# Patient Record
Sex: MALE | Race: WHITE | NOT HISPANIC OR LATINO | Employment: FULL TIME | URBAN - METROPOLITAN AREA
[De-identification: names, ages, dates, MRNs, and addresses within clinical notes are randomized per-mention and may not be internally consistent; named-entity substitution may affect disease eponyms.]

---

## 2017-11-01 ENCOUNTER — HOSPITAL ENCOUNTER (EMERGENCY)
Facility: HOSPITAL | Age: 27
Discharge: LEFT BEFORE DISCHARGE | End: 2017-11-01

## 2017-11-01 PROCEDURE — 99211 OFF/OP EST MAY X REQ PHY/QHP: CPT

## 2018-08-15 ENCOUNTER — OFFICE VISIT (OUTPATIENT)
Dept: INTERNAL MEDICINE | Facility: CLINIC | Age: 28
End: 2018-08-15

## 2018-08-15 VITALS
SYSTOLIC BLOOD PRESSURE: 122 MMHG | HEART RATE: 74 BPM | RESPIRATION RATE: 16 BRPM | DIASTOLIC BLOOD PRESSURE: 84 MMHG | BODY MASS INDEX: 23.84 KG/M2 | HEIGHT: 72 IN | TEMPERATURE: 97 F | WEIGHT: 176 LBS

## 2018-08-15 DIAGNOSIS — F41.0 PANIC ATTACKS: ICD-10-CM

## 2018-08-15 DIAGNOSIS — Z76.89 ENCOUNTER TO ESTABLISH CARE: Primary | ICD-10-CM

## 2018-08-15 DIAGNOSIS — F41.9 ANXIETY: ICD-10-CM

## 2018-08-15 DIAGNOSIS — Z13.220 LIPID SCREENING: ICD-10-CM

## 2018-08-15 LAB
ALBUMIN SERPL-MCNC: 4.41 G/DL (ref 3.2–4.8)
ALBUMIN/GLOB SERPL: 1.9 G/DL (ref 1.5–2.5)
ALP SERPL-CCNC: 59 U/L (ref 25–100)
ALT SERPL W P-5'-P-CCNC: 20 U/L (ref 7–40)
ANION GAP SERPL CALCULATED.3IONS-SCNC: 5 MMOL/L (ref 3–11)
ARTICHOKE IGE QN: 83 MG/DL (ref 0–130)
AST SERPL-CCNC: 19 U/L (ref 0–33)
BILIRUB SERPL-MCNC: 0.4 MG/DL (ref 0.3–1.2)
BUN BLD-MCNC: 11 MG/DL (ref 9–23)
BUN/CREAT SERPL: 12.1 (ref 7–25)
CALCIUM SPEC-SCNC: 9.1 MG/DL (ref 8.7–10.4)
CHLORIDE SERPL-SCNC: 104 MMOL/L (ref 99–109)
CHOLEST SERPL-MCNC: 159 MG/DL (ref 0–200)
CO2 SERPL-SCNC: 30 MMOL/L (ref 20–31)
CREAT BLD-MCNC: 0.91 MG/DL (ref 0.6–1.3)
DEPRECATED RDW RBC AUTO: 44.3 FL (ref 37–54)
ERYTHROCYTE [DISTWIDTH] IN BLOOD BY AUTOMATED COUNT: 13.5 % (ref 11.3–14.5)
GFR SERPL CREATININE-BSD FRML MDRD: 101 ML/MIN/1.73
GLOBULIN UR ELPH-MCNC: 2.3 GM/DL
GLUCOSE BLD-MCNC: 92 MG/DL (ref 70–100)
HCT VFR BLD AUTO: 42.4 % (ref 38.9–50.9)
HDLC SERPL-MCNC: 60 MG/DL (ref 40–60)
HGB BLD-MCNC: 14 G/DL (ref 13.1–17.5)
MCH RBC QN AUTO: 29.9 PG (ref 27–31)
MCHC RBC AUTO-ENTMCNC: 33 G/DL (ref 32–36)
MCV RBC AUTO: 90.6 FL (ref 80–99)
PLATELET # BLD AUTO: 239 10*3/MM3 (ref 150–450)
PMV BLD AUTO: 10.6 FL (ref 6–12)
POTASSIUM BLD-SCNC: 4.7 MMOL/L (ref 3.5–5.5)
PROT SERPL-MCNC: 6.7 G/DL (ref 5.7–8.2)
RBC # BLD AUTO: 4.68 10*6/MM3 (ref 4.2–5.76)
SODIUM BLD-SCNC: 139 MMOL/L (ref 132–146)
T4 FREE SERPL-MCNC: 1.23 NG/DL (ref 0.89–1.76)
TRIGL SERPL-MCNC: 105 MG/DL (ref 0–150)
TSH SERPL DL<=0.05 MIU/L-ACNC: 0.45 MIU/ML (ref 0.35–5.35)
WBC NRBC COR # BLD: 7.24 10*3/MM3 (ref 3.5–10.8)

## 2018-08-15 PROCEDURE — 36415 COLL VENOUS BLD VENIPUNCTURE: CPT | Performed by: INTERNAL MEDICINE

## 2018-08-15 PROCEDURE — 84443 ASSAY THYROID STIM HORMONE: CPT | Performed by: INTERNAL MEDICINE

## 2018-08-15 PROCEDURE — 99204 OFFICE O/P NEW MOD 45 MIN: CPT | Performed by: INTERNAL MEDICINE

## 2018-08-15 PROCEDURE — 85027 COMPLETE CBC AUTOMATED: CPT | Performed by: INTERNAL MEDICINE

## 2018-08-15 PROCEDURE — 80061 LIPID PANEL: CPT | Performed by: INTERNAL MEDICINE

## 2018-08-15 PROCEDURE — 84439 ASSAY OF FREE THYROXINE: CPT | Performed by: INTERNAL MEDICINE

## 2018-08-15 PROCEDURE — 80053 COMPREHEN METABOLIC PANEL: CPT | Performed by: INTERNAL MEDICINE

## 2018-08-15 RX ORDER — CETIRIZINE HYDROCHLORIDE 10 MG/1
10 TABLET ORAL AS NEEDED
COMMUNITY

## 2018-08-15 RX ORDER — DIPHENHYDRAMINE HCL 25 MG
25 CAPSULE ORAL NIGHTLY PRN
COMMUNITY
End: 2019-06-10

## 2018-08-15 RX ORDER — HYDROXYZINE 50 MG/1
50 TABLET, FILM COATED ORAL 3 TIMES DAILY PRN
Qty: 50 TABLET | Refills: 2 | Status: SHIPPED | OUTPATIENT
Start: 2018-08-15 | End: 2018-11-29 | Stop reason: SDUPTHER

## 2018-08-15 NOTE — PROGRESS NOTES
Subjective   Tr Brito is a 28 y.o. male.     History of Present Illness     New patient - establish visit    1 anxiety/panic  Duration 3-4 years  Patient says that he has been having increase episodes of panic attacks .  Patient says that he has at least 3-4 panic attacks a month and does not have any particular etiology or cause.  Patient denies any worsening depression, Hallucinations, or any other systemic symptoms.      Past medical History   Colitis- 2015    Family History   Anxiety  Depression     Socially History no EtOH consumption, no IV drug use, no marijuana,    Review of Systems   All other systems reviewed and are negative.      Objective   Physical Exam   Constitutional: He is oriented to person, place, and time. He appears well-developed and well-nourished.   HENT:   Head: Normocephalic.   Right Ear: External ear normal.   Left Ear: External ear normal.   Nose: Nose normal.   Mouth/Throat: Oropharynx is clear and moist.   Eyes: Pupils are equal, round, and reactive to light. Conjunctivae and EOM are normal.   Neck: Normal range of motion. Neck supple.   Cardiovascular: Normal rate, regular rhythm and normal heart sounds.    Pulmonary/Chest: Effort normal and breath sounds normal.   Musculoskeletal: Normal range of motion.   Neurological: He is alert and oriented to person, place, and time.   Nursing note and vitals reviewed.        Assessment/Plan   Tr was seen today for establish care and anxiety.    Diagnoses and all orders for this visit:    Encounter to establish care  -     CBC (No Diff)  -     Comprehensive Metabolic Panel  -     T4, Free  -     TSH    Anxiety  -     hydrOXYzine (ATARAX) 50 MG tablet; Take 1 tablet by mouth 3 (Three) Times a Day As Needed for Itching.    Panic attacks    Lipid screening  -     Lipid Panel

## 2018-09-13 ENCOUNTER — TELEPHONE (OUTPATIENT)
Dept: INTERNAL MEDICINE | Facility: CLINIC | Age: 28
End: 2018-09-13

## 2018-09-13 NOTE — TELEPHONE ENCOUNTER
In regards to addressing his current back pain situation he would have to be seen and evaluated before any recommendations and treatment.    Insurance will likely want him to do physical therapy first before considering covering for an MRI just FYI    Patient really should be scheduled for a follow-up appointment for his current back situation

## 2018-09-13 NOTE — TELEPHONE ENCOUNTER
----- Message from Jenny Sarah sent at 9/13/2018  1:42 PM EDT -----  MOTHER CRESENCIO LAOVEDTFA-[phone]    PT HAD AN MRI ABOUT 7 YEARS AGO THAT SHOWED A HERNIATED DISC.  HE IS IN EXTREME PAIN AND HAS HAD NUMBNESS AND PAIN IN HIS FEET.  HE THINKS HE HAD THE MRI AT [facility]-CAN YOU FIND THAT AND THEN WHAT IS THE NEXT STEP?  THEY ARE WONDERING IF HE NEEDS ANOTHER MRI TO COMPARE.

## 2018-09-20 ENCOUNTER — OFFICE VISIT (OUTPATIENT)
Dept: INTERNAL MEDICINE | Facility: CLINIC | Age: 28
End: 2018-09-20

## 2018-09-20 VITALS
RESPIRATION RATE: 18 BRPM | HEART RATE: 68 BPM | DIASTOLIC BLOOD PRESSURE: 86 MMHG | SYSTOLIC BLOOD PRESSURE: 128 MMHG | WEIGHT: 183 LBS | BODY MASS INDEX: 24.65 KG/M2 | TEMPERATURE: 97.4 F

## 2018-09-20 DIAGNOSIS — M54.41 CHRONIC BILATERAL LOW BACK PAIN WITH RIGHT-SIDED SCIATICA: ICD-10-CM

## 2018-09-20 DIAGNOSIS — G89.29 CHRONIC BILATERAL LOW BACK PAIN WITHOUT SCIATICA: ICD-10-CM

## 2018-09-20 DIAGNOSIS — F41.9 ANXIETY: Primary | ICD-10-CM

## 2018-09-20 DIAGNOSIS — M54.50 CHRONIC BILATERAL LOW BACK PAIN WITHOUT SCIATICA: ICD-10-CM

## 2018-09-20 DIAGNOSIS — G89.29 CHRONIC BILATERAL LOW BACK PAIN WITH RIGHT-SIDED SCIATICA: ICD-10-CM

## 2018-09-20 PROCEDURE — 99214 OFFICE O/P EST MOD 30 MIN: CPT | Performed by: INTERNAL MEDICINE

## 2018-09-20 RX ORDER — MELOXICAM 15 MG/1
15 TABLET ORAL DAILY
Qty: 30 TABLET | Refills: 3 | Status: SHIPPED | OUTPATIENT
Start: 2018-09-20 | End: 2019-06-10

## 2018-09-20 NOTE — PROGRESS NOTES
Subjective   Tr Brito is a 28 y.o. male.     History of Present Illness     1 Back Pain -chronic.  Patient has had chronic back pain for several years.  Pain is localized to lower lumbar region and made worse with flexion, extension, rotational movement.  He also has pain in lower lumbar region with prolong standing.  Patient has been using      2 anxiety- chronic and controlled.  Patient says that his emotions have been doing fairly well.  Patient has not had any recent exacerbations of her panic attacks.    Past medical History   Herniated disc on L5 -dx about 5 years ago     Review of Systems   All other systems reviewed and are negative.      Objective   Physical Exam   Constitutional: He is oriented to person, place, and time. He appears well-developed and well-nourished.   HENT:   Head: Normocephalic.   Right Ear: External ear normal.   Left Ear: External ear normal.   Nose: Nose normal.   Mouth/Throat: Oropharynx is clear and moist.   Eyes: Pupils are equal, round, and reactive to light. Conjunctivae and EOM are normal.   Neck: Normal range of motion. Neck supple.   Cardiovascular: Normal rate, regular rhythm and normal heart sounds.    Pulmonary/Chest: Effort normal and breath sounds normal.   Abdominal: Soft. Bowel sounds are normal.   Musculoskeletal: Normal range of motion.   Neurological: He is alert and oriented to person, place, and time.   Nursing note and vitals reviewed.        Assessment/Plan   Tr was seen today for back pain.    Diagnoses and all orders for this visit:    Anxiety continue on current medications.    Chronic bilateral low back pain without sciatica  -     meloxicam (MOBIC) 15 MG tablet; Take 1 tablet by mouth Daily.    Chronic bilateral low back pain with right-sided sciatica  -     MRI Lumbar Spine Without Contrast; Future

## 2018-09-26 ENCOUNTER — TELEPHONE (OUTPATIENT)
Dept: INTERNAL MEDICINE | Facility: CLINIC | Age: 28
End: 2018-09-26

## 2018-11-14 ENCOUNTER — OFFICE VISIT (OUTPATIENT)
Dept: INTERNAL MEDICINE | Facility: CLINIC | Age: 28
End: 2018-11-14

## 2018-11-14 VITALS
SYSTOLIC BLOOD PRESSURE: 122 MMHG | BODY MASS INDEX: 25.99 KG/M2 | OXYGEN SATURATION: 99 % | HEART RATE: 63 BPM | RESPIRATION RATE: 16 BRPM | DIASTOLIC BLOOD PRESSURE: 78 MMHG | WEIGHT: 193 LBS

## 2018-11-14 DIAGNOSIS — K62.5 BRBPR (BRIGHT RED BLOOD PER RECTUM): Primary | ICD-10-CM

## 2018-11-14 PROCEDURE — 99214 OFFICE O/P EST MOD 30 MIN: CPT | Performed by: INTERNAL MEDICINE

## 2018-11-14 NOTE — PROGRESS NOTES
"Subjective   Tr Brito is a 28 y.o. male.     History of Present Illness     Blood in the stool -bright red blood (new issue)  Duration several months  Sx Patient says that he has noticed some bright red blood patient describes the sensation of itching, anal irritation, \"it feels like there is a cut right there\" he denies any fever, chills, nausea, vomiting, diarrhea, anorexia, weight loss, or any other systemic signs.      Family History   No inflammatory bowel disease       Past medical history   Colitis-secondary to salmonella   Previous colonoscopy before-but patient says that he does not feel like these are the similar symptoms of when he had the infectious colitis      Review of Systems   All other systems reviewed and are negative.      Objective   Physical Exam   Constitutional: He appears well-developed and well-nourished.   HENT:   Head: Normocephalic and atraumatic.   Nose: Nose normal.   Mouth/Throat: Oropharynx is clear and moist.   Eyes: EOM are normal. Pupils are equal, round, and reactive to light.   Neck: Normal range of motion. Neck supple.   Cardiovascular: Normal rate, regular rhythm and normal heart sounds.   Pulmonary/Chest: Effort normal and breath sounds normal.   Abdominal: Soft. Bowel sounds are normal. He exhibits no distension and no mass. There is no tenderness. There is no rebound and no guarding. No hernia.   Genitourinary: Rectal exam shows guaiac positive stool.   Neurological: He is alert.   Psychiatric: He has a normal mood and affect. His behavior is normal.   Nursing note and vitals reviewed.        Assessment/Plan   Tr was seen today for rectal bleeding.    Diagnoses and all orders for this visit:    BRBPR (bright red blood per rectum)    After review of history, physical, symptoms are more suggestive of a possible internal hemorrhoid or anal fissure.    Recommend sitz bath.  Recommend Preparation H steroid suppository  If symptoms do not improve in 2 weeks " patient is to call back to the clinic and to inform me and we will proceed with further workup and recommendations.-Most likely referral to GI and/or endoscopy

## 2018-11-29 ENCOUNTER — OFFICE VISIT (OUTPATIENT)
Dept: INTERNAL MEDICINE | Facility: CLINIC | Age: 28
End: 2018-11-29

## 2018-11-29 VITALS
OXYGEN SATURATION: 98 % | SYSTOLIC BLOOD PRESSURE: 118 MMHG | BODY MASS INDEX: 25.73 KG/M2 | RESPIRATION RATE: 16 BRPM | HEART RATE: 68 BPM | DIASTOLIC BLOOD PRESSURE: 78 MMHG | WEIGHT: 191 LBS

## 2018-11-29 DIAGNOSIS — F41.9 ANXIETY: Primary | ICD-10-CM

## 2018-11-29 PROCEDURE — 99213 OFFICE O/P EST LOW 20 MIN: CPT | Performed by: INTERNAL MEDICINE

## 2018-11-29 RX ORDER — HYDROXYZINE 50 MG/1
50 TABLET, FILM COATED ORAL 3 TIMES DAILY PRN
Qty: 50 TABLET | Refills: 2 | Status: SHIPPED | OUTPATIENT
Start: 2018-11-29 | End: 2018-11-29 | Stop reason: SDUPTHER

## 2018-11-29 RX ORDER — BUPROPION HYDROCHLORIDE 150 MG/1
150 TABLET ORAL DAILY
Qty: 30 TABLET | Refills: 3 | Status: SHIPPED | OUTPATIENT
Start: 2018-11-29 | End: 2019-01-11

## 2018-11-29 RX ORDER — BUPROPION HYDROCHLORIDE 150 MG/1
150 TABLET ORAL DAILY
Qty: 30 TABLET | Refills: 3 | Status: SHIPPED | OUTPATIENT
Start: 2018-11-29 | End: 2018-11-29 | Stop reason: SDUPTHER

## 2018-11-29 RX ORDER — HYDROXYZINE 50 MG/1
50 TABLET, FILM COATED ORAL 3 TIMES DAILY PRN
Qty: 50 TABLET | Refills: 2 | Status: SHIPPED | OUTPATIENT
Start: 2018-11-29 | End: 2019-02-28 | Stop reason: SDUPTHER

## 2018-11-29 NOTE — PROGRESS NOTES
Subjective   Tr Brito is a 28 y.o. male.     History of Present Illness     1 hemorrhoids-currently resolved, patient says that he has been using the rectal suppository and this has helped tremendously with his symptoms.    2 anxiety-patient also says that the Wellbutrin has helped tremendously with controlling his anxiety, emotions, and no concerns of depression at this time.  Patient denies any suicide ideations, emotional liability of threats towards himself or anybody else    Past Medical history  Anxiety/depression - 2014    Review of Systems   All other systems reviewed and are negative.      Objective   Physical Exam   Constitutional: He is oriented to person, place, and time. He appears well-developed and well-nourished.   HENT:   Head: Normocephalic.   Right Ear: External ear normal.   Left Ear: External ear normal.   Nose: Nose normal.   Mouth/Throat: Oropharynx is clear and moist.   Eyes: Conjunctivae and EOM are normal. Pupils are equal, round, and reactive to light.   Neck: Normal range of motion. Neck supple.   Cardiovascular: Normal rate, regular rhythm and normal heart sounds.   Pulmonary/Chest: Effort normal and breath sounds normal.   Musculoskeletal: Normal range of motion.   Neurological: He is alert and oriented to person, place, and time.   Nursing note and vitals reviewed.        Assessment/Plan   Tr was seen today for follow-up.    Diagnoses and all orders for this visit:    Anxiety  -     Discontinue: buPROPion XL (WELLBUTRIN XL) 150 MG 24 hr tablet; Take 1 tablet by mouth Daily.    -     Discontinue: hydrOXYzine (ATARAX) 50 MG tablet; Take 1 tablet by mouth 3 (Three) Times a Day As   Needed for Itching.    -     hydrOXYzine (ATARAX) 50 MG tablet; Take 1 tablet by mouth 3 (Three) Times a Day As Needed for Itching.    -     buPROPion XL (WELLBUTRIN XL) 150 MG 24 hr tablet; Take 1 tablet by mouth Daily.

## 2019-01-11 ENCOUNTER — OFFICE VISIT (OUTPATIENT)
Dept: INTERNAL MEDICINE | Facility: CLINIC | Age: 29
End: 2019-01-11

## 2019-01-11 VITALS
SYSTOLIC BLOOD PRESSURE: 120 MMHG | RESPIRATION RATE: 18 BRPM | DIASTOLIC BLOOD PRESSURE: 76 MMHG | OXYGEN SATURATION: 99 % | BODY MASS INDEX: 26.61 KG/M2 | WEIGHT: 197.6 LBS | HEART RATE: 66 BPM

## 2019-01-11 DIAGNOSIS — F41.0 PANIC ATTACKS: ICD-10-CM

## 2019-01-11 DIAGNOSIS — F41.9 ANXIETY: Primary | ICD-10-CM

## 2019-01-11 PROCEDURE — 99214 OFFICE O/P EST MOD 30 MIN: CPT | Performed by: INTERNAL MEDICINE

## 2019-01-11 RX ORDER — BUPROPION HYDROCHLORIDE 300 MG/1
300 TABLET ORAL DAILY
Qty: 30 TABLET | Refills: 3 | Status: SHIPPED | OUTPATIENT
Start: 2019-01-11 | End: 2020-02-07

## 2019-01-11 NOTE — PROGRESS NOTES
Subjective   Tr Brito is a 28 y.o. male.     History of Present Illness     1anxiety/panic attack-patient is here for follow-up with concerns of his anxiety and panic attacks.  Patient says that he does not feel any better or worse since starting the medication that he has had some intermittent episodes of panic attacks.  Patient denies any worsening depression, suicide ideations, hallucinations, or any other systemic disease.  Patient is open to considerations of increasing the medication to see how this helps with his anxiety.     Review of Systems   All other systems reviewed and are negative.      Objective   Physical Exam   Constitutional: He appears well-developed and well-nourished.   HENT:   Head: Normocephalic.   Right Ear: External ear normal.   Left Ear: External ear normal.   Nose: Nose normal.   Mouth/Throat: Oropharynx is clear and moist.   Eyes: Conjunctivae and EOM are normal. Pupils are equal, round, and reactive to light.   Neck: Normal range of motion. Neck supple.   Cardiovascular: Normal rate, regular rhythm and normal heart sounds.   Pulmonary/Chest: Effort normal and breath sounds normal.   Abdominal: Soft. Bowel sounds are normal.   Nursing note and vitals reviewed.        Assessment/Plan   Tr was seen today for anxiety and panic attack.    Diagnoses and all orders for this visit:    Anxiety  -     Ambulatory Referral to Psychiatry  (medication increase dosage change)  -     buPROPion XL (WELLBUTRIN XL) 300 MG 24 hr tablet; Take 1 tablet by mouth Daily.    Panic attacks  -     Ambulatory Referral to Psychiatry

## 2019-02-28 ENCOUNTER — OFFICE VISIT (OUTPATIENT)
Dept: INTERNAL MEDICINE | Facility: CLINIC | Age: 29
End: 2019-02-28

## 2019-02-28 VITALS
HEART RATE: 78 BPM | SYSTOLIC BLOOD PRESSURE: 110 MMHG | TEMPERATURE: 98 F | WEIGHT: 203.25 LBS | RESPIRATION RATE: 20 BRPM | BODY MASS INDEX: 27.38 KG/M2 | DIASTOLIC BLOOD PRESSURE: 80 MMHG

## 2019-02-28 DIAGNOSIS — F41.9 ANXIETY: Primary | ICD-10-CM

## 2019-02-28 DIAGNOSIS — M54.50 CHRONIC BILATERAL LOW BACK PAIN WITHOUT SCIATICA: ICD-10-CM

## 2019-02-28 DIAGNOSIS — G89.29 CHRONIC BILATERAL LOW BACK PAIN WITHOUT SCIATICA: ICD-10-CM

## 2019-02-28 DIAGNOSIS — F32.A DEPRESSION, UNSPECIFIED DEPRESSION TYPE: ICD-10-CM

## 2019-02-28 PROCEDURE — 99214 OFFICE O/P EST MOD 30 MIN: CPT | Performed by: INTERNAL MEDICINE

## 2019-02-28 RX ORDER — HYDROXYZINE 50 MG/1
50 TABLET, FILM COATED ORAL 3 TIMES DAILY PRN
Qty: 50 TABLET | Refills: 2 | Status: SHIPPED | OUTPATIENT
Start: 2019-02-28 | End: 2021-01-25 | Stop reason: SDUPTHER

## 2019-02-28 RX ORDER — TRAMADOL HYDROCHLORIDE 50 MG/1
50 TABLET ORAL EVERY 6 HOURS PRN
Qty: 50 TABLET | Refills: 2 | Status: SHIPPED | OUTPATIENT
Start: 2019-02-28 | End: 2019-03-25 | Stop reason: SDUPTHER

## 2019-03-06 ENCOUNTER — TELEPHONE (OUTPATIENT)
Dept: INTERNAL MEDICINE | Facility: CLINIC | Age: 29
End: 2019-03-06

## 2019-03-06 NOTE — TELEPHONE ENCOUNTER
Unfortunately that is the strongest that I can give.  Anything stronger will have to be referred to a chronic pain management i.e. for chronic pain

## 2019-03-06 NOTE — TELEPHONE ENCOUNTER
----- Message from Davida Clement sent at 3/5/2019  2:59 PM EST -----  PATIENT'S MOM CRESENCIO CALLED AND STATES THE ULTRAM ISN'T HELPING WITH THE BACK PAIN CAN SOMETHING STRONGER [facility] [address] SHE STATES SHE WOULD LIKE AN UPDATE ON THE MRI PA. SHE CAN BE REACHED [phone]

## 2019-03-11 ENCOUNTER — TELEPHONE (OUTPATIENT)
Dept: INTERNAL MEDICINE | Facility: CLINIC | Age: 29
End: 2019-03-11

## 2019-03-11 NOTE — TELEPHONE ENCOUNTER
Bryant Pt  LOV:02/28/19  Next appt: 04/10/19  Last refill:02/28/19    Pt should've had 2 refills on Rx that was printed. I called pharmacy and she stated it was scribbled out on the bottom of the Rx. I indicated just to go ahead and give pt new Rx with 1 additional at this time. She verbalized good understanding, thanked our office and we ended the call.

## 2019-03-11 NOTE — TELEPHONE ENCOUNTER
----- Message from Mildred Guillen sent at 3/11/2019 11:53 AM EDT -----  PATIENT CALLED AND REQUESTED A REFILL ON traMADol (ULTRAM) 50 MG tablet. PATIENTS PREFERRED PHARMACY IS [facility] [place]. PATIENT CAN BE REACHED [phone].

## 2019-03-19 ENCOUNTER — HOSPITAL ENCOUNTER (OUTPATIENT)
Dept: MRI IMAGING | Facility: HOSPITAL | Age: 29
Discharge: HOME | End: 2019-03-19
Admitting: INTERNAL MEDICINE

## 2019-03-19 DIAGNOSIS — G89.29 CHRONIC BILATERAL LOW BACK PAIN WITHOUT SCIATICA: ICD-10-CM

## 2019-03-19 DIAGNOSIS — M54.50 CHRONIC BILATERAL LOW BACK PAIN WITHOUT SCIATICA: ICD-10-CM

## 2019-03-19 PROCEDURE — 72148 MRI LUMBAR SPINE W/O DYE: CPT

## 2019-03-22 ENCOUNTER — TELEPHONE (OUTPATIENT)
Dept: INTERNAL MEDICINE | Facility: CLINIC | Age: 29
End: 2019-03-22

## 2019-03-22 DIAGNOSIS — M54.50 CHRONIC BILATERAL LOW BACK PAIN WITHOUT SCIATICA: ICD-10-CM

## 2019-03-22 DIAGNOSIS — G89.29 CHRONIC BILATERAL LOW BACK PAIN WITHOUT SCIATICA: ICD-10-CM

## 2019-03-22 NOTE — TELEPHONE ENCOUNTER
Spoke to patient he stated that he would like to see about maybe back injections first before surgery or the pain clinic. He also stated that he has an old MRI in chart and wanted to know if you could compare them to see if his changes have gotten worse or stayed the same.

## 2019-03-22 NOTE — TELEPHONE ENCOUNTER
The only other option would be to increase the dosage to 2 tablets of the tramadol.    Let me know what he would like to do

## 2019-03-22 NOTE — TELEPHONE ENCOUNTER
Patient called back and states he is currently taking Tramadol and not getting any relief at the current dosage. Understands he will not be prescribed any different pain med but wants to know if he can possibly be given a higher dose. Uses [facility] in [place].

## 2019-03-25 RX ORDER — TRAMADOL HYDROCHLORIDE 50 MG/1
TABLET ORAL
Qty: 120 TABLET | Refills: 1 | Status: SHIPPED | OUTPATIENT
Start: 2019-03-25 | End: 2019-06-10 | Stop reason: SDUPTHER

## 2019-03-25 NOTE — TELEPHONE ENCOUNTER
Please tell patient that that is correct a disc protrusion is the same as disc herniation or bulge.  This implies that the vertebral discs has slipped out of place and is now impinging or compressing a nerve in the lower back region.  This compression with therefore be the origin of his pain.    I have called in the tramadol 50 mg    He is to take 2 tablets by mouth every 6 hours as needed

## 2019-03-25 NOTE — TELEPHONE ENCOUNTER
Spoke to patient he is agreeable to the increase of the tramadol. He also wanted to know what it means when you say disc protrusion. He stated that he has tried to look it up on the Internet and some sites tell him its herniated and some say bulge. Please advise.

## 2019-03-28 ENCOUNTER — TELEPHONE (OUTPATIENT)
Dept: INTERNAL MEDICINE | Facility: CLINIC | Age: 29
End: 2019-03-28

## 2019-03-28 DIAGNOSIS — M54.5 CHRONIC LOW BACK PAIN, UNSPECIFIED BACK PAIN LATERALITY, WITH SCIATICA PRESENCE UNSPECIFIED: Primary | ICD-10-CM

## 2019-03-28 DIAGNOSIS — G89.29 CHRONIC LOW BACK PAIN, UNSPECIFIED BACK PAIN LATERALITY, WITH SCIATICA PRESENCE UNSPECIFIED: Primary | ICD-10-CM

## 2019-03-28 NOTE — TELEPHONE ENCOUNTER
----- Message from Lisa Corrales sent at 3/28/2019  3:47 PM EDT -----  Patient called states there was a misunderstanding and he did in fact want a referral to pain management. He does not want surgical options at this time but he does want to receive back injections at a pain clinic. Please put in referral to pain management. Pain management consent form was mailed to home address of [address] [place], [place] [id number].

## 2019-03-29 ENCOUNTER — PRIOR AUTHORIZATION (OUTPATIENT)
Dept: INTERNAL MEDICINE | Facility: CLINIC | Age: 29
End: 2019-03-29

## 2019-03-29 NOTE — TELEPHONE ENCOUNTER
PA was sent to covermymeds and was denied, Patient paid out of pocket for Rx. He stated that he will call the insurance company and see what is the preferred drug.

## 2019-03-29 NOTE — TELEPHONE ENCOUNTER
Patient called back and stated that he called his insurance and asked what drug is covered under their plan and the pharmacist told him that they could cover Hydrocodone for 7 days anything past the 7 days would need a PA.

## 2019-03-30 NOTE — TELEPHONE ENCOUNTER
I am not going to let insurance dictate the proper treatment for pain management for this patient.  The insurance companies no absolutely nothing about the history of this patient and therefore hydrocodone would not be appropriate for him.    The strongest that I will provide his tramadol    If you do not mind Dee if you could please provide me with the contact number for the insurance company, the insurance company pharmacy and prior authorization department.  Just leave the contact information on my desk and I will contact them myself this type of request from the insurance company is inappropriate and I would like to talk to them myself when I get back.    Please let patient know that tramadol is the strongest that I can provide–no narcotics

## 2019-05-08 ENCOUNTER — TELEPHONE (OUTPATIENT)
Dept: PAIN MEDICINE | Facility: CLINIC | Age: 29
End: 2019-05-08

## 2019-05-22 ENCOUNTER — TELEPHONE (OUTPATIENT)
Dept: INTERNAL MEDICINE | Facility: CLINIC | Age: 29
End: 2019-05-22

## 2019-05-22 NOTE — TELEPHONE ENCOUNTER
Hydroxyzine can be increased to 75-100mg TID PRN. (Would not do longterm as can cause dizziness, dry mouth etc) but short term increase for flight anxiety may help.    Unfortunately anything further would need appt and/or d/w PCP.

## 2019-05-22 NOTE — TELEPHONE ENCOUNTER
Patient called in today and stated that he is leaving out of town on a flight tonight at 8 and started to feel the panic attacks last night but thought he would be ok, but today woke up with extreme anxiety about the travel. I explained that Dr. Hurtado is out of the office today but I could send a message to the on call doctor. Patient agreed and was just wanting to know if there is anything he could do for his symptoms before he leaves tonight. Patient is currently on Hydroxyzine 50mg TID as needed and Wellbutrin 300 mg daily.

## 2019-06-10 ENCOUNTER — OFFICE VISIT (OUTPATIENT)
Dept: INTERNAL MEDICINE | Facility: CLINIC | Age: 29
End: 2019-06-10

## 2019-06-10 VITALS
BODY MASS INDEX: 26.43 KG/M2 | HEART RATE: 80 BPM | DIASTOLIC BLOOD PRESSURE: 80 MMHG | WEIGHT: 196.25 LBS | RESPIRATION RATE: 20 BRPM | TEMPERATURE: 98.1 F | SYSTOLIC BLOOD PRESSURE: 120 MMHG

## 2019-06-10 DIAGNOSIS — F43.21 GRIEF: Primary | ICD-10-CM

## 2019-06-10 DIAGNOSIS — F41.9 ANXIETY: ICD-10-CM

## 2019-06-10 DIAGNOSIS — G89.29 CHRONIC BILATERAL LOW BACK PAIN WITHOUT SCIATICA: ICD-10-CM

## 2019-06-10 DIAGNOSIS — F41.0 PANIC ATTACKS: ICD-10-CM

## 2019-06-10 DIAGNOSIS — M54.50 CHRONIC BILATERAL LOW BACK PAIN WITHOUT SCIATICA: ICD-10-CM

## 2019-06-10 PROCEDURE — 99214 OFFICE O/P EST MOD 30 MIN: CPT | Performed by: INTERNAL MEDICINE

## 2019-06-10 RX ORDER — TRAMADOL HYDROCHLORIDE 50 MG/1
TABLET ORAL
Qty: 120 TABLET | Refills: 1 | Status: SHIPPED | OUTPATIENT
Start: 2019-06-10 | End: 2022-09-20

## 2019-06-17 NOTE — PROGRESS NOTES
"Subjective   Tr Brito is a 29 y.o. male.     History of Present Illness     The following portions of the patient's history were reviewed and updated as appropriate: allergies, current medications, past family history, past medical history, past social history, past surgical history and problem list.    Anxiety-chronic and worsening with minimal exacerbations.  Patient is still dealing with the death of his mother and it has been \"difficult\" but he has been managing with support from family and friends.  He denies any suicidal ideations, emotional liability or threats towards himself or anybody else, no hallucinations.  He is continued on his current medication and has not had any side effects from these meds.    2 low back pain-chronic with mild exacerbation.  Patient continues to have intermittent exacerbations of low back pain localized to lower lumbar region pain is made worse with prolonged standing, rotational movement around the lumbar region bending and twisting.  Patient denies any lower extremity weakness, numbness, tingling, or any other systemic symptoms.    Review of Systems   All other systems reviewed and are negative.      Objective   Physical Exam   Constitutional: He appears well-developed and well-nourished.   HENT:   Head: Normocephalic.   Right Ear: External ear normal.   Left Ear: External ear normal.   Nose: Nose normal.   Mouth/Throat: Oropharynx is clear and moist.   Eyes: Conjunctivae and EOM are normal. Pupils are equal, round, and reactive to light.   Neck: Normal range of motion. Neck supple.   Cardiovascular: Normal rate, regular rhythm and normal heart sounds.   Pulmonary/Chest: Effort normal and breath sounds normal.   Abdominal: Soft. Bowel sounds are normal.   Skin: Skin is warm.   Psychiatric: He has a normal mood and affect. His behavior is normal.   Nursing note and vitals reviewed.        Assessment/Plan   Tr was seen today for anxiety.    Diagnoses and all " orders for this visit:  Approximately 30 minutes face-to-face with patient, 50% of that time used to discuss chronic medical issues listed    Grief  -     Ambulatory Referral to Psychology    Anxiety-stable continue with current medications and counseling as previously mentioned    Panic attacks    Chronic bilateral low back pain without sciatica  -     traMADol (ULTRAM) 50 MG tablet; Take two tablets po Q6h prn

## 2019-06-24 ENCOUNTER — OFFICE VISIT (OUTPATIENT)
Dept: PSYCHIATRY | Facility: CLINIC | Age: 29
End: 2019-06-24

## 2019-06-24 DIAGNOSIS — F43.21 GRIEF: Primary | ICD-10-CM

## 2019-06-24 DIAGNOSIS — R45.4 IRRITABILITY AND ANGER: ICD-10-CM

## 2019-06-24 DIAGNOSIS — F41.8 ANXIETY ABOUT HEALTH: ICD-10-CM

## 2019-06-24 PROCEDURE — 90791 PSYCH DIAGNOSTIC EVALUATION: CPT | Performed by: PSYCHOLOGIST

## 2019-06-24 NOTE — PROGRESS NOTES
"PROGRESS NOTE    Data:  Tr Brito is a 29 y.o. male who met with the undersigned for a scheduled individual outpatient therapy session from 9:30 - 10:15am.      Clinical Maneuvering/Intervention:      Pt talked about struggling with losing his mother to cancer this past April. He was pleasant in session and forthcoming with personal information.A psychological evaluation was conducted in order to assess past and current level of functioning. Areas assessed included, but were not limited to: perception of social support, perception of ability to face and deal with challenges in life (positive functioning), anxiety symptoms, depressive symptoms, perspective on beliefs/belief system, coping skills for stress, intelligence level, addiction issues, etc. Therapeutic rapport was established. Interventions conducted today were geared towards grief therapy, improving coping skills, and identifying potential behavioral changes that could help him feel less irritable. He expressed concern that he is to abrupt with people, like his girlfriend, and does not want to be this way. After processing more about the loss of his mother, he was assisted in learning better ways to relate to people. Education about acting assertively instead of aggressively was provided. Examples were given and he was assisted with practicing such statements. Also, he will start practicing (with his girlfriend or other close person), when starting to feel irked/annoyed, he will pause, ask them \"What did you mean by that?\" and then listen. He will practice investigating the meaning behind what is said before assuming that it is a personal attack. Education was also provided as to the nature of counseling and what to expect in subsequent sessions. A treatment plan was initiated tailored to meeting pt’s presenting needs. The pt was encouraged to return for additional sessions and agreed to do so. He also talked about having anxiety about health at " times. This will be discussed in subsequent sessions in greater detail, but some education about coping with anxiety by building confidence (assertiveness) was presented.          Mental Status Exam  Hygiene:  good  Dress: normal  Attitude:  cooperative and proactive  Motor Activity: normal  Speech: normal  Mood:  anxious  Affect:  congruent  Thought Processes: normal  Thought Content:  normal  Suicidal Thoughts:  not endorsed  Homicidal Thoughts:  not endorsed  Crisis Safety Plan: not needed   Hallucinations:  none      Patient's Support Network Includes:  family, friends      Progress toward goal: there is evidence to suggest that he is taking measures to improve the quality of his life including seeking counseling and being open to the process.      Functional Status: moderate      Prognosis: good    Assessment      The pt presented to be suffering from grief over the loss of his mother and irritability/short temper. He struggles with anxiety at times about health as well. He is a good candidate for counseling as therapeutic rapport was established, he responded positively to interventions today, and he seems motivated to continue with counseling.      Plan      In order to diminish symptoms of grief, he is to continue with both counseling and processing this loss with loved ones/those he trusts (ongoing). He is also to practice the communication/mood-management exercises taught to him today and described above in detail (ongoing).     Velma Pringle, PhD, LP

## 2019-07-12 ENCOUNTER — OFFICE VISIT (OUTPATIENT)
Dept: PSYCHIATRY | Facility: CLINIC | Age: 29
End: 2019-07-12

## 2019-07-12 DIAGNOSIS — F33.1 MODERATE EPISODE OF RECURRENT MAJOR DEPRESSIVE DISORDER (HCC): Primary | ICD-10-CM

## 2019-07-12 PROCEDURE — 90847 FAMILY PSYTX W/PT 50 MIN: CPT | Performed by: PSYCHOLOGIST

## 2019-07-16 NOTE — PROGRESS NOTES
PROGRESS NOTE    Data:  Tr Brito is a 29 y.o. male who met with the undersigned for a scheduled couples counseling outpatient therapy session from 9:32 - 10:25am.      Clinical Maneuvering/Intervention:      The pt came with his girlfriend today for couples counseling. He is dealing with the death of his mother and grieving, but also arguing often with his girlfriend. He admits to being short tempered with her and that he does not want to be this way. His girlfriend (M) talked about feeling hurt by him emotionally and admitted to getting short tempered with him as well. Communication was a topic of work today. Education about healthy communication was provided. Education about how to better cope with anger (walking away, asking what the other person means before snapping on them, etc). It was noted as well that the pt can possibly be too controlling as well, worrying that if M spends time with certain people, that something bad will happen. M was assisted in expressing herself at different points, saying that she feels like he is too controlling and this leads to her feeling suffocated by him. Diminishing tension in the relationship was conducted. They were assisted in seeing how they may be spending too much time together (they work together, live together, and hang out together), or they may not be getting enough quality time when they do spend time together. Homework was assigned tailored to pts' needs in order to improve communication and improve instances of having quality time together/connecting better. The pts expressed gratitude for today's session.    Mental Status Exam  Hygiene:  good  Dress: normal  Attitude:  cooperative and proactive  Motor Activity: normal  Speech: normal  Mood:  depressed  Affect:  congruent  Thought Processes: normal  Thought Content:  normal  Suicidal Thoughts:  not endorsed  Homicidal Thoughts:  not endorsed  Crisis Safety Plan: not needed   Hallucinations:   none      Patient's Support Network Includes:  family, friends      Progress toward goal: there is evidence to suggest that they are taking measures to improve the quality of their life including seeking couples counseling and being open to the process.      Functional Status: moderate      Prognosis: good    Assessment      The pts presented as hurt, irritable, and depressed. They tend to feel stuck at times due to caring for one another but not knowing why they argue so much and struggling to deal with hurt feelings. They tend to benefit from education about how to improve communication, connection, and deal with anger.       Plan      In order to diminish symptoms depression and irritability, they are to: abstain from further discussion when at least one person is angry/worked up, spend less time together but more quality time together, and the pt is to practice using kinder words towards M/less controlling language in order to build her up and help her feel less suffocated (this week/ongoing).    Velma Pringle, PhD, LP

## 2019-08-19 ENCOUNTER — OFFICE VISIT (OUTPATIENT)
Dept: PSYCHIATRY | Facility: CLINIC | Age: 29
End: 2019-08-19

## 2019-08-19 DIAGNOSIS — F41.8 ANXIETY ABOUT HEALTH: ICD-10-CM

## 2019-08-19 DIAGNOSIS — F43.21 GRIEF: Primary | ICD-10-CM

## 2019-08-19 DIAGNOSIS — R45.4 IRRITABILITY AND ANGER: ICD-10-CM

## 2019-08-19 PROCEDURE — 90834 PSYTX W PT 45 MINUTES: CPT | Performed by: PSYCHOLOGIST

## 2019-08-19 NOTE — PROGRESS NOTES
PROGRESS NOTE    Data:  Tr Brito is a 29 y.o. male who met with the undersigned for a scheduled individual outpatient therapy session from 1:46 - 2:40pm.      Clinical Maneuvering/Intervention:      Pt talked about struggling with stress. He talked about how he has been arguing with his girlfriend (M) and still healing from the loss of his mother. He admits to often feeling irritable, but not always knowing why. Stressors were discussed one at a time and processed. Feelings were processed and validated. He was assisted with talking things out, but labeling the feelings as he can struggle with this. Education about self-expression and how it helps connect with others, decrease stress, etc was provided. Grief counseling was provided. Arguments with his girlfriend were discussed. He said that they may be breaking up but he is not sure so he feels upset. Identifying his role in the problems in the relationship was conducted in order to build insight and provide him tools to reconnect with her and/or learn about how to be better in relationships in general. Education about how to validate feelings was provided in session as this was noted as something he may need to practice. The pt's strengths were identified in order to help identify abilities to use to better face/overcome challenges. It was noted that he tends to humble himself and be open to learn, despite feeling irritable. Homework was assigned accordingly. The pt tended to focus on problems with his girlfriend in this session.     Mental Status Exam  Hygiene:  good  Dress: normal  Attitude:  cooperative and proactive  Motor Activity: normal  Speech: normal  Mood:  anxious and irritable  Affect:  congruent  Thought Processes: normal  Thought Content:  normal  Suicidal Thoughts:  not endorsed  Homicidal Thoughts:  not endorsed  Crisis Safety Plan: not needed   Hallucinations:  none      Patient's Support Network Includes:  family, friends      Progress  toward goal: there is evidence to suggest that he is taking measures to improve the quality of his life including being open to ways he can decrease emotional tension      Functional Status: moderate      Prognosis: good    Assessment      The pt presented to be suffering from grief over the loss of his mother and irritability/short temper. He struggles with anxiety at times about health as well. He tends to benefit from grief therapy and assistance with both expressing himself more effectively and managing anger.       Plan      In order to diminish symptoms of grief, he is to continue with both counseling and processing this loss with loved ones/those he trusts (ongoing). He is also to practice the communication/mood-management exercises reviewed today (ongoing).     Velma Pringle, PhD, LP

## 2019-08-22 ENCOUNTER — OFFICE VISIT (OUTPATIENT)
Dept: INTERNAL MEDICINE | Facility: CLINIC | Age: 29
End: 2019-08-22

## 2019-08-22 VITALS
SYSTOLIC BLOOD PRESSURE: 120 MMHG | DIASTOLIC BLOOD PRESSURE: 80 MMHG | RESPIRATION RATE: 16 BRPM | TEMPERATURE: 97.7 F | OXYGEN SATURATION: 98 % | WEIGHT: 196.8 LBS | HEART RATE: 83 BPM | BODY MASS INDEX: 26.51 KG/M2

## 2019-08-22 DIAGNOSIS — Z87.442 HISTORY OF KIDNEY STONES: ICD-10-CM

## 2019-08-22 DIAGNOSIS — R10.9 FLANK PAIN: Primary | ICD-10-CM

## 2019-08-22 LAB
BILIRUB BLD-MCNC: ABNORMAL MG/DL
CLARITY, POC: CLEAR
COLOR UR: YELLOW
EXPIRATION DATE: ABNORMAL
GLUCOSE UR STRIP-MCNC: NEGATIVE MG/DL
KETONES UR QL: NEGATIVE
LEUKOCYTE EST, POC: NEGATIVE
Lab: ABNORMAL
NITRITE UR-MCNC: NEGATIVE MG/ML
PH UR: 5 [PH] (ref 5–8)
PROT UR STRIP-MCNC: NEGATIVE MG/DL
RBC # UR STRIP: NEGATIVE /UL
SP GR UR: 1.01 (ref 1–1.03)
UROBILINOGEN UR QL: NORMAL

## 2019-08-22 PROCEDURE — 99213 OFFICE O/P EST LOW 20 MIN: CPT | Performed by: PHYSICIAN ASSISTANT

## 2019-08-22 PROCEDURE — 81003 URINALYSIS AUTO W/O SCOPE: CPT | Performed by: PHYSICIAN ASSISTANT

## 2019-08-22 RX ORDER — TAMSULOSIN HYDROCHLORIDE 0.4 MG/1
1 CAPSULE ORAL NIGHTLY
Qty: 7 CAPSULE | Refills: 0 | Status: SHIPPED | OUTPATIENT
Start: 2019-08-22 | End: 2020-02-07

## 2019-08-22 NOTE — PROGRESS NOTES
"Chief Complaint   Patient presents with   • Back Pain     x1 day, mainly kidney area, has had kidney stones        Subjective       History of Present Illness     Tr Brito is a 29 y.o. male. He presents with 1 day history of bilateral flank pain which is consistent with previous kidney stone symptoms. Pt states he developed pain yesterday at flanks bilaterally. He reports that pain begins as a \"sharp, shooting kidney stone pain\" and then resolves into a dull ache. Sharp pains occur every 1-2 hours. He had nausea yesterday but none today, and sharp pains are less frequency today. He does have chronic low back pain but states that current pain is higher in his back and feels similar to previous kidney stones. He has passed several kidney stones in the past and has never required lithotripsy or other intervention. Last kidney stones 2 years ago bilaterally. He denies dark urine, blood in urine, urinary frequency or urgency, abdominal pain, vomiting, diarrhea, or any other changes in urination or BMs. He has not tried anything for the tx of this issue.     The following portions of the patient's history were reviewed and updated as appropriate: allergies, current medications, past medical history, past social history and problem list.    No Known Allergies  Social History     Tobacco Use   • Smoking status: Never Smoker   • Smokeless tobacco: Never Used   Substance Use Topics   • Alcohol use: No         Current Outpatient Medications:   •  buPROPion XL (WELLBUTRIN XL) 300 MG 24 hr tablet, Take 1 tablet by mouth Daily., Disp: 30 tablet, Rfl: 3  •  cetirizine (zyrTEC) 10 MG tablet, Take 10 mg by mouth Daily., Disp: , Rfl:   •  hydrOXYzine (ATARAX) 50 MG tablet, Take 1 tablet by mouth 3 (Three) Times a Day As Needed for Itching., Disp: 50 tablet, Rfl: 2  •  traMADol (ULTRAM) 50 MG tablet, Take two tablets po Q6h prn, Disp: 120 tablet, Rfl: 1  •  diclofenac (VOLTAREN) 50 MG EC tablet, Take 1 tablet by mouth 2 " (Two) Times a Day., Disp: 20 tablet, Rfl: 0  •  tamsulosin (FLOMAX) 0.4 MG capsule 24 hr capsule, Take 1 capsule by mouth Every Night., Disp: 7 capsule, Rfl: 0    Review of Systems   Constitutional: Negative for chills, fatigue and fever.   HENT: Negative for sore throat.    Eyes: Negative for pain.   Respiratory: Negative for cough and shortness of breath.    Cardiovascular: Negative for chest pain.   Gastrointestinal: Positive for nausea. Negative for abdominal pain, blood in stool, diarrhea and vomiting.   Genitourinary: Positive for flank pain. Negative for decreased urine volume, difficulty urinating, dysuria, frequency, hematuria and urgency.   Musculoskeletal: Positive for back pain (chronic).   Neurological: Negative for dizziness and headache.       Objective   Vitals:    08/22/19 1307   BP: 120/80   Pulse: 83   Resp: 16   Temp: 97.7 °F (36.5 °C)   SpO2: 98%     Physical Exam   Constitutional: He appears well-developed and well-nourished.   HENT:   Head: Normocephalic and atraumatic.   Right Ear: Tympanic membrane, external ear and ear canal normal.   Left Ear: Tympanic membrane, external ear and ear canal normal.   Mouth/Throat: Oropharynx is clear and moist and mucous membranes are normal.   Eyes: Conjunctivae are normal.   Cardiovascular: Normal rate and regular rhythm.   No murmur heard.  Pulmonary/Chest: Effort normal and breath sounds normal. He has no wheezes. He has no rales.   Abdominal: Soft. He exhibits no distension and no mass. There is no hepatosplenomegaly. There is no tenderness. There is no rebound and no CVA tenderness.   Lymphadenopathy:     He has no cervical adenopathy.   Psychiatric: He has a normal mood and affect. His behavior is normal.           Results for orders placed or performed in visit on 08/22/19   POC Urinalysis Dipstick, Automated   Result Value Ref Range    Color Yellow Yellow, Straw, Dark Yellow, Mildred    Clarity, UA Clear Clear    Specific Gravity  1.015 1.005 - 1.030     pH, Urine 5.0 5.0 - 8.0    Leukocytes Negative Negative    Nitrite, UA Negative Negative    Protein, POC Negative Negative mg/dL    Glucose, UA Negative Negative, 1000 mg/dL (3+) mg/dL    Ketones, UA Negative Negative    Urobilinogen, UA Normal Normal    Bilirubin 1 mg/dL (A) Negative    Blood, UA Negative Negative    Lot Number [id number]     Expiration Date 2-29-20          Assessment/Plan   Tr was seen today for back pain.    Diagnoses and all orders for this visit:    Flank pain  -     POC Urinalysis Dipstick, Automated    History of kidney stones    Other orders  -     tamsulosin (FLOMAX) 0.4 MG capsule 24 hr capsule; Take 1 capsule by mouth Every Night.  -     diclofenac (VOLTAREN) 50 MG EC tablet; Take 1 tablet by mouth 2 (Two) Times a Day.      No blood on UA today.  Will treat with short course of flomax + diclofenac for possible kidney stones given his history. I do not think a repeat CT scan is warranted at this time, although he was advised to monitor closely and RTC for advanced imaging if symptoms persist.   Monitor urine for stones.          Return if symptoms worsen or fail to improve.

## 2019-08-28 PROBLEM — IMO0001 BLUES: Status: ACTIVE | Noted: 2019-08-28

## 2019-10-02 ENCOUNTER — OFFICE VISIT (OUTPATIENT)
Dept: INTERNAL MEDICINE | Facility: CLINIC | Age: 29
End: 2019-10-02

## 2019-10-02 VITALS
HEIGHT: 73 IN | TEMPERATURE: 97.8 F | OXYGEN SATURATION: 99 % | SYSTOLIC BLOOD PRESSURE: 142 MMHG | HEART RATE: 72 BPM | DIASTOLIC BLOOD PRESSURE: 90 MMHG | BODY MASS INDEX: 26.56 KG/M2 | WEIGHT: 200.4 LBS | RESPIRATION RATE: 16 BRPM

## 2019-10-02 DIAGNOSIS — Z13.220 LIPID SCREENING: ICD-10-CM

## 2019-10-02 DIAGNOSIS — Z00.00 WELL ADULT EXAM: ICD-10-CM

## 2019-10-02 DIAGNOSIS — M54.50 CHRONIC BILATERAL LOW BACK PAIN WITHOUT SCIATICA: ICD-10-CM

## 2019-10-02 DIAGNOSIS — F32.A DEPRESSION, UNSPECIFIED DEPRESSION TYPE: Primary | ICD-10-CM

## 2019-10-02 DIAGNOSIS — G89.29 CHRONIC BILATERAL LOW BACK PAIN WITHOUT SCIATICA: ICD-10-CM

## 2019-10-02 LAB
ALBUMIN SERPL-MCNC: 4.7 G/DL (ref 3.5–5.2)
ALBUMIN/GLOB SERPL: 1.7 G/DL
ALP SERPL-CCNC: 72 U/L (ref 39–117)
ALT SERPL W P-5'-P-CCNC: 20 U/L (ref 1–41)
ANION GAP SERPL CALCULATED.3IONS-SCNC: 12.8 MMOL/L (ref 5–15)
AST SERPL-CCNC: 16 U/L (ref 1–40)
BILIRUB SERPL-MCNC: 0.3 MG/DL (ref 0.2–1.2)
BUN BLD-MCNC: 16 MG/DL (ref 6–20)
BUN/CREAT SERPL: 15.7 (ref 7–25)
CALCIUM SPEC-SCNC: 9.2 MG/DL (ref 8.6–10.5)
CHLORIDE SERPL-SCNC: 97 MMOL/L (ref 98–107)
CHOLEST SERPL-MCNC: 205 MG/DL (ref 0–200)
CO2 SERPL-SCNC: 27.2 MMOL/L (ref 22–29)
CREAT BLD-MCNC: 1.02 MG/DL (ref 0.76–1.27)
DEPRECATED RDW RBC AUTO: 45.8 FL (ref 37–54)
ERYTHROCYTE [DISTWIDTH] IN BLOOD BY AUTOMATED COUNT: 13.5 % (ref 12.3–15.4)
GFR SERPL CREATININE-BSD FRML MDRD: 88 ML/MIN/1.73
GLOBULIN UR ELPH-MCNC: 2.8 GM/DL
GLUCOSE BLD-MCNC: 87 MG/DL (ref 65–99)
HCT VFR BLD AUTO: 43.8 % (ref 37.5–51)
HDLC SERPL-MCNC: 65 MG/DL (ref 40–60)
HGB BLD-MCNC: 14.9 G/DL (ref 13–17.7)
LDLC SERPL CALC-MCNC: 120 MG/DL (ref 0–100)
LDLC/HDLC SERPL: 1.85 {RATIO}
MCH RBC QN AUTO: 31.8 PG (ref 26.6–33)
MCHC RBC AUTO-ENTMCNC: 34 G/DL (ref 31.5–35.7)
MCV RBC AUTO: 93.6 FL (ref 79–97)
PLATELET # BLD AUTO: 217 10*3/MM3 (ref 140–450)
PMV BLD AUTO: 10.7 FL (ref 6–12)
POTASSIUM BLD-SCNC: 4.5 MMOL/L (ref 3.5–5.2)
PROT SERPL-MCNC: 7.5 G/DL (ref 6–8.5)
RBC # BLD AUTO: 4.68 10*6/MM3 (ref 4.14–5.8)
SODIUM BLD-SCNC: 137 MMOL/L (ref 136–145)
T4 FREE SERPL-MCNC: 1.18 NG/DL (ref 0.93–1.7)
TRIGL SERPL-MCNC: 98 MG/DL (ref 0–150)
TSH SERPL DL<=0.05 MIU/L-ACNC: 0.7 UIU/ML (ref 0.27–4.2)
VLDLC SERPL-MCNC: 19.6 MG/DL (ref 5–40)
WBC NRBC COR # BLD: 5.46 10*3/MM3 (ref 3.4–10.8)

## 2019-10-02 PROCEDURE — 84439 ASSAY OF FREE THYROXINE: CPT | Performed by: INTERNAL MEDICINE

## 2019-10-02 PROCEDURE — 99395 PREV VISIT EST AGE 18-39: CPT | Performed by: INTERNAL MEDICINE

## 2019-10-02 PROCEDURE — 36415 COLL VENOUS BLD VENIPUNCTURE: CPT | Performed by: INTERNAL MEDICINE

## 2019-10-02 PROCEDURE — 84443 ASSAY THYROID STIM HORMONE: CPT | Performed by: INTERNAL MEDICINE

## 2019-10-02 PROCEDURE — 80061 LIPID PANEL: CPT | Performed by: INTERNAL MEDICINE

## 2019-10-02 PROCEDURE — 85027 COMPLETE CBC AUTOMATED: CPT | Performed by: INTERNAL MEDICINE

## 2019-10-02 PROCEDURE — 80053 COMPREHEN METABOLIC PANEL: CPT | Performed by: INTERNAL MEDICINE

## 2019-10-02 NOTE — PROGRESS NOTES
"Subjective   Tr Brito is a 29 y.o. male.     History of Present Illness     The following portions of the patient's history were reviewed and updated as appropriate: allergies, current medications, past family history, past medical history, past social history, past surgical history and problem list.          Complete Adult Physical    1 depression -chronic and doing well.  Patient continues with the Wellbutrin and has had no side effects on medication.  It has been about 6 months since his mother has passed and he is doing fairly well with the grieving process.  He does have his \"moments \"but overall he says that he has an excellent social support    2 low back panic- chronic, recurrent.  Patient says that his back pain is getting worse.  Difficulty with prolonged standing, twisting, or flexion.  Patient would like to be considered referral to a chronic pain management specialist for evaluation        Diet regular        Exercise minimal to none        Social History: No EtOH consumption, no IV drug use, no marijuana, no illicit drugs.        Preventative Screenings  Needs to go see an ophthalmologist        Immunizations:          Review of Systems   All other systems reviewed and are negative.      Objective   Physical Exam   Constitutional: He is oriented to person, place, and time. He appears well-developed and well-nourished.   HENT:   Head: Normocephalic.   Right Ear: External ear normal.   Left Ear: External ear normal.   Nose: Nose normal.   Mouth/Throat: Oropharynx is clear and moist.   Eyes: Conjunctivae and EOM are normal. Pupils are equal, round, and reactive to light.   Neck: Normal range of motion. Neck supple.   Cardiovascular: Normal rate, regular rhythm and normal heart sounds.   Pulmonary/Chest: Effort normal and breath sounds normal.   Abdominal: Soft. Bowel sounds are normal.   Genitourinary: Penis normal.   Musculoskeletal: Normal range of motion.   Neurological: He is alert and " oriented to person, place, and time.   Skin: Skin is warm.   Psychiatric: He has a normal mood and affect. His behavior is normal. Judgment and thought content normal.   Nursing note and vitals reviewed.        Assessment/Plan   Tr was seen today for annual exam.    Diagnoses and all orders for this visit:    Depression, unspecified depression type-continue on current medications for depression.    Well adult exam  -     CBC (No Diff)  -     Comprehensive Metabolic Panel  -     T4, Free  -     TSH    Chronic bilateral low back pain without sciatica  -     Ambulatory Referral to Pain Management    Lipid screening  -     Lipid Panel    Anticipatory guidance:  Recommend routine dental visit.  Recommend routine ophthalmology visit.

## 2019-11-11 ENCOUNTER — TELEPHONE (OUTPATIENT)
Dept: PAIN MEDICINE | Facility: CLINIC | Age: 29
End: 2019-11-11

## 2019-11-11 DIAGNOSIS — Z00.8 PRE-SURGICAL PSYCHOLOGICAL ASSESSMENT, ENCOUNTER FOR: Primary | ICD-10-CM

## 2019-12-19 ENCOUNTER — APPOINTMENT (OUTPATIENT)
Dept: GENERAL RADIOLOGY | Facility: HOSPITAL | Age: 29
End: 2019-12-19

## 2019-12-19 ENCOUNTER — APPOINTMENT (OUTPATIENT)
Dept: CT IMAGING | Facility: HOSPITAL | Age: 29
End: 2019-12-19

## 2019-12-19 ENCOUNTER — HOSPITAL ENCOUNTER (EMERGENCY)
Facility: HOSPITAL | Age: 29
Discharge: HOME | End: 2019-12-19
Attending: EMERGENCY MEDICINE | Admitting: EMERGENCY MEDICINE

## 2019-12-19 VITALS
TEMPERATURE: 98.6 F | OXYGEN SATURATION: 98 % | RESPIRATION RATE: 16 BRPM | HEIGHT: 73 IN | HEART RATE: 80 BPM | DIASTOLIC BLOOD PRESSURE: 98 MMHG | BODY MASS INDEX: 25.84 KG/M2 | SYSTOLIC BLOOD PRESSURE: 142 MMHG | WEIGHT: 195 LBS

## 2019-12-19 DIAGNOSIS — J01.20 ACUTE NON-RECURRENT ETHMOIDAL SINUSITIS: ICD-10-CM

## 2019-12-19 DIAGNOSIS — Z78.9 ALCOHOL USE: ICD-10-CM

## 2019-12-19 DIAGNOSIS — R03.0 ELEVATED BLOOD-PRESSURE READING WITHOUT DIAGNOSIS OF HYPERTENSION: ICD-10-CM

## 2019-12-19 DIAGNOSIS — R51.9 ACUTE NONINTRACTABLE HEADACHE, UNSPECIFIED HEADACHE TYPE: Primary | ICD-10-CM

## 2019-12-19 DIAGNOSIS — F41.9 ANXIETY: ICD-10-CM

## 2019-12-19 DIAGNOSIS — R79.89 ELEVATED LFTS: ICD-10-CM

## 2019-12-19 LAB
ALBUMIN SERPL-MCNC: 4.7 G/DL (ref 3.5–5.2)
ALBUMIN/GLOB SERPL: 1.5 G/DL
ALP SERPL-CCNC: 81 U/L (ref 39–117)
ALT SERPL W P-5'-P-CCNC: 63 U/L (ref 1–41)
ANION GAP SERPL CALCULATED.3IONS-SCNC: 13 MMOL/L (ref 5–15)
AST SERPL-CCNC: 57 U/L (ref 1–40)
BASOPHILS # BLD AUTO: 0.02 10*3/MM3 (ref 0–0.2)
BASOPHILS NFR BLD AUTO: 0.3 % (ref 0–1.5)
BILIRUB SERPL-MCNC: 0.4 MG/DL (ref 0.2–1.2)
BILIRUB UR QL STRIP: NEGATIVE
BUN BLD-MCNC: 7 MG/DL (ref 6–20)
BUN/CREAT SERPL: 9.3 (ref 7–25)
CALCIUM SPEC-SCNC: 9.5 MG/DL (ref 8.6–10.5)
CHLORIDE SERPL-SCNC: 98 MMOL/L (ref 98–107)
CLARITY UR: CLEAR
CO2 SERPL-SCNC: 27 MMOL/L (ref 22–29)
COLOR UR: YELLOW
CREAT BLD-MCNC: 0.75 MG/DL (ref 0.76–1.27)
DEPRECATED RDW RBC AUTO: 43.8 FL (ref 37–54)
EOSINOPHIL # BLD AUTO: 0.02 10*3/MM3 (ref 0–0.4)
EOSINOPHIL NFR BLD AUTO: 0.3 % (ref 0.3–6.2)
ERYTHROCYTE [DISTWIDTH] IN BLOOD BY AUTOMATED COUNT: 12.3 % (ref 12.3–15.4)
GFR SERPL CREATININE-BSD FRML MDRD: 125 ML/MIN/1.73
GLOBULIN UR ELPH-MCNC: 3.1 GM/DL
GLUCOSE BLD-MCNC: 98 MG/DL (ref 65–99)
GLUCOSE UR STRIP-MCNC: NEGATIVE MG/DL
HCT VFR BLD AUTO: 44.9 % (ref 37.5–51)
HGB BLD-MCNC: 15 G/DL (ref 13–17.7)
HGB UR QL STRIP.AUTO: NEGATIVE
IMM GRANULOCYTES # BLD AUTO: 0.02 10*3/MM3 (ref 0–0.05)
IMM GRANULOCYTES NFR BLD AUTO: 0.3 % (ref 0–0.5)
KETONES UR QL STRIP: NEGATIVE
LEUKOCYTE ESTERASE UR QL STRIP.AUTO: NEGATIVE
LYMPHOCYTES # BLD AUTO: 1.26 10*3/MM3 (ref 0.7–3.1)
LYMPHOCYTES NFR BLD AUTO: 16.7 % (ref 19.6–45.3)
MAGNESIUM SERPL-MCNC: 1.7 MG/DL (ref 1.6–2.6)
MCH RBC QN AUTO: 31.8 PG (ref 26.6–33)
MCHC RBC AUTO-ENTMCNC: 33.4 G/DL (ref 31.5–35.7)
MCV RBC AUTO: 95.3 FL (ref 79–97)
MONOCYTES # BLD AUTO: 0.41 10*3/MM3 (ref 0.1–0.9)
MONOCYTES NFR BLD AUTO: 5.4 % (ref 5–12)
NEUTROPHILS # BLD AUTO: 5.83 10*3/MM3 (ref 1.7–7)
NEUTROPHILS NFR BLD AUTO: 77 % (ref 42.7–76)
NITRITE UR QL STRIP: NEGATIVE
NRBC BLD AUTO-RTO: 0 /100 WBC (ref 0–0.2)
PH UR STRIP.AUTO: 7 [PH] (ref 5–8)
PLATELET # BLD AUTO: 242 10*3/MM3 (ref 140–450)
PMV BLD AUTO: 9.8 FL (ref 6–12)
POTASSIUM BLD-SCNC: 4.7 MMOL/L (ref 3.5–5.2)
PROT SERPL-MCNC: 7.8 G/DL (ref 6–8.5)
PROT UR QL STRIP: NEGATIVE
RBC # BLD AUTO: 4.71 10*6/MM3 (ref 4.14–5.8)
SODIUM BLD-SCNC: 138 MMOL/L (ref 136–145)
SP GR UR STRIP: 1.01 (ref 1–1.03)
TSH SERPL DL<=0.05 MIU/L-ACNC: 0.99 UIU/ML (ref 0.27–4.2)
UROBILINOGEN UR QL STRIP: NORMAL
WBC NRBC COR # BLD: 7.56 10*3/MM3 (ref 3.4–10.8)

## 2019-12-19 PROCEDURE — 25010000002 DIPHENHYDRAMINE PER 50 MG: Performed by: PHYSICIAN ASSISTANT

## 2019-12-19 PROCEDURE — 93005 ELECTROCARDIOGRAM TRACING: CPT | Performed by: PHYSICIAN ASSISTANT

## 2019-12-19 PROCEDURE — 71045 X-RAY EXAM CHEST 1 VIEW: CPT

## 2019-12-19 PROCEDURE — 84443 ASSAY THYROID STIM HORMONE: CPT | Performed by: PHYSICIAN ASSISTANT

## 2019-12-19 PROCEDURE — 96374 THER/PROPH/DIAG INJ IV PUSH: CPT

## 2019-12-19 PROCEDURE — 80053 COMPREHEN METABOLIC PANEL: CPT | Performed by: PHYSICIAN ASSISTANT

## 2019-12-19 PROCEDURE — 99284 EMERGENCY DEPT VISIT MOD MDM: CPT

## 2019-12-19 PROCEDURE — 96375 TX/PRO/DX INJ NEW DRUG ADDON: CPT

## 2019-12-19 PROCEDURE — 81003 URINALYSIS AUTO W/O SCOPE: CPT | Performed by: PHYSICIAN ASSISTANT

## 2019-12-19 PROCEDURE — 25010000002 METOCLOPRAMIDE PER 10 MG: Performed by: PHYSICIAN ASSISTANT

## 2019-12-19 PROCEDURE — 25010000002 KETOROLAC TROMETHAMINE PER 15 MG: Performed by: PHYSICIAN ASSISTANT

## 2019-12-19 PROCEDURE — 85025 COMPLETE CBC W/AUTO DIFF WBC: CPT | Performed by: PHYSICIAN ASSISTANT

## 2019-12-19 PROCEDURE — 70450 CT HEAD/BRAIN W/O DYE: CPT

## 2019-12-19 PROCEDURE — 25010000002 ONDANSETRON PER 1 MG: Performed by: PHYSICIAN ASSISTANT

## 2019-12-19 PROCEDURE — 83735 ASSAY OF MAGNESIUM: CPT | Performed by: PHYSICIAN ASSISTANT

## 2019-12-19 RX ORDER — KETOROLAC TROMETHAMINE 30 MG/ML
30 INJECTION, SOLUTION INTRAMUSCULAR; INTRAVENOUS ONCE
Status: COMPLETED | OUTPATIENT
Start: 2019-12-19 | End: 2019-12-19

## 2019-12-19 RX ORDER — AMOXICILLIN AND CLAVULANATE POTASSIUM 875; 125 MG/1; MG/1
1 TABLET, FILM COATED ORAL EVERY 12 HOURS
Qty: 14 TABLET | Refills: 0 | Status: SHIPPED | OUTPATIENT
Start: 2019-12-19 | End: 2020-02-07

## 2019-12-19 RX ORDER — ONDANSETRON 2 MG/ML
4 INJECTION INTRAMUSCULAR; INTRAVENOUS ONCE
Status: COMPLETED | OUTPATIENT
Start: 2019-12-19 | End: 2019-12-19

## 2019-12-19 RX ORDER — DIPHENHYDRAMINE HYDROCHLORIDE 50 MG/ML
25 INJECTION INTRAMUSCULAR; INTRAVENOUS ONCE
Status: COMPLETED | OUTPATIENT
Start: 2019-12-19 | End: 2019-12-19

## 2019-12-19 RX ORDER — METOCLOPRAMIDE HYDROCHLORIDE 5 MG/ML
10 INJECTION INTRAMUSCULAR; INTRAVENOUS ONCE
Status: COMPLETED | OUTPATIENT
Start: 2019-12-19 | End: 2019-12-19

## 2019-12-19 RX ADMIN — KETOROLAC TROMETHAMINE 30 MG: 30 INJECTION, SOLUTION INTRAMUSCULAR; INTRAVENOUS at 22:03

## 2019-12-19 RX ADMIN — SODIUM CHLORIDE 1000 ML: 9 INJECTION, SOLUTION INTRAVENOUS at 22:04

## 2019-12-19 RX ADMIN — ONDANSETRON 4 MG: 2 INJECTION INTRAMUSCULAR; INTRAVENOUS at 22:03

## 2019-12-19 RX ADMIN — METOCLOPRAMIDE 10 MG: 5 INJECTION, SOLUTION INTRAMUSCULAR; INTRAVENOUS at 22:03

## 2019-12-19 RX ADMIN — DIPHENHYDRAMINE HYDROCHLORIDE 25 MG: 50 INJECTION INTRAMUSCULAR; INTRAVENOUS at 22:03

## 2019-12-20 ENCOUNTER — TELEPHONE (OUTPATIENT)
Dept: INTERNAL MEDICINE | Facility: CLINIC | Age: 29
End: 2019-12-20

## 2019-12-20 NOTE — TELEPHONE ENCOUNTER
*NO INFORMATION WAS PROVIDED TO THE AUNT REGARDING THIS PT*    RASHAWN ROY (PT AUNT) CALLED IN WITH CONCERNS ABOUT THE PT SHE STATES HIS MOM PASSED IN April OF THIS YR AND HE BROKE UP WITH GIRLFRIEND SINCE THAT TIME HE IS HAVING ISSUES WITH DRINKING ALCOHOL ON A DAILY BASIS TO THE POINT WHERE THE PT IS WAKING UP SHAKING THE AUNT FURTHER STATES THE PT STATES HE HAS BEEN TRYING TO GET AN APPT WITH DR COCHRAN BECAUSE HE REALLY LIKES DR COCHRAN THE AUNT IS  HOPING DR COCHRAN CAN SEE THE PT AND RECOMMEND SOME THERAPY OR SOMETHING THAT MAY HELP HER NEPHEW THE AUNTS CONTACT INFO IS LISTED BELOW    CB NUMBER: [phone]

## 2019-12-20 NOTE — ED PROVIDER NOTES
Subjective   Tr Brito is a 29 y.o. male who presents to the ED with c/o headache. The patient reports he has had increased stress and anxiety within the past year which has caused him to consume increasing amounts of alcohol at night time. He states that for the past 1-2 months his upper extremities have been intermittently weak, with the weakness alternating between arms or happening at the same time bilaterally. Earlier today the patient began having a throbbing, tight, constant posterior headache along with numbness in 2 of his fingers on his right hand, prompting him to present to the ED for evaluation. He complains of nausea, congestion, mild neck pain, and mild shortness of breath but denies visual disturbances, dizziness, chest pain, weakness in his bilateral lower extremities, and leg swelling. The patient's headache has been ongoing for the past week but he has no prior history of migraines. He reports that his symptoms normally recede once he begins drinking but he is becoming uncomfortable and concerned about his drinking. The patient has a past medical history of colitis and kidney stones but has no history of hypertension. He has a family history of hypertension but has no recently family history of stroke.  Patient's family is at the bedside with him and patient and family members request information for counseling for grieving.  There are no other acute complaints at this time.      History provided by:  Patient  Headache   Pain location:  Occipital  Quality:  Sharp (throbbing and tight)  Onset quality:  Sudden  Duration:  1 day  Timing:  Constant  Progression:  Worsening  Chronicity:  New  Similar to prior headaches: no    Context: emotional stress    Relieved by: alcohol.  Worsened by:  Nothing  Ineffective treatments:  Resting in a darkened room  Associated symptoms: back pain (History of bulging discs to lumbar spine), congestion, drainage, nausea, neck pain (muscular tightness),  numbness, sinus pressure and weakness    Associated symptoms: no abdominal pain, no blurred vision, no dizziness, no fatigue, no fever, no near-syncope, no neck stiffness, no sore throat and no visual change    Risk factors: no anger, no family hx of SAH and does not have insomnia        Review of Systems   Constitutional: Negative for appetite change, chills, fatigue and fever.   HENT: Positive for congestion, postnasal drip and sinus pressure. Negative for sore throat and tinnitus.    Eyes: Negative for blurred vision and visual disturbance.   Respiratory: Positive for shortness of breath.    Cardiovascular: Negative for chest pain, leg swelling and near-syncope.   Gastrointestinal: Positive for nausea. Negative for abdominal pain.   Genitourinary: Negative.    Musculoskeletal: Positive for back pain (History of bulging discs to lumbar spine) and neck pain (muscular tightness). Negative for neck stiffness.   Neurological: Positive for weakness, numbness and headaches. Negative for dizziness.        The patient complains of weakness in his upper extremities bilaterally but denies weakness in his lower extremities.   Psychiatric/Behavioral:        Increased ETOH use nightly for the past 2 months.  Recent death of patient's mother and girlfriend.   All other systems reviewed and are negative.      Past Medical History:   Diagnosis Date   • Colitis    • Kidney stone        No Known Allergies    Past Surgical History:   Procedure Laterality Date   • NOSE SURGERY      fracture       Family History   Problem Relation Age of Onset   • ADD / ADHD Mother    • Anemia Mother    • Diabetes Mother    • Hypertension Mother    • Mental illness Mother    • Obesity Mother    • Diabetes Father    • Hypertension Father    • Hyperlipidemia Father    • Arthritis Maternal Grandmother    • Heart attack Paternal Grandmother        Social History     Socioeconomic History   • Marital status: Single     Spouse name: Not on file   • Number  of children: Not on file   • Years of education: Not on file   • Highest education level: Not on file   Tobacco Use   • Smoking status: Never Smoker   • Smokeless tobacco: Never Used   Substance and Sexual Activity   • Alcohol use: No   • Drug use: No         Objective   Physical Exam   Constitutional: He is oriented to person, place, and time. He appears well-developed and well-nourished. No distress.   HENT:   Head: Normocephalic and atraumatic.   Right Ear: External ear normal.   Left Ear: External ear normal.   Nose: Right sinus exhibits no maxillary sinus tenderness and no frontal sinus tenderness. Left sinus exhibits no maxillary sinus tenderness and no frontal sinus tenderness.   Mouth/Throat: Oropharynx is clear and moist.   No particular scalp tenderness to palpation.   Eyes: Pupils are equal, round, and reactive to light. Conjunctivae and EOM are normal. No scleral icterus. Right eye exhibits no nystagmus. Left eye exhibits no nystagmus.   Neck: Normal range of motion. Neck supple. Carotid bruit is not present. No thyromegaly present.   Full range of motion of the neck without meningeal signs.   Cardiovascular: Normal rate, regular rhythm and normal heart sounds.   No murmur heard.  Pulmonary/Chest: Effort normal and breath sounds normal. No respiratory distress.   Abdominal: Soft. There is no tenderness.   Musculoskeletal: Normal range of motion. He exhibits no edema or tenderness.        Cervical back: He exhibits no tenderness and no bony tenderness.   No cervical spine or cervical myofascial tenderness to palpation.   Neurological: He is alert and oriented to person, place, and time.   Patient is neurologically intact and non-focal.   Skin: Skin is warm and dry.   Psychiatric: He has a normal mood and affect. His speech is normal and behavior is normal. He expresses no suicidal ideation. He expresses no suicidal plans.   Patient appears calm and cooperative.  No anxiety or agitation at this time.    Nursing note and vitals reviewed.      Procedures         ED Course  ED Course as of Dec 19 2257   Thu Dec 19, 2019   2204 CT of the head without contrast shows no acute intracranial hemorrhage, mass, or infarct.  Patient does have evidence of bilateral ethmoid sinusitis.  Blood pressure was initially 152/96.  Patient has no history of hypertension, but he does report strong family history of hypertension.  He does have a primary care physician, Dr. Hurtado.  We will give patient fluid bolus and migraine cocktail and check other labs, including TSH.  Recommend very close PCP follow-up for recheck on elevated blood pressure.    [FC]   2247 CBC was within normal limits.  Chemistries reveal mild elevation in LFTs.  ALT was 63 and AST is 57.  Alk phos is normal at 81 and total bilirubin is 0.4.  LFTs 2 months ago were completely normal.  Patient does report nightly alcohol use.  Strongly recommend cessation.  Urinalysis shows no proteinuria and no acute infectious process.  Chest x-ray is within normal limits.  TSH was normal at 0.992.  Magnesium level is normal at 1.7.  Blood pressure improved to 142/98.  Patient does report increased sinus pressure and congestion.  With evidence of ethmoid sinusitis, we will treat with Augmentin.  I also recommend close PCP follow-up for recheck on elevated blood pressure and alcohol use.  We also will give patient follow-up information for [facility] for services regarding the grieving process with recent death of his mother and girlfriend.  Patient is very appreciative.  Headache improved with IV fluids and medications.  He is ready for discharge to home.    [FC]      ED Course User Index  [FC] Brii Horan, NICKO       Recent Results (from the past 24 hour(s))   Comprehensive Metabolic Panel    Collection Time: 12/19/19 10:01 PM   Result Value Ref Range    Glucose 98 65 - 99 mg/dL    BUN 7 6 - 20 mg/dL    Creatinine 0.75 (L) 0.76 - 1.27 mg/dL    Sodium 138 136 - 145 mmol/L     Potassium 4.7 3.5 - 5.2 mmol/L    Chloride 98 98 - 107 mmol/L    CO2 27.0 22.0 - 29.0 mmol/L    Calcium 9.5 8.6 - 10.5 mg/dL    Total Protein 7.8 6.0 - 8.5 g/dL    Albumin 4.70 3.50 - 5.20 g/dL    ALT (SGPT) 63 (H) 1 - 41 U/L    AST (SGOT) 57 (H) 1 - 40 U/L    Alkaline Phosphatase 81 39 - 117 U/L    Total Bilirubin 0.4 0.2 - 1.2 mg/dL    eGFR Non African Amer 125 >60 mL/min/1.73    Globulin 3.1 gm/dL    A/G Ratio 1.5 g/dL    BUN/Creatinine Ratio 9.3 7.0 - 25.0    Anion Gap 13.0 5.0 - 15.0 mmol/L   TSH    Collection Time: 12/19/19 10:01 PM   Result Value Ref Range    TSH 0.992 0.270 - 4.200 uIU/mL   Magnesium    Collection Time: 12/19/19 10:01 PM   Result Value Ref Range    Magnesium 1.7 1.6 - 2.6 mg/dL   CBC Auto Differential    Collection Time: 12/19/19 10:01 PM   Result Value Ref Range    WBC 7.56 3.40 - 10.80 10*3/mm3    RBC 4.71 4.14 - 5.80 10*6/mm3    Hemoglobin 15.0 13.0 - 17.7 g/dL    Hematocrit 44.9 37.5 - 51.0 %    MCV 95.3 79.0 - 97.0 fL    MCH 31.8 26.6 - 33.0 pg    MCHC 33.4 31.5 - 35.7 g/dL    RDW 12.3 12.3 - 15.4 %    RDW-SD 43.8 37.0 - 54.0 fl    MPV 9.8 6.0 - 12.0 fL    Platelets 242 140 - 450 10*3/mm3    Neutrophil % 77.0 (H) 42.7 - 76.0 %    Lymphocyte % 16.7 (L) 19.6 - 45.3 %    Monocyte % 5.4 5.0 - 12.0 %    Eosinophil % 0.3 0.3 - 6.2 %    Basophil % 0.3 0.0 - 1.5 %    Immature Grans % 0.3 0.0 - 0.5 %    Neutrophils, Absolute 5.83 1.70 - 7.00 10*3/mm3    Lymphocytes, Absolute 1.26 0.70 - 3.10 10*3/mm3    Monocytes, Absolute 0.41 0.10 - 0.90 10*3/mm3    Eosinophils, Absolute 0.02 0.00 - 0.40 10*3/mm3    Basophils, Absolute 0.02 0.00 - 0.20 10*3/mm3    Immature Grans, Absolute 0.02 0.00 - 0.05 10*3/mm3    nRBC 0.0 0.0 - 0.2 /100 WBC   Urinalysis With Microscopic If Indicated (No Culture) - Urine, Clean Catch    Collection Time: 12/19/19 10:18 PM   Result Value Ref Range    Color, UA Yellow Yellow, Straw    Appearance, UA Clear Clear    pH, UA 7.0 5.0 - 8.0    Specific Gravity, UA 1.006 1.001 -  "1.030    Glucose, UA Negative Negative    Ketones, UA Negative Negative    Bilirubin, UA Negative Negative    Blood, UA Negative Negative    Protein, UA Negative Negative    Leuk Esterase, UA Negative Negative    Nitrite, UA Negative Negative    Urobilinogen, UA 0.2 E.U./dL 0.2 - 1.0 E.U./dL     Note: In addition to lab results from this visit, the labs listed above may include labs taken at another facility or during a different encounter within the last 24 hours. Please correlate lab times with ED admission and discharge times for further clarification of the services performed during this visit.    XR Chest 1 View   Final Result   Negative chest.      Signer Name: Nolberto Ramos MD    Signed: 12/19/2019 10:02 PM    Workstation Name: [facility]     [facility]      CT Head Without Contrast   Final Result   Bilateral ethmoid sinusitis. Otherwise negative noncontrasted head CT.               Signer Name: Rubia Perez MD    Signed: 12/19/2019 8:01 PM    Workstation [facility]: [facility]     [facility]        Vitals:    12/19/19 1940 12/19/19 2100 12/19/19 2130   BP: 152/96 (!) 150/102 142/98   BP Location: Left arm     Patient Position: Sitting     Pulse: 77 89 80   Resp: 16  16   Temp: 98.6 °F (37 °C)     TempSrc: Oral     SpO2: 100% 99% 98%   Weight: 88.5 kg (195 lb)     Height: 185.4 cm (73\")       Medications   sodium chloride 0.9 % bolus 1,000 mL (1,000 mL Intravenous New Bag 12/19/19 2204)   metoclopramide (REGLAN) injection 10 mg (10 mg Intravenous Given 12/19/19 2203)   ketorolac (TORADOL) injection 30 mg (30 mg Intravenous Given 12/19/19 2203)   diphenhydrAMINE (BENADRYL) injection 25 mg (25 mg Intravenous Given 12/19/19 2203)   ondansetron (ZOFRAN) injection 4 mg (4 mg Intravenous Given 12/19/19 2203)     ECG/EMG Results (last 24 hours)     ** No results found for the last 24 hours. **        ECG 12 Lead                           MDM    Final " diagnoses:   Acute nonintractable headache, unspecified headache type   Elevated blood-pressure reading without diagnosis of hypertension   Acute non-recurrent ethmoidal sinusitis   Alcohol use   Anxiety   Elevated LFTs       Documentation assistance provided by josef Dunaway.  Information recorded by the yoniibdian was done at my direction and has been verified and validated by me.     Hemant Dunaway  12/19/19 2153       Hemant Dunaway  12/19/19 2207       Brii Horan PA-C  12/19/19 [id number]

## 2019-12-20 NOTE — DISCHARGE INSTRUCTIONS
ER evaluation reveals mild elevation in liver function studies.  ALT was 63 and AST was 57.  Suspect from alcohol use.  Strongly recommend alcohol cessation.  CT of the brain without contrast showed no acute intracranial abnormality.  There was evidence of bilateral ethmoid sinusitis.  Continue with Zyrtec daily.  Rx for Augmentin 875 mg twice daily x7 days.  Recommend close follow-up with Dr. Hurtado for recheck on elevated blood pressure.  Recommend no over-the-counter decongestants, which may also increase blood pressure.  We will give patient information for counseling at [facility].  Patient needs to continue with all other current medical management.  Thyroid studies and all other labs were within normal limits.  Return to the ER if any worsening symptoms.

## 2019-12-24 NOTE — TELEPHONE ENCOUNTER
Attempted to reach Aunt Amalia at [phone] [facility] to call back. Gave office number

## 2019-12-24 NOTE — TELEPHONE ENCOUNTER
The earliest I can get him in is January 9, 2020 at 3:30pm(go ahead and make this an extended visit for both the 330 and 345 time slot) he will probably need this due to the mental health issues going on.    As always if he has any worsening of his mental state i.e. worsening depression, anxiety, or suicidal he should proceed to [facility] or [facility]

## 2019-12-26 NOTE — TELEPHONE ENCOUNTER
Attempted to reach Aunt Amalia at [phone] [facility] to call back. Gave office number

## 2020-02-07 ENCOUNTER — OFFICE VISIT (OUTPATIENT)
Dept: INTERNAL MEDICINE | Facility: CLINIC | Age: 30
End: 2020-02-07

## 2020-02-07 ENCOUNTER — PRIOR AUTHORIZATION (OUTPATIENT)
Dept: INTERNAL MEDICINE | Facility: CLINIC | Age: 30
End: 2020-02-07

## 2020-02-07 VITALS
HEART RATE: 73 BPM | TEMPERATURE: 98.4 F | OXYGEN SATURATION: 98 % | WEIGHT: 200.38 LBS | SYSTOLIC BLOOD PRESSURE: 122 MMHG | DIASTOLIC BLOOD PRESSURE: 90 MMHG | HEIGHT: 73 IN | BODY MASS INDEX: 26.56 KG/M2 | RESPIRATION RATE: 20 BRPM

## 2020-02-07 DIAGNOSIS — Z23 NEED FOR INFLUENZA VACCINATION: Primary | ICD-10-CM

## 2020-02-07 DIAGNOSIS — Z71.89 GRIEF COUNSELING: ICD-10-CM

## 2020-02-07 DIAGNOSIS — F32.A DEPRESSION, UNSPECIFIED DEPRESSION TYPE: ICD-10-CM

## 2020-02-07 DIAGNOSIS — F51.01 PRIMARY INSOMNIA: ICD-10-CM

## 2020-02-07 PROCEDURE — 99214 OFFICE O/P EST MOD 30 MIN: CPT | Performed by: INTERNAL MEDICINE

## 2020-02-07 PROCEDURE — 90471 IMMUNIZATION ADMIN: CPT | Performed by: INTERNAL MEDICINE

## 2020-02-07 PROCEDURE — 90686 IIV4 VACC NO PRSV 0.5 ML IM: CPT | Performed by: INTERNAL MEDICINE

## 2020-02-07 RX ORDER — VILAZODONE HYDROCHLORIDE 20 MG/1
20 TABLET ORAL DAILY
Qty: 30 TABLET | Refills: 3 | Status: SHIPPED | OUTPATIENT
Start: 2020-02-07 | End: 2021-01-25

## 2020-02-07 NOTE — PROGRESS NOTES
"Subjective   Tr Brito is a 29 y.o. male.     History of Present Illness     The following portions of the patient's history were reviewed and updated as appropriate: allergies, current medications, past family history, past medical history, past social history, past surgical history and problem list.    1 depression -chronic and worsening. Patient is still getting over the death of his mother and recently the separation of his girlfriend.  He says that he has been feeling more isolated, alone, and \"down in the dumps \".  Patient denies any suicidal ideations, emotional liability threats towards himself or anybody else,    2 insomnia- Patient says that he is not getting enough sleep associated with a panic attack.  Patient says that he is not been able to sleep very well because of his underlying mental health and stress.      Review of Systems   All other systems reviewed and are negative.      Objective   Physical Exam   Constitutional: He is oriented to person, place, and time. He appears well-developed and well-nourished.   HENT:   Head: Normocephalic.   Right Ear: External ear normal.   Left Ear: External ear normal.   Nose: Nose normal.   Mouth/Throat: Oropharynx is clear and moist.   Eyes: Pupils are equal, round, and reactive to light. Conjunctivae and EOM are normal.   Neck: Normal range of motion. Neck supple.   Cardiovascular: Normal rate, regular rhythm and normal heart sounds.   Pulmonary/Chest: Effort normal and breath sounds normal.   Abdominal: Soft. Bowel sounds are normal.   Musculoskeletal: Normal range of motion.   Neurological: He is alert and oriented to person, place, and time.   Skin: Skin is warm.   Nursing note and vitals reviewed.        Assessment/Plan   Tr was seen today for follow-up.    Diagnoses and all orders for this visit:    Need for influenza vaccination  -     Fluarix/Fluzone/Afluria Quad>6 Months    Depression, unspecified depression type  (New start)  -     " vilazodone (VIIBRYD) 20 MG tablet tablet; Take 1 tablet by mouth Daily.    Primary insomnia- secondary to stress and anxiety.  We will continue to address the anxiety first and this will hopefully improve the insomnia aspect.    Anticipatory guidance patient will enroll into psychology grief session and substance abuse grief.    Pharmacogenetics testing.

## 2020-02-13 ENCOUNTER — TELEPHONE (OUTPATIENT)
Dept: INTERNAL MEDICINE | Facility: CLINIC | Age: 30
End: 2020-02-13

## 2020-02-13 NOTE — TELEPHONE ENCOUNTER
Spoke with pt, advised him of provider comments. He verbalized good understanding. He scheduled for 02/20/20. He states he will attempt to get a BP cuff in the meantime.

## 2020-02-13 NOTE — TELEPHONE ENCOUNTER
Received a message.  Go ahead and tell patient to allow them (the hospital) to go ahead and address his current issue involving the abscess and in regards to his blood pressure if he has some way to monitor his blood pressure as an outpatient i.e. blood pressure monitor and cuff that would be appreciated and to make a follow-up appointment so we can discuss treatment and management

## 2020-02-13 NOTE — TELEPHONE ENCOUNTER
Pt was seen last Friday.  Pt says he went to ER and says he has a deep tissue abscess in his tonsil.  Pt says they found it because he could not swallow and painful.  Pt said he was transferred to [facility] for treatment from [facility] because they were unable to treat it.  Pt was told to inform his PCP.     Pt says the hospital is concerned about his high blood pressure and so is he.  Pt says it runs in his family.  Pt is requesting call back to discuss what needs to be done.

## 2020-02-20 ENCOUNTER — OFFICE VISIT (OUTPATIENT)
Dept: INTERNAL MEDICINE | Facility: CLINIC | Age: 30
End: 2020-02-20

## 2020-02-20 VITALS
DIASTOLIC BLOOD PRESSURE: 90 MMHG | WEIGHT: 195.38 LBS | OXYGEN SATURATION: 99 % | BODY MASS INDEX: 25.78 KG/M2 | SYSTOLIC BLOOD PRESSURE: 140 MMHG | HEART RATE: 89 BPM | RESPIRATION RATE: 20 BRPM | TEMPERATURE: 98 F

## 2020-02-20 DIAGNOSIS — F10.930 ALCOHOL WITHDRAWAL SYNDROME WITHOUT COMPLICATION (HCC): ICD-10-CM

## 2020-02-20 DIAGNOSIS — I10 ESSENTIAL HYPERTENSION: Primary | ICD-10-CM

## 2020-02-20 DIAGNOSIS — F32.A DEPRESSION, UNSPECIFIED DEPRESSION TYPE: ICD-10-CM

## 2020-02-20 DIAGNOSIS — Z71.41 ALCOHOL ABUSE COUNSELING AND SURVEILLANCE OF ALCOHOLIC: ICD-10-CM

## 2020-02-20 PROCEDURE — 99214 OFFICE O/P EST MOD 30 MIN: CPT | Performed by: INTERNAL MEDICINE

## 2020-02-20 RX ORDER — CARVEDILOL 6.25 MG/1
6.25 TABLET ORAL 2 TIMES DAILY WITH MEALS
Qty: 60 TABLET | Refills: 3 | Status: SHIPPED | OUTPATIENT
Start: 2020-02-20 | End: 2021-01-25 | Stop reason: SDUPTHER

## 2020-02-20 NOTE — PROGRESS NOTES
Subjective   Tr Brito is a 29 y.o. male.     History of Present Illness     The following portions of the patient's history were reviewed and updated as appropriate: allergies, current medications, past family history, past medical history, past social history, past surgical history and problem list.    1.Elevated blood pressure readings-patient says that his blood pressure has been elevated over the past several weeks.  He has been under a lot of stress lately still dealing with the death of his mother and also been having heavy to moderate alcohol consumption.  His blood pressure readings have been anywhere systolic 150, 160 patient denies any symptoms of any shortness of breath, chest pain, nausea, vomiting, headache, fatigue, or any other systemic signs.    2 hand numbness and lower leg numbness  Duration 1 to 2 months  Sx patient has been having intermittent episodes of hand numbness and tingling and this has affected his overall writing and dexterity and also numbness and tingling in the lower extremities.  Symptoms are made worse during the time when he has the urge to consume alcohol and totally relieved when alcohol is consumed.    3 depression-chronic and fair.  Patient has not started the Viibryd yet as he has been dealing with mild illness.  He denies any suicidal ideations emotional liability test results of anybody is or hallucinations.      Social History drinking- patient is drinking more alcohol. Consumes: half 1/5 of burbon         Review of Systems   All other systems reviewed and are negative.      Objective   Physical Exam   Constitutional: He appears well-developed and well-nourished.   HENT:   Head: Normocephalic.   Right Ear: External ear normal.   Left Ear: External ear normal.   Nose: Nose normal.   Mouth/Throat: Oropharynx is clear and moist.   Eyes: Pupils are equal, round, and reactive to light. Conjunctivae and EOM are normal.   Neck: Normal range of motion. Neck supple.    Cardiovascular: Normal rate, regular rhythm and normal heart sounds.   Pulmonary/Chest: Effort normal and breath sounds normal.   Musculoskeletal: Normal range of motion.   Neurological: He is alert.   Skin: Skin is warm.   Psychiatric: He has a normal mood and affect. His behavior is normal. Judgment and thought content normal.   Nursing note and vitals reviewed.        Assessment/Plan   Tr was seen today for hypertension.    Diagnoses and all orders for this visit:    Essential hypertension- patient will be started on carvedilol for the treatment of hypertension.  Patient is been instructed to continue with blood pressure monitoring    Alcohol abuse counseling and surveillance of alcoholic- patient has been given contact numbers for referral basis to a outpatient addiction treatment.  Patient will choose 1 of the facilities on the list and report to me that he has followed through    Alcohol withdrawal syndrome without complication (CMS/[facility])    Depression, unspecified depression type-continue on current medications encourage Viibryd.  Careful with SSRIs and alcohol consumption as intoxication can be worsened.    Other orders  -     carvedilol (COREG) 6.25 MG tablet; Take 1 tablet by mouth 2 (Two) Times a Day With Meals.

## 2020-04-01 ENCOUNTER — TELEPHONE (OUTPATIENT)
Dept: INTERNAL MEDICINE | Facility: CLINIC | Age: 30
End: 2020-04-01

## 2020-04-01 NOTE — TELEPHONE ENCOUNTER
Has anybody tested positive for COVID 19?  If not and fellow employee's quarantine is just based on presumed cases he should be fine not to quarantine yet.   In other words I would  not quarantine self until he starts showing symptoms and or possibly been exposed to a confirmed COVID19

## 2020-04-01 NOTE — TELEPHONE ENCOUNTER
Pt wants to know if he needs to be self quarantined due to exposure at his employment. Pt works at [employer]. 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 One of his employees just got quarantined today and is showing symptoms. Pt lives with his roommate and his mother. The roommates mother has COPD and respiratory failure. The patient and his roommate are concerned about patients exposure at work and possibly bringing something home to the sick mother. Pt would like to discuss this matter in detail with Dr. Hurtado.

## 2020-04-02 NOTE — TELEPHONE ENCOUNTER
Spoke to patient and he stated that he is not worried about him he is worried about his room mates mom she has copd and respiratory issues. He is afraid of making her sick. I advised patient to make sure that he continues to keep the 6 feet distance between her and him. Wash hands and surfaces at home that is touched regularly. I also told him if he is out in the public during the day it wouldn't be a bad idea to keep his shoes in the garage or outside of the home. Also can take a shower right when he gets home just ion case. Patient stated that he doesn't have any symptoms and verbalized good understanding.

## 2020-07-24 ENCOUNTER — TELEPHONE (OUTPATIENT)
Dept: INTERNAL MEDICINE | Facility: CLINIC | Age: 30
End: 2020-07-24

## 2020-07-24 NOTE — TELEPHONE ENCOUNTER
Patient stated since yesterday he has had pain under his jaw in lymph nodes. He stated because of his medical history and complications he has had in the past in the same area he would like to know what Dr. Hurtado thinks he should do. Requesting a call back ASAP if possible.     Please call patient and advise [phone].

## 2020-07-24 NOTE — TELEPHONE ENCOUNTER
At this point I would recommend that he go to the ER or urgent treatment center based on his current symptoms and if he cannot wait until getting in to the clinic next week

## 2021-01-25 ENCOUNTER — OFFICE VISIT (OUTPATIENT)
Dept: INTERNAL MEDICINE | Facility: CLINIC | Age: 31
End: 2021-01-25

## 2021-01-25 VITALS
OXYGEN SATURATION: 99 % | DIASTOLIC BLOOD PRESSURE: 84 MMHG | HEART RATE: 72 BPM | BODY MASS INDEX: 25.46 KG/M2 | TEMPERATURE: 96.4 F | RESPIRATION RATE: 20 BRPM | SYSTOLIC BLOOD PRESSURE: 128 MMHG | HEIGHT: 73 IN | WEIGHT: 192.13 LBS

## 2021-01-25 DIAGNOSIS — F32.A DEPRESSION, UNSPECIFIED DEPRESSION TYPE: ICD-10-CM

## 2021-01-25 DIAGNOSIS — Z23 NEED FOR INFLUENZA VACCINATION: Primary | ICD-10-CM

## 2021-01-25 DIAGNOSIS — F41.9 ANXIETY: ICD-10-CM

## 2021-01-25 DIAGNOSIS — T70.20XA EFFECTS OF HIGH ALTITUDE, INITIAL ENCOUNTER: ICD-10-CM

## 2021-01-25 DIAGNOSIS — I10 ESSENTIAL HYPERTENSION: ICD-10-CM

## 2021-01-25 PROCEDURE — 90471 IMMUNIZATION ADMIN: CPT | Performed by: INTERNAL MEDICINE

## 2021-01-25 PROCEDURE — 90686 IIV4 VACC NO PRSV 0.5 ML IM: CPT | Performed by: INTERNAL MEDICINE

## 2021-01-25 PROCEDURE — 99214 OFFICE O/P EST MOD 30 MIN: CPT | Performed by: INTERNAL MEDICINE

## 2021-01-25 RX ORDER — ACETAZOLAMIDE 125 MG/1
125 TABLET ORAL 2 TIMES DAILY
Qty: 20 TABLET | Refills: 0 | Status: SHIPPED | OUTPATIENT
Start: 2021-01-25 | End: 2022-09-20

## 2021-01-25 RX ORDER — HYDROXYZINE 50 MG/1
50 TABLET, FILM COATED ORAL 3 TIMES DAILY PRN
Qty: 50 TABLET | Refills: 2 | Status: SHIPPED | OUTPATIENT
Start: 2021-01-25 | End: 2022-09-20

## 2021-01-25 RX ORDER — CARVEDILOL 6.25 MG/1
6.25 TABLET ORAL 2 TIMES DAILY WITH MEALS
Qty: 180 TABLET | Refills: 3 | Status: SHIPPED | OUTPATIENT
Start: 2021-01-25 | End: 2022-09-20

## 2021-01-25 NOTE — PROGRESS NOTES
Subjective   Tr Brito is a 30 y.o. male.     History of Present Illness     The following portions of the patient's history were reviewed and updated as appropriate: allergies, current medications, past family history, past medical history, past social history, past surgical history and problem list.    1 anxiety/depression-chronic.  Patient says that he would like to start on the medication after reviewing his pharmacogenetics testing.  Patient currently says that he feels that his depression and anxiety is returning and he has been coping the best that he can since the passing of his mother.  Patient denies any suicidal ideations, emotional liability that has concerns of anybody else, or any other concerns this time.    2 HTN- chronic.  Patient continues on the carvedilol and this medication has been doing very well with control of his blood pressure.  Patient denies any side effects on medication he continues with a low-sodium diet.    3motion sickness ( new issue)-patient says that he does tend to get altitude sickness and he would like to be on a medication that will help control his symptoms or prevent them from happening in the event that he goes to high altitudes in [place].    Review of Systems   All other systems reviewed and are negative.      Objective   Physical Exam  Vitals signs and nursing note reviewed.   HENT:      Head: Normocephalic and atraumatic.      Nose: Nose normal.      Mouth/Throat:      Mouth: Mucous membranes are moist.   Eyes:      Extraocular Movements: Extraocular movements intact.      Pupils: Pupils are equal, round, and reactive to light.   Neck:      Musculoskeletal: Normal range of motion and neck supple.   Cardiovascular:      Rate and Rhythm: Normal rate and regular rhythm.      Pulses: Normal pulses.      Heart sounds: Normal heart sounds.   Pulmonary:      Effort: Pulmonary effort is normal.      Breath sounds: Normal breath sounds.   Abdominal:      General:  Bowel sounds are normal.      Palpations: Abdomen is soft.   Skin:     General: Skin is warm.      Capillary Refill: Capillary refill takes less than 2 seconds.   Neurological:      General: No focal deficit present.      Mental Status: He is alert.   Psychiatric:         Mood and Affect: Mood normal.         Behavior: Behavior normal.           Assessment/Plan   Diagnoses and all orders for this visit:    1. Need for influenza vaccination (Primary)  -     Fluarix/Fluzone/Afluria Quad>6 Months    2. Anxiety  -     hydrOXYzine (ATARAX) 50 MG tablet; Take 1 tablet by mouth 3 (Three) Times a Day As Needed for Itching.  Dispense: 50 tablet; Refill: 2  -     sertraline (Zoloft) 50 MG tablet; Take 1 tablet by mouth Daily.  Dispense: 90 tablet; Refill: 0    3. Essential hypertension  -     carvedilol (COREG) 6.25 MG tablet; Take 1 tablet by mouth 2 (Two) Times a Day With Meals.  Dispense: 180 tablet; Refill: 3    4. Depression, unspecified depression type  -     sertraline (Zoloft) 50 MG tablet; Take 1 tablet by mouth Daily.  Dispense: 90 tablet; Refill: 0    Side effects and precautions of the new medication(s) have been discussed with patient  I have answered patients questions thoroughly to their understanding.  If patient should experience any side effects from the medication, a follow up appointment should be made.      5. Effects of high altitude, initial encounter  -     acetaZOLAMIDE (DIAMOX) 125 MG tablet; Take 1 tablet by mouth 2 (Two) Times a Day.  Dispense: 20 tablet; Refill: 0  Side effects and precautions of the new medication(s) have been discussed with patient  I have answered patients questions thoroughly to their understanding.  If patient should experience any side effects from the medication, a follow up appointment should be made.

## 2021-03-09 ENCOUNTER — OFFICE VISIT (OUTPATIENT)
Dept: INTERNAL MEDICINE | Facility: CLINIC | Age: 31
End: 2021-03-09

## 2021-03-09 VITALS
SYSTOLIC BLOOD PRESSURE: 122 MMHG | TEMPERATURE: 97.3 F | BODY MASS INDEX: 24.67 KG/M2 | WEIGHT: 187 LBS | HEART RATE: 68 BPM | DIASTOLIC BLOOD PRESSURE: 66 MMHG | RESPIRATION RATE: 17 BRPM

## 2021-03-09 DIAGNOSIS — F41.9 ANXIETY: Primary | ICD-10-CM

## 2021-03-09 DIAGNOSIS — N52.9 ERECTILE DYSFUNCTION, UNSPECIFIED ERECTILE DYSFUNCTION TYPE: ICD-10-CM

## 2021-03-09 DIAGNOSIS — F32.A DEPRESSION, UNSPECIFIED DEPRESSION TYPE: ICD-10-CM

## 2021-03-09 PROCEDURE — 99213 OFFICE O/P EST LOW 20 MIN: CPT | Performed by: INTERNAL MEDICINE

## 2021-03-09 RX ORDER — SILDENAFIL 100 MG/1
100 TABLET, FILM COATED ORAL DAILY PRN
Qty: 3 TABLET | Refills: 5 | Status: SHIPPED | OUTPATIENT
Start: 2021-03-09 | End: 2021-07-27 | Stop reason: SDUPTHER

## 2021-03-09 NOTE — PROGRESS NOTES
Subjective   Tr Brito is a 30 y.o. male.     History of Present Illness     The following portions of the patient's history were reviewed and updated as appropriate: allergies, current medications, past family history, past medical history, past social history, past surgical history and problem list.    1 depression-chronic and stable.  Patient says that he is doing fairly well with his current medications and has had no recent exacerbations of his depression.  Patient denies any suicidal ideations, emotional liability threats towards himself or anybody else, or any other active concerns this time.    2 erectile dysfunction-new issue, patient says that he has noticed that his erectile dysfunction has somewhat returned.  He is not sure if it is physical or psychological.  Patient says that he has really never tried    Review of Systems   All other systems reviewed and are negative.      Objective   Physical Exam  Vitals and nursing note reviewed.   Constitutional:       Appearance: Normal appearance.   HENT:      Head: Normocephalic and atraumatic.      Nose: Nose normal.      Mouth/Throat:      Mouth: Mucous membranes are moist.   Eyes:      Extraocular Movements: Extraocular movements intact.   Cardiovascular:      Rate and Rhythm: Normal rate.      Pulses: Normal pulses.   Pulmonary:      Effort: Pulmonary effort is normal.      Breath sounds: Normal breath sounds.   Musculoskeletal:      Cervical back: Normal range of motion and neck supple.   Skin:     General: Skin is warm.      Capillary Refill: Capillary refill takes less than 2 seconds.   Neurological:      General: No focal deficit present.      Mental Status: He is alert.   Psychiatric:         Mood and Affect: Mood normal.         Behavior: Behavior normal.         Thought Content: Thought content normal.         Judgment: Judgment normal.           Assessment/Plan   Diagnoses and all orders for this visit:    1. Anxiety (Primary)-continue on  current medications.    2. Depression, unspecified depression type-chronic and controlled continue on current antidepressants.    3. Erectile dysfunction, unspecified erectile dysfunction type  -     sildenafil (Viagra) 100 MG tablet; Take 1 tablet by mouth Daily As Needed for Erectile Dysfunction.  Dispense: 3 tablet; Refill: 5

## 2021-03-30 ENCOUNTER — TELEPHONE (OUTPATIENT)
Dept: INTERNAL MEDICINE | Facility: CLINIC | Age: 31
End: 2021-03-30

## 2021-03-30 DIAGNOSIS — F41.9 ANXIETY: ICD-10-CM

## 2021-04-05 RX ORDER — BUPROPION HYDROCHLORIDE 150 MG/1
150 TABLET ORAL DAILY
Qty: 30 TABLET | Refills: 3 | Status: SHIPPED | OUTPATIENT
Start: 2021-04-05 | End: 2022-09-20

## 2021-04-07 NOTE — TELEPHONE ENCOUNTER
Left vm for patient to return call, office number given.     Also sent patient [email or web address] message with this information.

## 2021-04-08 NOTE — TELEPHONE ENCOUNTER
Called and spoke with patient and advised Wellbutrin 150mg 1 daily has been sent to the pharmacy and for him to stop the sertraline. Patient verbalized understanding no further questions

## 2021-07-27 ENCOUNTER — TELEPHONE (OUTPATIENT)
Dept: INTERNAL MEDICINE | Facility: CLINIC | Age: 31
End: 2021-07-27

## 2021-07-27 DIAGNOSIS — N52.9 ERECTILE DYSFUNCTION, UNSPECIFIED ERECTILE DYSFUNCTION TYPE: ICD-10-CM

## 2021-08-02 NOTE — TELEPHONE ENCOUNTER
LMOM for patient to call    HUB please inform patient    Patient is due for a physical.  Please advise patient he needs to schedule and keep his appointment to continue to receive refills in the future.  If he is unable to keep his appointment we will not be able to provider further refills.  Once appointment has been scheduled please update message with date and time so we can process the request.  We will forward the message to the provider to review the refill request.

## 2021-08-04 RX ORDER — SILDENAFIL 100 MG/1
100 TABLET, FILM COATED ORAL DAILY PRN
Qty: 3 TABLET | Refills: 5 | Status: SHIPPED | OUTPATIENT
Start: 2021-08-04 | End: 2021-08-19 | Stop reason: SDUPTHER

## 2021-08-04 NOTE — TELEPHONE ENCOUNTER
Tried to reach patient again no answer and no call back. Patient has been also sent a [employer] message that he needs to call the office to be scheduled for a follow up appointment.

## 2021-08-18 NOTE — TELEPHONE ENCOUNTER
Patient called and has been scheduled for a med refill follow up in office on sept. 9. Patient asked for us to call in his RX.  Please advise?

## 2021-08-19 RX ORDER — SILDENAFIL 100 MG/1
100 TABLET, FILM COATED ORAL DAILY PRN
Qty: 4 TABLET | Refills: 5 | Status: SHIPPED | OUTPATIENT
Start: 2021-08-19 | End: 2022-04-29 | Stop reason: SDUPTHER

## 2021-09-09 ENCOUNTER — TELEPHONE (OUTPATIENT)
Dept: INTERNAL MEDICINE | Facility: CLINIC | Age: 31
End: 2021-09-09

## 2021-09-09 NOTE — TELEPHONE ENCOUNTER
Caller: Tr Brito    Relationship to patient: Self    Best call back number: [phone]    Chief complaint: MED REFILL    Type of visit:OFFICE VISIT     Requested date: ASAP    If rescheduling, when is the original appointment: 09-09-21    Additional notes: PATIENT STATED THAT HE GOT OUT OF WORK LATE TODAY AND CAN NOT MAKE IT TO APPOINTMENT AND NEED TO BE RESCHEDULED BEFORE October

## 2022-04-06 ENCOUNTER — LAB (OUTPATIENT)
Dept: LAB | Facility: HOSPITAL | Age: 32
End: 2022-04-06

## 2022-04-06 ENCOUNTER — OFFICE VISIT (OUTPATIENT)
Dept: INTERNAL MEDICINE | Facility: CLINIC | Age: 32
End: 2022-04-06

## 2022-04-06 VITALS
BODY MASS INDEX: 24.41 KG/M2 | SYSTOLIC BLOOD PRESSURE: 140 MMHG | HEART RATE: 70 BPM | OXYGEN SATURATION: 97 % | WEIGHT: 185 LBS | RESPIRATION RATE: 20 BRPM | TEMPERATURE: 98.7 F | DIASTOLIC BLOOD PRESSURE: 80 MMHG

## 2022-04-06 DIAGNOSIS — F10.20 ALCOHOLISM: ICD-10-CM

## 2022-04-06 DIAGNOSIS — Z13.220 LIPID SCREENING: ICD-10-CM

## 2022-04-06 DIAGNOSIS — F32.A DEPRESSION, UNSPECIFIED DEPRESSION TYPE: ICD-10-CM

## 2022-04-06 DIAGNOSIS — F41.9 ANXIETY: Primary | ICD-10-CM

## 2022-04-06 DIAGNOSIS — Z71.41 ENCOUNTER FOR ALCOHOLISM COUNSELING: ICD-10-CM

## 2022-04-06 DIAGNOSIS — F10.930 ALCOHOL WITHDRAWAL SYNDROME WITHOUT COMPLICATION: ICD-10-CM

## 2022-04-06 LAB
ALBUMIN SERPL-MCNC: 4.8 G/DL (ref 3.5–5.2)
ALBUMIN/GLOB SERPL: 1.9 G/DL
ALP SERPL-CCNC: 54 U/L (ref 39–117)
ALT SERPL W P-5'-P-CCNC: 21 U/L (ref 1–41)
ANION GAP SERPL CALCULATED.3IONS-SCNC: 10.5 MMOL/L (ref 5–15)
AST SERPL-CCNC: 25 U/L (ref 1–40)
BILIRUB SERPL-MCNC: 0.6 MG/DL (ref 0–1.2)
BUN SERPL-MCNC: 10 MG/DL (ref 6–20)
BUN/CREAT SERPL: 12.3 (ref 7–25)
CALCIUM SPEC-SCNC: 9.5 MG/DL (ref 8.6–10.5)
CHLORIDE SERPL-SCNC: 103 MMOL/L (ref 98–107)
CHOLEST SERPL-MCNC: 174 MG/DL (ref 0–200)
CO2 SERPL-SCNC: 26.5 MMOL/L (ref 22–29)
CREAT SERPL-MCNC: 0.81 MG/DL (ref 0.76–1.27)
DEPRECATED RDW RBC AUTO: 43.9 FL (ref 37–54)
EGFRCR SERPLBLD CKD-EPI 2021: 121.6 ML/MIN/1.73
ERYTHROCYTE [DISTWIDTH] IN BLOOD BY AUTOMATED COUNT: 12.7 % (ref 12.3–15.4)
GLOBULIN UR ELPH-MCNC: 2.5 GM/DL
GLUCOSE SERPL-MCNC: 92 MG/DL (ref 65–99)
HCT VFR BLD AUTO: 43.4 % (ref 37.5–51)
HDLC SERPL-MCNC: 61 MG/DL (ref 40–60)
HGB BLD-MCNC: 14.4 G/DL (ref 13–17.7)
LDLC SERPL CALC-MCNC: 103 MG/DL (ref 0–100)
LDLC/HDLC SERPL: 1.69 {RATIO}
MCH RBC QN AUTO: 31.5 PG (ref 26.6–33)
MCHC RBC AUTO-ENTMCNC: 33.2 G/DL (ref 31.5–35.7)
MCV RBC AUTO: 95 FL (ref 79–97)
PLATELET # BLD AUTO: 251 10*3/MM3 (ref 140–450)
PMV BLD AUTO: 10.6 FL (ref 6–12)
POTASSIUM SERPL-SCNC: 4.5 MMOL/L (ref 3.5–5.2)
PROT SERPL-MCNC: 7.3 G/DL (ref 6–8.5)
RBC # BLD AUTO: 4.57 10*6/MM3 (ref 4.14–5.8)
SODIUM SERPL-SCNC: 140 MMOL/L (ref 136–145)
T4 FREE SERPL-MCNC: 1.29 NG/DL (ref 0.93–1.7)
TRIGL SERPL-MCNC: 51 MG/DL (ref 0–150)
TSH SERPL DL<=0.05 MIU/L-ACNC: 0.46 UIU/ML (ref 0.27–4.2)
VLDLC SERPL-MCNC: 10 MG/DL (ref 5–40)
WBC NRBC COR # BLD: 4.49 10*3/MM3 (ref 3.4–10.8)

## 2022-04-06 PROCEDURE — 80050 GENERAL HEALTH PANEL: CPT | Performed by: INTERNAL MEDICINE

## 2022-04-06 PROCEDURE — 99214 OFFICE O/P EST MOD 30 MIN: CPT | Performed by: INTERNAL MEDICINE

## 2022-04-06 PROCEDURE — 80061 LIPID PANEL: CPT | Performed by: INTERNAL MEDICINE

## 2022-04-06 PROCEDURE — 84439 ASSAY OF FREE THYROXINE: CPT | Performed by: INTERNAL MEDICINE

## 2022-04-06 RX ORDER — CLONIDINE HYDROCHLORIDE 0.1 MG/1
TABLET ORAL
Qty: 45 TABLET | Refills: 1 | Status: SHIPPED | OUTPATIENT
Start: 2022-04-06 | End: 2022-09-20

## 2022-04-06 RX ORDER — BUPROPION HYDROCHLORIDE 150 MG/1
150 TABLET ORAL DAILY
Qty: 90 TABLET | Refills: 1 | Status: SHIPPED | OUTPATIENT
Start: 2022-04-06 | End: 2022-09-20

## 2022-04-06 NOTE — PROGRESS NOTES
Chief Complaint  Anxiety    Subjective    Tr Brito is a 32 y.o. male.     Tr Brito presents to [facility] for       History of Present Illness    Mr. Brito presents today for anxiety follow-up plus any other medical concerns.    1. Anxiety- The patient states that he feels like he is killing himself. He reports that he has been drinking and he feels like it is affecting his body. He states that 6 weeks ago the muscles in his legs feel really tight as well as all his muscles. He states that he has a physical job and it seems like his muscles get tired quicker. He reports that he is depressed and anxious. He denies any concerns of suicide or harming someone else.    2. Alcoholism- The patient reports that he likes to drink bourbon. He states that he drinks more then he should. He denies that he wakes up in the morning and drinks. He reports that when he comes home from work it is too much. He reports that his mother passed away. He reports that he broke up with his girlfriend immediately after his mothers death. He states that he does not like to drink. He states that he has not been through substance abuse counseling or alcohol anonymous. Although he has not sought out these options he is not opposed to doing something like that.      The following portions of the patient's history were reviewed and updated as appropriate: allergies, current medications, past family history, past medical history, past social history, past surgical history and problem list.    Review of Systems   All other systems reviewed and are negative.      Objective   Vital Signs:   /80 (BP Location: Right arm, Patient Position: Sitting, Cuff Size: Adult)   Pulse 70   Temp 98.7 °F (37.1 °C) (Temporal)   Resp 20   Wt 83.9 kg (185 lb)   SpO2 97%   BMI 24.41 kg/m²     Body mass index is 24.41 kg/m².    Physical Exam     The patient is alert x3, non-distressed.      HEENT: Head was normocephalic, atraumatic. Pupils equal to light and accommodation. Extraocular muscles intact. Moist membranes.   Chest: Clear to auscultation, rhonchi, or wheeze.  Cardiac: S1, S2, no murmurs, rubs, or gallop.        Assessment and Plan  Diagnoses and all orders for this visit:    1. Concerns of anxiety/mild depression and even mild grief.   - We did have a nice thorough productive discussion here today with Tr and concerns of mental health and going ahead and starting a treatment that would help get his anxiety and and mild depression under control here.  - I did discuss with Tr my considerations that he has not had time to grief with the passing of his mom and I would like him to see counseling grief counseling as well as counseling for his anxiety and depression. The patient has agreed with that and so I will go ahead and put that in the referral.   - I am also going to make a switch with his medications. 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 The drug of choice will be Wellbutrin 150 mg, 1 tablet by mouth 1 time per day. The side effects and precautions have been discussed with patient.    2. Alcoholism and alcohol usage   - I am going to get patient referred for alcohol counseling. The patient did have concerns of possible withdrawal symptoms as he may be experiencing this with a cessation of alcohol, so one treatment approach would be to give him clonidine as needed for just in case he does have mouth alcohol withdrawal symptoms. Clonidine 0.1 mg 1 tablet by mouth 1 time per day as only as needed.  - In conjunction with concerns of alcohol withdrawal symptoms, I did discuss side effects and precautions with patient and again this is just as needed for agitation.     Labs that we will do today are CBC, CMP, TSH, free T4, and lipid profile will be done as well.    The  Patient will follow up with me in clinic in approximately 4 weeks for reassessment or or earlier.       Transcribed from ambient dictation for Jorje Hurtado MD by Kirsten Quintero.  04/06/22   18:34 EDT    Patient verbalized consent to the visit recording.  I have personally performed the services described in this document as transcribed by the above individual, and it is both accurate and complete.  Jorje Hurtado MD  4/11/2022  00:34 EDT

## 2022-04-14 DIAGNOSIS — F10.10 ALCOHOL ABUSE: Primary | ICD-10-CM

## 2022-04-29 DIAGNOSIS — N52.9 ERECTILE DYSFUNCTION, UNSPECIFIED ERECTILE DYSFUNCTION TYPE: ICD-10-CM

## 2022-04-29 RX ORDER — SILDENAFIL 100 MG/1
100 TABLET, FILM COATED ORAL DAILY PRN
Qty: 4 TABLET | Refills: 5 | Status: SHIPPED | OUTPATIENT
Start: 2022-04-29 | End: 2022-08-19 | Stop reason: SDUPTHER

## 2022-04-29 NOTE — TELEPHONE ENCOUNTER
Caller: Tr Brito    Relationship: Self    Best call back number:[phone]    Requested Prescriptions:   Requested Prescriptions     Pending Prescriptions Disp Refills   • sildenafil (Viagra) 100 MG tablet 4 tablet 5     Sig: Take 1 tablet by mouth Daily As Needed for Erectile Dysfunction.        Pharmacy where request should be sent:      [facility] [address] [facility] [phone] [facility] [phone] [facility]        Additional details provided by patient:    Does the patient have less than a 3 day supply:  [x] Yes  [] No    Les Wu Rep   04/29/22 14:54 EDT     PLEASE CALL IF AND WHEN REFILL IS CALLED IN

## 2022-08-15 NOTE — TELEPHONE ENCOUNTER
Please tell them that the MRI shows a disc protrusion at the L5/S1 region which obviously is the cause of his lower back pain.    Treatment options at this time would be the following 1 chronic pain management i.e. pain clinic    2 referral to neurosurgery for review of surgical options.   - - -

## 2022-08-19 ENCOUNTER — TELEPHONE (OUTPATIENT)
Dept: INTERNAL MEDICINE | Facility: CLINIC | Age: 32
End: 2022-08-19

## 2022-08-19 DIAGNOSIS — N52.9 ERECTILE DYSFUNCTION, UNSPECIFIED ERECTILE DYSFUNCTION TYPE: ICD-10-CM

## 2022-08-19 RX ORDER — SILDENAFIL 100 MG/1
100 TABLET, FILM COATED ORAL DAILY PRN
Qty: 4 TABLET | Refills: 5 | Status: SHIPPED | OUTPATIENT
Start: 2022-08-19 | End: 2022-09-20 | Stop reason: SDUPTHER

## 2022-08-19 NOTE — TELEPHONE ENCOUNTER
Caller: Tr Brito    Relationship: Self    Best call back number: [phone]    Requested Prescriptions:   sildenafil (Viagra) 100 MG tablet    Pharmacy where request should be sent: [facility] [address] [place] - [phone] [facility] [phone]      Additional details provided by patient: PLEASE REFILL. PATIENT HAS BEEN SCHEDULED FOR A MEDICATION REFILL ON 9.20.22.     PLEASE CALL IF THIS PRESCRIPTION HAS BEEN PLACED AND TO LET HIM KNOW IF HE NEEDS TO KEEP THE 9.20.22 APPOINTMENT OR NOT.    Les Mandel Rep   08/19/22 16:04 EDT     THANK YOU.

## 2022-09-20 ENCOUNTER — OFFICE VISIT (OUTPATIENT)
Dept: INTERNAL MEDICINE | Facility: CLINIC | Age: 32
End: 2022-09-20

## 2022-09-20 VITALS
RESPIRATION RATE: 20 BRPM | DIASTOLIC BLOOD PRESSURE: 84 MMHG | WEIGHT: 178 LBS | TEMPERATURE: 98.4 F | HEIGHT: 73 IN | HEART RATE: 78 BPM | BODY MASS INDEX: 23.59 KG/M2 | OXYGEN SATURATION: 99 % | SYSTOLIC BLOOD PRESSURE: 128 MMHG

## 2022-09-20 DIAGNOSIS — N52.9 ERECTILE DYSFUNCTION, UNSPECIFIED ERECTILE DYSFUNCTION TYPE: ICD-10-CM

## 2022-09-20 DIAGNOSIS — F41.9 ANXIETY: Primary | ICD-10-CM

## 2022-09-20 PROCEDURE — 99213 OFFICE O/P EST LOW 20 MIN: CPT | Performed by: INTERNAL MEDICINE

## 2022-09-20 RX ORDER — SILDENAFIL 100 MG/1
100 TABLET, FILM COATED ORAL DAILY PRN
Qty: 30 TABLET | Refills: 3 | Status: SHIPPED | OUTPATIENT
Start: 2022-09-20

## 2022-09-20 NOTE — PROGRESS NOTES
"Chief Complaint  Anxiety (Refill follow up)    Subjective    Tr Brito is a 32 y.o. male.     rT Brito presents to [facility] for       History of Present Illness    1. Anxiety. - The patient reports that he is doing very well in regards to his anxiety. He denies taking any medication at this time. He is doing very well in his new position and working at his current job and employment. He denies any worsening anxiety or panic attacks.    2. Erectile dysfunction. - The patient reports that he needs a refill on his Viagra. He denies taking Wellbutrin or clonidine.    The following portions of the patient's history were reviewed and updated as appropriate: allergies, current medications, past family history, past medical history, past social history, past surgical history and problem list.    Review of Systems  A review of systems was performed, and the pertinent positives are noted in the HPI.    Objective   Vital Signs:   /84 (BP Location: Right arm, Patient Position: Sitting, Cuff Size: Adult)   Pulse 78   Temp 98.4 °F (36.9 °C) (Temporal)   Resp 20   Ht 185.4 cm (73\")   Wt 80.7 kg (178 lb)   SpO2 99%   BMI 23.48 kg/m²     Body mass index is 23.48 kg/m².    Physical Exam  Constitutional:       General: He is not in acute distress.  HENT:      Head: Normocephalic and atraumatic.   Eyes:      Extraocular Movements: Extraocular movements intact.      Pupils: Pupils are equal, round, and reactive to light.   Cardiovascular:      Heart sounds: S1 normal and S2 normal. No murmur heard.    No friction rub. No gallop.   Pulmonary:      Breath sounds: Normal breath sounds. No wheezing or rhonchi.   Neurological:      Mental Status: He is alert and oriented to person, place, and time.               Assessment and Plan  Diagnoses and all orders for this visit:    1. Anxiety.  - Patient is doing very well in regards to his anxiety. No " medication at this time. He is doing very well in his new position and working at his current job and employment, so no reports of any worsening anxiety, and panic attacks.    2. Erectile dysfunction.  - Patient refilled Viagra, which has done very well for his erectile dysfunction, so we will continue him on that medication.    Patient is due for his annual physical, which I instructed him to make a schedule at his earliest convenience. I will see him just as needed here and then obviously anticipate his schedule for his complete physical.        Follow Up   No follow-ups on file.  Patient was given instructions and counseling regarding his condition or for health maintenance advice. Please see specific information pulled into the AVS if appropriate.      Transcribed from ambient dictation for Jorje Hurtado MD by Elena Chávez.  09/20/22   17:18 EDT    Patient verbalized consent to the visit recording.  I have personally performed the services described in this document as transcribed by the above individual, and it is both accurate and complete.

## 2022-10-21 ENCOUNTER — TELEPHONE (OUTPATIENT)
Dept: INTERNAL MEDICINE | Facility: CLINIC | Age: 32
End: 2022-10-21

## 2022-10-21 NOTE — TELEPHONE ENCOUNTER
Caller: Tr Brito    Relationship: Self    Best call back number: [phone]    What is the best time to reach you: ANYTIME    Who are you requesting to speak with (clinical staff, provider,  specific staff member): CLINICAL STAFF OR PROVIDER    Do you know the name of the person who called: SELF    What was the call regarding: PATIENT STATES THAT HE WOULD LIKE A CALL ABOUT THE ABSCESS IN HIS TONSILS. PATIENT STATES THAT IT IS A 10 OUT OF 10 PAIN.    Do you require a callback: YES

## 2022-10-21 NOTE — TELEPHONE ENCOUNTER
Spoke to patient he stated that he has had an abscess in his throat before and he said it feels the same. He said his throat is killing him and he is having some ear pian as well. No fever. I advised patient that he would need to go to ER to be evaluated but I would talk to the provider and see if it was something that he could see him on. Patient verb good understanding. Waiting on Provider to come out of a room and will ask him.     Implemented All Universal Safety Interventions:  [place] to call system. Call bell, personal items and telephone within reach. Instruct patient to call for assistance. Room bathroom lighting operational. Non-slip footwear when patient is off stretcher. Physically safe environment: no spills, clutter or unnecessary equipment. Stretcher in lowest position, wheels locked, appropriate side rails in place.

## 2022-10-21 NOTE — TELEPHONE ENCOUNTER
Tried to reach patient no answer left voicemail to return call. I also sent patient a [employer] message.

## 2022-12-27 ENCOUNTER — TELEPHONE (OUTPATIENT)
Dept: INTERNAL MEDICINE | Facility: CLINIC | Age: 32
End: 2022-12-27

## 2022-12-27 NOTE — TELEPHONE ENCOUNTER
Caller: Tr Brito    Relationship: Self    Best call back number: [phone]    What medication are you requesting: SOMETHING FOR SYMPTOMS    What are your current symptoms: SORE THROAT-SWOLLEN LYMPHNODES-EAR ACHE    How long have you been experiencing symptoms: TODAY    Have you had these symptoms before:    [x] Yes  [] No    Have you been treated for these symptoms before:   [x] Yes  [] No    If a prescription is needed, what is your preferred pharmacy and phone number: [facility] [id number] [address] & [facility] [phone] [facility] [phone]      Additional notes: PATIENT WOULD LIKE TO KNOW IF THE DOCTOR CAN CALL SOMETHING IN OR IF HE NEEDS AN APPOINTMENT.

## 2022-12-27 NOTE — TELEPHONE ENCOUNTER
Spoke with pt and gave him covering providers message. Verbalized good understanding he would call and schedule an apt to be evaluated.

## 2022-12-28 ENCOUNTER — OFFICE VISIT (OUTPATIENT)
Dept: INTERNAL MEDICINE | Facility: CLINIC | Age: 32
End: 2022-12-28
Payer: COMMERCIAL

## 2022-12-28 VITALS
DIASTOLIC BLOOD PRESSURE: 70 MMHG | TEMPERATURE: 98.6 F | SYSTOLIC BLOOD PRESSURE: 124 MMHG | RESPIRATION RATE: 20 BRPM | HEART RATE: 76 BPM | OXYGEN SATURATION: 98 % | BODY MASS INDEX: 24.44 KG/M2 | WEIGHT: 185.25 LBS

## 2022-12-28 DIAGNOSIS — J02.9 PHARYNGITIS, UNSPECIFIED ETIOLOGY: Primary | ICD-10-CM

## 2022-12-28 LAB
EXPIRATION DATE: NORMAL
EXPIRATION DATE: NORMAL
FLUAV AG UPPER RESP QL IA.RAPID: NOT DETECTED
FLUBV AG UPPER RESP QL IA.RAPID: NOT DETECTED
INTERNAL CONTROL: NORMAL
INTERNAL CONTROL: NORMAL
Lab: NORMAL
Lab: NORMAL
S PYO AG THROAT QL: NEGATIVE
SARS-COV-2 RNA RESP QL NAA+PROBE: NOT DETECTED

## 2022-12-28 PROCEDURE — 87880 STREP A ASSAY W/OPTIC: CPT | Performed by: NURSE PRACTITIONER

## 2022-12-28 PROCEDURE — 87428 SARSCOV & INF VIR A&B AG IA: CPT | Performed by: NURSE PRACTITIONER

## 2022-12-28 PROCEDURE — 99214 OFFICE O/P EST MOD 30 MIN: CPT | Performed by: NURSE PRACTITIONER

## 2022-12-28 RX ORDER — AMOXICILLIN AND CLAVULANATE POTASSIUM 875; 125 MG/1; MG/1
TABLET, FILM COATED ORAL
COMMUNITY
Start: 2022-10-21 | End: 2022-12-28

## 2022-12-28 RX ORDER — AMOXICILLIN AND CLAVULANATE POTASSIUM 875; 125 MG/1; MG/1
1 TABLET, FILM COATED ORAL 2 TIMES DAILY
Qty: 20 TABLET | Refills: 0 | Status: SHIPPED | OUTPATIENT
Start: 2022-12-28 | End: 2023-01-23 | Stop reason: HOSPADM

## 2022-12-28 NOTE — PROGRESS NOTES
Chief Complaint  Sore Throat    Subjective          Tr Brito presents to [facility]  History of Present Illness    Tr Brito is a 32-year-old male who presents today for evaluation of a sore throat.    Pharyngitis  The patient reports that he has been experiencing throat infections intermittently. He was previously seen in the hospital for an abscess in his throat. He went to the emergency department because he was unable to swallow water 2 months ago. The patient reports that he does have his tonsils in place. He reports recently experiencing some nausea. He denies any headaches, fevers, chills, rhinorrhea, nasal congestion, or cough. The patient notes that he has been taking ibuprofen and Tylenol for his symptoms. He reports that he took some leftover antibiotics with some improvement since yesterday. The patient tested negative for COVID-19, negative for strep throat, and negative for influenza in the office today.    Health maintenance  The patient has been seen by ENT in the past for a broken nose during high school basketball. He notes that he finds himself breathing through his mouth at night for years because he cannot breathe through his nose very well. He denies any known allergies.      No new chest pain, shortness of breath, headaches, visual changes, dizziness, palpitations, syncope, swelling in the lower extremities, or shortness of breath when laying down. No abdominal pain, constipation, hematuria or hematochezia, or dysuria. No new lumps, bumps, or rashes.      Current Outpatient Medications:   •  cetirizine (zyrTEC) 10 MG tablet, Take 10 mg by mouth Daily., Disp: , Rfl:   •  sildenafil (Viagra) 100 MG tablet, Take 1 tablet by mouth Daily As Needed for Erectile Dysfunction., Disp: 30 tablet, Rfl: 3  •  amoxicillin-clavulanate (Augmentin) 875-125 MG per tablet, Take 1 tablet by mouth 2 (Two) Times a Day., Disp: 20 tablet, Rfl:  0       Objective   Vital Signs:   /70 (BP Location: Right arm, Patient Position: Sitting, Cuff Size: Adult)   Pulse 76   Temp 98.6 °F (37 °C) (Temporal)   Resp 20   Wt 84 kg (185 lb 4 oz)   SpO2 98%   BMI 24.44 kg/m²     Physical Exam  Vitals and nursing note reviewed.   Constitutional:       General: He is not in acute distress.     Appearance: He is well-developed. He is not ill-appearing, toxic-appearing or diaphoretic.   HENT:      Head: Normocephalic and atraumatic. No abrasion. Hair is normal.      Right Ear: Hearing, tympanic membrane, ear canal and external ear normal. No foreign body. Tympanic membrane is not perforated or erythematous.      Left Ear: Hearing, tympanic membrane, ear canal and external ear normal. No foreign body. Tympanic membrane is not perforated or erythematous.      Nose: Nose normal. No septal deviation, mucosal edema or rhinorrhea.      Mouth/Throat:      Mouth: No oral lesions.      Dentition: Normal dentition.      Pharynx: Oropharyngeal exudate and posterior oropharyngeal erythema present.   Eyes:      General: Lids are normal. No scleral icterus.        Right eye: No discharge.         Left eye: No discharge.      Conjunctiva/sclera: Conjunctivae normal.      Right eye: Right conjunctiva is not injected.      Left eye: Left conjunctiva is not injected.      Pupils: Pupils are equal, round, and reactive to light.   Neck:      Thyroid: No thyroid mass or thyromegaly.   Cardiovascular:      Rate and Rhythm: Normal rate and regular rhythm.      Heart sounds: Normal heart sounds. No murmur heard.    No friction rub. No gallop.   Pulmonary:      Effort: Pulmonary effort is normal. No accessory muscle usage.      Breath sounds: Normal breath sounds. No rhonchi or rales.   Chest:      Chest wall: No tenderness.   Abdominal:      General: Bowel sounds are normal. There is no distension.      Palpations: Abdomen is soft. There is no hepatomegaly.      Tenderness: There is no  abdominal tenderness.   Musculoskeletal:         General: No tenderness or deformity. Normal range of motion.      Cervical back: Full passive range of motion without pain, normal range of motion and neck supple. No edema. Normal range of motion.   Lymphadenopathy:      Cervical: No cervical adenopathy.   Skin:     General: Skin is warm and dry.      Findings: No abrasion, erythema or rash.      Nails: There is no clubbing.   Neurological:      Mental Status: He is alert, oriented to person, place, and time and easily aroused.      Cranial Nerves: No cranial nerve deficit.      Coordination: Coordination normal.      Deep Tendon Reflexes: Reflexes are normal and symmetric.      Comments: Muscle strength 5/5 and equal throughout.   Psychiatric:         Speech: Speech normal.         Behavior: Behavior normal. Behavior is cooperative.        Result Review :                 Assessment and Plan    Diagnoses and all orders for this visit:    1. Pharyngitis, unspecified etiology (Primary)  -     Covid-19 + Flu A&B AG, Veritor; Future  -     POC Rapid Strep A; Future  -     POC Rapid Strep A  -     Covid-19 + Flu A&B AG, Veritor  -     amoxicillin-clavulanate (Augmentin) 875-125 MG per tablet; Take 1 tablet by mouth 2 (Two) Times a Day.  Dispense: 20 tablet; Refill: 0  -     Ambulatory Referral to ENT (Otolaryngology)      Pharyngitis    - We advised the patient to throw away his toothbrush and to try gargling salt water.  - The patient tested negative for COVID-19, negative for strep throat, and negative for influenza in the office today.        Recurrent pharyngitis     Follow Up   Return if symptoms worsen or fail to improve.  Patient was given instructions and counseling regarding his condition or for health maintenance advice. Please see specific information pulled into the AVS if appropriate.     RTC/call  If symptoms worsen  Meds MOA and SE's reviewed and pt v/u    ZAFAR Palma  [facility]  [address]  [facility] [address]  Fax [phone]  Phone [phone]      Transcribed from ambient dictation for ZAFAR Palma by Sue Ledbetter.  12/28/22   18:05 EST    Patient or patient representative verbalized consent to the visit recording.  I have personally performed the services described in this document as transcribed by the above individual, and it is both accurate and complete.

## 2023-01-18 ENCOUNTER — TELEPHONE (OUTPATIENT)
Dept: INTERNAL MEDICINE | Facility: CLINIC | Age: 33
End: 2023-01-18
Payer: COMMERCIAL

## 2023-01-18 NOTE — TELEPHONE ENCOUNTER
Caller: Tr Brito    Relationship: Self    Best call back number:    [phone]         What medication are you requesting: SOMETHING FOR NAUSEA    What are your current symptoms: NAUSEA    How long have you been experiencing symptoms: 2 DAYS    Have you had these symptoms before:    [] Yes  [x] No    Have you been treated for these symptoms before:   [] Yes  [x] No    If a prescription is needed, what is your preferred pharmacy and phone number: [facility] [id number] [address] [facility] [phone] [facility] [phone]      Additional notes:PATIENT CALLED IN STATED HE TESTED POSITIVE FOR COVID ON 01/09/23 AND AS A RESULT PATIENT IS HAVING CONTINUED NAUSEA

## 2023-01-19 RX ORDER — ONDANSETRON HYDROCHLORIDE 8 MG/1
8 TABLET, FILM COATED ORAL EVERY 8 HOURS PRN
Qty: 25 TABLET | Refills: 2 | Status: SHIPPED | OUTPATIENT
Start: 2023-01-19

## 2023-01-19 NOTE — TELEPHONE ENCOUNTER
furosemide (LASIX) 20 MG tablet [Pharmacy Med Name: FUROSEMIDE 20MG TABS]   Routing refill request to provider for review/approval because:  Labs out of range:  CR  T'd up 1 month for provider review.    Felisa Rodriguez RN           Spoke to patient and notified of message. Good verb was given.

## 2023-01-21 ENCOUNTER — APPOINTMENT (OUTPATIENT)
Dept: CT IMAGING | Facility: HOSPITAL | Age: 33
End: 2023-01-21
Payer: COMMERCIAL

## 2023-01-21 ENCOUNTER — HOSPITAL ENCOUNTER (OUTPATIENT)
Facility: HOSPITAL | Age: 33
Discharge: HOME | End: 2023-01-23
Attending: STUDENT IN AN ORGANIZED HEALTH CARE EDUCATION/TRAINING PROGRAM | Admitting: INTERNAL MEDICINE
Payer: COMMERCIAL

## 2023-01-21 ENCOUNTER — APPOINTMENT (OUTPATIENT)
Dept: ULTRASOUND IMAGING | Facility: HOSPITAL | Age: 33
End: 2023-01-21
Payer: COMMERCIAL

## 2023-01-21 DIAGNOSIS — U07.1 COVID-19: ICD-10-CM

## 2023-01-21 DIAGNOSIS — R10.9 ABDOMINAL PAIN: ICD-10-CM

## 2023-01-21 DIAGNOSIS — R10.9 INTRACTABLE ABDOMINAL PAIN: Primary | ICD-10-CM

## 2023-01-21 DIAGNOSIS — R11.2 NAUSEA AND VOMITING, UNSPECIFIED VOMITING TYPE: ICD-10-CM

## 2023-01-21 LAB
ALBUMIN SERPL-MCNC: 4.7 G/DL (ref 3.5–5.2)
ALBUMIN/GLOB SERPL: 1.9 G/DL
ALP SERPL-CCNC: 73 U/L (ref 39–117)
ALT SERPL W P-5'-P-CCNC: 38 U/L (ref 1–41)
AMPHET+METHAMPHET UR QL: NEGATIVE
AMPHETAMINES UR QL: NEGATIVE
ANION GAP SERPL CALCULATED.3IONS-SCNC: 14 MMOL/L (ref 5–15)
AST SERPL-CCNC: 37 U/L (ref 1–40)
BARBITURATES UR QL SCN: NEGATIVE
BASOPHILS # BLD AUTO: 0.02 10*3/MM3 (ref 0–0.2)
BASOPHILS NFR BLD AUTO: 0.2 % (ref 0–1.5)
BENZODIAZ UR QL SCN: NEGATIVE
BILIRUB SERPL-MCNC: 0.5 MG/DL (ref 0–1.2)
BILIRUB UR QL STRIP: NEGATIVE
BUN SERPL-MCNC: 11 MG/DL (ref 6–20)
BUN/CREAT SERPL: 13.1 (ref 7–25)
BUPRENORPHINE SERPL-MCNC: NEGATIVE NG/ML
CALCIUM SPEC-SCNC: 8.8 MG/DL (ref 8.6–10.5)
CANNABINOIDS SERPL QL: NEGATIVE
CHLORIDE SERPL-SCNC: 101 MMOL/L (ref 98–107)
CLARITY UR: CLEAR
CO2 SERPL-SCNC: 23 MMOL/L (ref 22–29)
COCAINE UR QL: NEGATIVE
COLOR UR: YELLOW
CREAT SERPL-MCNC: 0.84 MG/DL (ref 0.76–1.27)
D-LACTATE SERPL-SCNC: 0.9 MMOL/L (ref 0.5–2)
DEPRECATED RDW RBC AUTO: 41.2 FL (ref 37–54)
EGFRCR SERPLBLD CKD-EPI 2021: 120.3 ML/MIN/1.73
EOSINOPHIL # BLD AUTO: 0 10*3/MM3 (ref 0–0.4)
EOSINOPHIL NFR BLD AUTO: 0 % (ref 0.3–6.2)
ERYTHROCYTE [DISTWIDTH] IN BLOOD BY AUTOMATED COUNT: 11.9 % (ref 12.3–15.4)
FLUAV RNA RESP QL NAA+PROBE: NOT DETECTED
FLUBV RNA RESP QL NAA+PROBE: NOT DETECTED
GLOBULIN UR ELPH-MCNC: 2.5 GM/DL
GLUCOSE SERPL-MCNC: 101 MG/DL (ref 65–99)
GLUCOSE UR STRIP-MCNC: NEGATIVE MG/DL
HCT VFR BLD AUTO: 42.9 % (ref 37.5–51)
HGB BLD-MCNC: 14.5 G/DL (ref 13–17.7)
HGB UR QL STRIP.AUTO: NEGATIVE
IMM GRANULOCYTES # BLD AUTO: 0.02 10*3/MM3 (ref 0–0.05)
IMM GRANULOCYTES NFR BLD AUTO: 0.2 % (ref 0–0.5)
KETONES UR QL STRIP: ABNORMAL
LEUKOCYTE ESTERASE UR QL STRIP.AUTO: NEGATIVE
LIPASE SERPL-CCNC: 26 U/L (ref 13–60)
LYMPHOCYTES # BLD AUTO: 0.89 10*3/MM3 (ref 0.7–3.1)
LYMPHOCYTES NFR BLD AUTO: 10.7 % (ref 19.6–45.3)
MAGNESIUM SERPL-MCNC: 1.5 MG/DL (ref 1.6–2.6)
MAGNESIUM SERPL-MCNC: 1.7 MG/DL (ref 1.6–2.6)
MCH RBC QN AUTO: 31.7 PG (ref 26.6–33)
MCHC RBC AUTO-ENTMCNC: 33.8 G/DL (ref 31.5–35.7)
MCV RBC AUTO: 93.9 FL (ref 79–97)
METHADONE UR QL SCN: NEGATIVE
MONOCYTES # BLD AUTO: 0.29 10*3/MM3 (ref 0.1–0.9)
MONOCYTES NFR BLD AUTO: 3.5 % (ref 5–12)
NEUTROPHILS NFR BLD AUTO: 7.1 10*3/MM3 (ref 1.7–7)
NEUTROPHILS NFR BLD AUTO: 85.4 % (ref 42.7–76)
NITRITE UR QL STRIP: NEGATIVE
NRBC BLD AUTO-RTO: 0 /100 WBC (ref 0–0.2)
OPIATES UR QL: POSITIVE
OXYCODONE UR QL SCN: NEGATIVE
PCP UR QL SCN: NEGATIVE
PH UR STRIP.AUTO: 5.5 [PH] (ref 5–8)
PHOSPHATE SERPL-MCNC: 2.6 MG/DL (ref 2.5–4.5)
PLATELET # BLD AUTO: 284 10*3/MM3 (ref 140–450)
PMV BLD AUTO: 9.9 FL (ref 6–12)
POTASSIUM SERPL-SCNC: 4.1 MMOL/L (ref 3.5–5.2)
PROPOXYPH UR QL: NEGATIVE
PROT SERPL-MCNC: 7.2 G/DL (ref 6–8.5)
PROT UR QL STRIP: NEGATIVE
RBC # BLD AUTO: 4.57 10*6/MM3 (ref 4.14–5.8)
RSV RNA NPH QL NAA+NON-PROBE: NOT DETECTED
SARS-COV-2 RNA RESP QL NAA+PROBE: DETECTED
SODIUM SERPL-SCNC: 138 MMOL/L (ref 136–145)
SP GR UR STRIP: 1.07 (ref 1–1.03)
TRICYCLICS UR QL SCN: NEGATIVE
UROBILINOGEN UR QL STRIP: ABNORMAL
WBC NRBC COR # BLD: 8.32 10*3/MM3 (ref 3.4–10.8)

## 2023-01-21 PROCEDURE — 83690 ASSAY OF LIPASE: CPT | Performed by: STUDENT IN AN ORGANIZED HEALTH CARE EDUCATION/TRAINING PROGRAM

## 2023-01-21 PROCEDURE — 76705 ECHO EXAM OF ABDOMEN: CPT

## 2023-01-21 PROCEDURE — 99223 1ST HOSP IP/OBS HIGH 75: CPT | Performed by: STUDENT IN AN ORGANIZED HEALTH CARE EDUCATION/TRAINING PROGRAM

## 2023-01-21 PROCEDURE — 96375 TX/PRO/DX INJ NEW DRUG ADDON: CPT

## 2023-01-21 PROCEDURE — 85025 COMPLETE CBC W/AUTO DIFF WBC: CPT | Performed by: STUDENT IN AN ORGANIZED HEALTH CARE EDUCATION/TRAINING PROGRAM

## 2023-01-21 PROCEDURE — 80053 COMPREHEN METABOLIC PANEL: CPT | Performed by: STUDENT IN AN ORGANIZED HEALTH CARE EDUCATION/TRAINING PROGRAM

## 2023-01-21 PROCEDURE — 87637 SARSCOV2&INF A&B&RSV AMP PRB: CPT | Performed by: STUDENT IN AN ORGANIZED HEALTH CARE EDUCATION/TRAINING PROGRAM

## 2023-01-21 PROCEDURE — 25010000002 KETOROLAC TROMETHAMINE PER 15 MG: Performed by: STUDENT IN AN ORGANIZED HEALTH CARE EDUCATION/TRAINING PROGRAM

## 2023-01-21 PROCEDURE — 96376 TX/PRO/DX INJ SAME DRUG ADON: CPT

## 2023-01-21 PROCEDURE — 74177 CT ABD & PELVIS W/CONTRAST: CPT

## 2023-01-21 PROCEDURE — 83605 ASSAY OF LACTIC ACID: CPT | Performed by: STUDENT IN AN ORGANIZED HEALTH CARE EDUCATION/TRAINING PROGRAM

## 2023-01-21 PROCEDURE — 25010000002 MORPHINE PER 10 MG: Performed by: STUDENT IN AN ORGANIZED HEALTH CARE EDUCATION/TRAINING PROGRAM

## 2023-01-21 PROCEDURE — 25010000002 ONDANSETRON PER 1 MG: Performed by: STUDENT IN AN ORGANIZED HEALTH CARE EDUCATION/TRAINING PROGRAM

## 2023-01-21 PROCEDURE — 83735 ASSAY OF MAGNESIUM: CPT | Performed by: STUDENT IN AN ORGANIZED HEALTH CARE EDUCATION/TRAINING PROGRAM

## 2023-01-21 PROCEDURE — 84100 ASSAY OF PHOSPHORUS: CPT | Performed by: STUDENT IN AN ORGANIZED HEALTH CARE EDUCATION/TRAINING PROGRAM

## 2023-01-21 PROCEDURE — 25010000002 FENTANYL CITRATE (PF) 50 MCG/ML SOLUTION: Performed by: STUDENT IN AN ORGANIZED HEALTH CARE EDUCATION/TRAINING PROGRAM

## 2023-01-21 PROCEDURE — 25010000002 DROPERIDOL PER 5 MG: Performed by: STUDENT IN AN ORGANIZED HEALTH CARE EDUCATION/TRAINING PROGRAM

## 2023-01-21 PROCEDURE — C9803 HOPD COVID-19 SPEC COLLECT: HCPCS

## 2023-01-21 PROCEDURE — 0 MAGNESIUM SULFATE 4 GM/100ML SOLUTION: Performed by: STUDENT IN AN ORGANIZED HEALTH CARE EDUCATION/TRAINING PROGRAM

## 2023-01-21 PROCEDURE — 25010000002 MORPHINE PER 10 MG: Performed by: INTERNAL MEDICINE

## 2023-01-21 PROCEDURE — 99285 EMERGENCY DEPT VISIT HI MDM: CPT

## 2023-01-21 PROCEDURE — 81003 URINALYSIS AUTO W/O SCOPE: CPT | Performed by: STUDENT IN AN ORGANIZED HEALTH CARE EDUCATION/TRAINING PROGRAM

## 2023-01-21 PROCEDURE — G0378 HOSPITAL OBSERVATION PER HR: HCPCS

## 2023-01-21 PROCEDURE — 80306 DRUG TEST PRSMV INSTRMNT: CPT | Performed by: STUDENT IN AN ORGANIZED HEALTH CARE EDUCATION/TRAINING PROGRAM

## 2023-01-21 PROCEDURE — 25010000002 HYDROMORPHONE PER 4 MG: Performed by: STUDENT IN AN ORGANIZED HEALTH CARE EDUCATION/TRAINING PROGRAM

## 2023-01-21 PROCEDURE — 96361 HYDRATE IV INFUSION ADD-ON: CPT

## 2023-01-21 PROCEDURE — 25010000002 IOPAMIDOL 61 % SOLUTION: Performed by: STUDENT IN AN ORGANIZED HEALTH CARE EDUCATION/TRAINING PROGRAM

## 2023-01-21 PROCEDURE — 96374 THER/PROPH/DIAG INJ IV PUSH: CPT

## 2023-01-21 PROCEDURE — 99222 1ST HOSP IP/OBS MODERATE 55: CPT | Performed by: INTERNAL MEDICINE

## 2023-01-21 RX ORDER — SODIUM CHLORIDE 0.9 % (FLUSH) 0.9 %
10 SYRINGE (ML) INJECTION EVERY 12 HOURS SCHEDULED
Status: DISCONTINUED | OUTPATIENT
Start: 2023-01-21 | End: 2023-01-23 | Stop reason: HOSPADM

## 2023-01-21 RX ORDER — SODIUM CHLORIDE, SODIUM LACTATE, POTASSIUM CHLORIDE, CALCIUM CHLORIDE 600; 310; 30; 20 MG/100ML; MG/100ML; MG/100ML; MG/100ML
100 INJECTION, SOLUTION INTRAVENOUS CONTINUOUS
Status: ACTIVE | OUTPATIENT
Start: 2023-01-21 | End: 2023-01-22

## 2023-01-21 RX ORDER — NALOXONE HCL 0.4 MG/ML
0.4 VIAL (ML) INJECTION
Status: DISCONTINUED | OUTPATIENT
Start: 2023-01-21 | End: 2023-01-23 | Stop reason: HOSPADM

## 2023-01-21 RX ORDER — SODIUM CHLORIDE 9 MG/ML
40 INJECTION, SOLUTION INTRAVENOUS AS NEEDED
Status: DISCONTINUED | OUTPATIENT
Start: 2023-01-21 | End: 2023-01-23 | Stop reason: HOSPADM

## 2023-01-21 RX ORDER — OXYCODONE HYDROCHLORIDE AND ACETAMINOPHEN 5; 325 MG/1; MG/1
1 TABLET ORAL EVERY 4 HOURS PRN
Status: DISCONTINUED | OUTPATIENT
Start: 2023-01-21 | End: 2023-01-23 | Stop reason: HOSPADM

## 2023-01-21 RX ORDER — FENTANYL CITRATE 50 UG/ML
25 INJECTION, SOLUTION INTRAMUSCULAR; INTRAVENOUS ONCE
Status: COMPLETED | OUTPATIENT
Start: 2023-01-21 | End: 2023-01-21

## 2023-01-21 RX ORDER — LIDOCAINE HYDROCHLORIDE 20 MG/ML
15 SOLUTION OROPHARYNGEAL ONCE
Status: COMPLETED | OUTPATIENT
Start: 2023-01-21 | End: 2023-01-21

## 2023-01-21 RX ORDER — ALUMINA, MAGNESIA, AND SIMETHICONE 2400; 2400; 240 MG/30ML; MG/30ML; MG/30ML
15 SUSPENSION ORAL ONCE
Status: COMPLETED | OUTPATIENT
Start: 2023-01-21 | End: 2023-01-21

## 2023-01-21 RX ORDER — CETIRIZINE HYDROCHLORIDE 10 MG/1
10 TABLET ORAL DAILY
Status: DISCONTINUED | OUTPATIENT
Start: 2023-01-21 | End: 2023-01-21

## 2023-01-21 RX ORDER — HYDROXYZINE HYDROCHLORIDE 25 MG/1
25 TABLET, FILM COATED ORAL ONCE
Status: COMPLETED | OUTPATIENT
Start: 2023-01-21 | End: 2023-01-21

## 2023-01-21 RX ORDER — PANTOPRAZOLE SODIUM 40 MG/10ML
40 INJECTION, POWDER, LYOPHILIZED, FOR SOLUTION INTRAVENOUS ONCE
Status: COMPLETED | OUTPATIENT
Start: 2023-01-21 | End: 2023-01-21

## 2023-01-21 RX ORDER — AMOXICILLIN 250 MG
2 CAPSULE ORAL 2 TIMES DAILY
Status: DISCONTINUED | OUTPATIENT
Start: 2023-01-21 | End: 2023-01-23 | Stop reason: HOSPADM

## 2023-01-21 RX ORDER — BISACODYL 5 MG/1
5 TABLET, DELAYED RELEASE ORAL DAILY PRN
Status: DISCONTINUED | OUTPATIENT
Start: 2023-01-21 | End: 2023-01-23 | Stop reason: HOSPADM

## 2023-01-21 RX ORDER — DIPHENOXYLATE HYDROCHLORIDE AND ATROPINE SULFATE 2.5; .025 MG/1; MG/1
1 TABLET ORAL DAILY
Status: DISCONTINUED | OUTPATIENT
Start: 2023-01-22 | End: 2023-01-23 | Stop reason: HOSPADM

## 2023-01-21 RX ORDER — MAGNESIUM SULFATE HEPTAHYDRATE 40 MG/ML
4 INJECTION, SOLUTION INTRAVENOUS ONCE
Status: COMPLETED | OUTPATIENT
Start: 2023-01-21 | End: 2023-01-22

## 2023-01-21 RX ORDER — ACETAMINOPHEN 500 MG
1000 TABLET ORAL 3 TIMES DAILY
Status: DISCONTINUED | OUTPATIENT
Start: 2023-01-21 | End: 2023-01-23 | Stop reason: HOSPADM

## 2023-01-21 RX ORDER — ONDANSETRON 2 MG/ML
4 INJECTION INTRAMUSCULAR; INTRAVENOUS ONCE
Status: COMPLETED | OUTPATIENT
Start: 2023-01-21 | End: 2023-01-21

## 2023-01-21 RX ORDER — DROPERIDOL 2.5 MG/ML
2.5 INJECTION, SOLUTION INTRAMUSCULAR; INTRAVENOUS ONCE
Status: COMPLETED | OUTPATIENT
Start: 2023-01-21 | End: 2023-01-21

## 2023-01-21 RX ORDER — KETOROLAC TROMETHAMINE 15 MG/ML
15 INJECTION, SOLUTION INTRAMUSCULAR; INTRAVENOUS ONCE
Status: COMPLETED | OUTPATIENT
Start: 2023-01-21 | End: 2023-01-21

## 2023-01-21 RX ORDER — BISACODYL 10 MG
10 SUPPOSITORY, RECTAL RECTAL DAILY PRN
Status: DISCONTINUED | OUTPATIENT
Start: 2023-01-21 | End: 2023-01-23 | Stop reason: HOSPADM

## 2023-01-21 RX ORDER — HYDROMORPHONE HYDROCHLORIDE 1 MG/ML
0.25 INJECTION, SOLUTION INTRAMUSCULAR; INTRAVENOUS; SUBCUTANEOUS ONCE
Status: COMPLETED | OUTPATIENT
Start: 2023-01-21 | End: 2023-01-21

## 2023-01-21 RX ORDER — FOLIC ACID 1 MG/1
1 TABLET ORAL DAILY
Status: DISCONTINUED | OUTPATIENT
Start: 2023-01-22 | End: 2023-01-23 | Stop reason: HOSPADM

## 2023-01-21 RX ORDER — ONDANSETRON 2 MG/ML
4 INJECTION INTRAMUSCULAR; INTRAVENOUS EVERY 6 HOURS PRN
Status: DISCONTINUED | OUTPATIENT
Start: 2023-01-21 | End: 2023-01-23 | Stop reason: HOSPADM

## 2023-01-21 RX ORDER — MORPHINE SULFATE 2 MG/ML
1 INJECTION, SOLUTION INTRAMUSCULAR; INTRAVENOUS EVERY 4 HOURS PRN
Status: DISCONTINUED | OUTPATIENT
Start: 2023-01-21 | End: 2023-01-23 | Stop reason: HOSPADM

## 2023-01-21 RX ORDER — POLYETHYLENE GLYCOL 3350 17 G/17G
17 POWDER, FOR SOLUTION ORAL DAILY PRN
Status: DISCONTINUED | OUTPATIENT
Start: 2023-01-21 | End: 2023-01-23 | Stop reason: HOSPADM

## 2023-01-21 RX ORDER — SODIUM CHLORIDE 0.9 % (FLUSH) 0.9 %
10 SYRINGE (ML) INJECTION AS NEEDED
Status: DISCONTINUED | OUTPATIENT
Start: 2023-01-21 | End: 2023-01-23 | Stop reason: HOSPADM

## 2023-01-21 RX ORDER — HYDROMORPHONE HYDROCHLORIDE 1 MG/ML
0.5 INJECTION, SOLUTION INTRAMUSCULAR; INTRAVENOUS; SUBCUTANEOUS ONCE
Status: COMPLETED | OUTPATIENT
Start: 2023-01-21 | End: 2023-01-21

## 2023-01-21 RX ADMIN — SODIUM CHLORIDE, POTASSIUM CHLORIDE, SODIUM LACTATE AND CALCIUM CHLORIDE 1500 ML: 600; 310; 30; 20 INJECTION, SOLUTION INTRAVENOUS at 04:16

## 2023-01-21 RX ADMIN — ACETAMINOPHEN 1000 MG: 500 TABLET, FILM COATED ORAL at 20:15

## 2023-01-21 RX ADMIN — HYDROXYZINE HYDROCHLORIDE 25 MG: 25 TABLET, FILM COATED ORAL at 07:52

## 2023-01-21 RX ADMIN — ALUMINUM HYDROXIDE, MAGNESIUM HYDROXIDE, AND DIMETHICONE 15 ML: 400; 400; 40 SUSPENSION ORAL at 05:41

## 2023-01-21 RX ADMIN — PANTOPRAZOLE SODIUM 40 MG: 40 INJECTION, POWDER, FOR SOLUTION INTRAVENOUS at 09:37

## 2023-01-21 RX ADMIN — THIAMINE HCL TAB 100 MG 100 MG: 100 TAB at 17:19

## 2023-01-21 RX ADMIN — MAGNESIUM SULFATE HEPTAHYDRATE 4 G: 40 INJECTION, SOLUTION INTRAVENOUS at 21:04

## 2023-01-21 RX ADMIN — Medication 10 ML: at 10:56

## 2023-01-21 RX ADMIN — Medication 10 ML: at 20:15

## 2023-01-21 RX ADMIN — HYDROMORPHONE HYDROCHLORIDE 0.5 MG: 1 INJECTION, SOLUTION INTRAMUSCULAR; INTRAVENOUS; SUBCUTANEOUS at 04:18

## 2023-01-21 RX ADMIN — IOPAMIDOL 100 ML: 612 INJECTION, SOLUTION INTRAVENOUS at 04:35

## 2023-01-21 RX ADMIN — ONDANSETRON 4 MG: 2 INJECTION INTRAMUSCULAR; INTRAVENOUS at 04:18

## 2023-01-21 RX ADMIN — MORPHINE SULFATE 1 MG: 2 INJECTION, SOLUTION INTRAMUSCULAR; INTRAVENOUS at 11:05

## 2023-01-21 RX ADMIN — DROPERIDOL 2.5 MG: 2.5 INJECTION, SOLUTION INTRAMUSCULAR; INTRAVENOUS at 05:41

## 2023-01-21 RX ADMIN — LIDOCAINE HYDROCHLORIDE 15 ML: 20 SOLUTION ORAL; TOPICAL at 05:41

## 2023-01-21 RX ADMIN — KETOROLAC TROMETHAMINE 15 MG: 15 INJECTION, SOLUTION INTRAMUSCULAR; INTRAVENOUS at 04:18

## 2023-01-21 RX ADMIN — SODIUM CHLORIDE, POTASSIUM CHLORIDE, SODIUM LACTATE AND CALCIUM CHLORIDE 100 ML/HR: 600; 310; 30; 20 INJECTION, SOLUTION INTRAVENOUS at 14:10

## 2023-01-21 RX ADMIN — ACETAMINOPHEN 1000 MG: 500 TABLET, FILM COATED ORAL at 17:19

## 2023-01-21 RX ADMIN — FENTANYL CITRATE 25 MCG: 50 INJECTION, SOLUTION INTRAMUSCULAR; INTRAVENOUS at 07:53

## 2023-01-21 RX ADMIN — HYDROMORPHONE HYDROCHLORIDE 0.25 MG: 1 INJECTION, SOLUTION INTRAMUSCULAR; INTRAVENOUS; SUBCUTANEOUS at 04:48

## 2023-01-21 NOTE — H&P
"    [facility] Medicine Services  HISTORY AND PHYSICAL    Patient Name: Tr Brito  : 1990  MRN: [id number]  Primary Care Physician: Jorje Hurtado MD  Date of admission: 2023    Subjective   Subjective     Chief Complaint:  Tractable abdominal pain    HPI:  Tr Brito is a 32 y.o. male with anxiety, mild depression, erectile dysfunction, alcoholism, prior possible alcohol withdrawal, HTN, who presented for evaluation of intractable abdominal pain.  Patient reports sudden onset at noon yesterday of \"the worst pain ever\" is localized ~2 inches above the umbilicus that is sharp and constant in nature.  Pain does not radiate.  Denied any prior episodes.  Denied any inciting events.  Has taken 2 tabs of ibuprofen 2-3 times in the past week.  Endorsed nausea and vomiting for the past day with 7 total episodes of nonbloody nonbilious emesis.  Denied any fevers, chills, headaches, vision changes, chest pain, palpitations, diarrhea constipation, hematochezia, hematuria, or dysuria.  No testicular pain.  He says his last drink was 3 to 4 days ago.  He states he drinks \"a few drinks a week\".  States he used to drink more heavily, but not for several months.  No history of ulcers.  States he went to an OSH  yesterday for similar symptoms.  He states that Dilaudid and Phenergan that he was given at the OSH helped his symptoms.  Per care everywhere, patient was given bolus, morphine, Zofran, Bentyl, Toradol, fentanyl, Phenergan, Dilaudid.  At OSH, labs were notable for lipase 97, AST 41, ALT 60.  CT abdomen at OSH showed interval development of mesenteric edema in the pelvis.  Patient reports he was diagnosed with COVID-19 2 weeks ago (data deficit).    In the ED, patient was afebrile, satting well on room air, /102.  Labs showed positive opiates on UDS, positive COVID-19, BUN 11, creatinine 0.84, AST 37, ALT 38, T bili 0.5, lipase 26, lactic 0.9, CBC with normal WBC " and hemoglobin.  Ultrasound gallbladder was unremarkable, common bile duct was 5 mm.  CT abdomen pelvis with contrast showed no acute abnormality.  He was given Maalox, droperidol, fentanyl, 2 doses of Dilaudid, Toradol, 1.5 L LR bolus, Zofran, and started on a PPI.  He is admitted for further treatment and evaluation of intractable abdominal pain.    Review of Systems   Constitutional: Negative for chills and fever.   HENT: Negative for congestion and rhinorrhea.    Eyes: Negative for pain and redness.   Respiratory: Negative for cough and shortness of breath.    Cardiovascular: Negative for chest pain and leg swelling.   Gastrointestinal: Positive for abdominal pain, nausea and vomiting. Negative for blood in stool, constipation and diarrhea.   Genitourinary: Negative for dysuria, hematuria, penile pain, penile swelling, scrotal swelling and testicular pain.   Musculoskeletal: Negative for back pain and myalgias.   Skin: Negative for rash and wound.   Neurological: Negative for numbness and headaches.   Psychiatric/Behavioral: Negative for behavioral problems and confusion.        All other systems reviewed and are negative.     Personal History     Past Medical History:   Diagnosis Date   • Colitis    • Hypertension    • Kidney stone              Past Surgical History:   Procedure Laterality Date   • NOSE SURGERY      fracture       Family History:  family history includes ADD / ADHD in his mother; Anemia in his mother; Arthritis in his maternal grandmother; Diabetes in his father and mother; Heart attack in his paternal grandmother; Hyperlipidemia in his father; Hypertension in his father and mother; Mental illness in his mother; Obesity in his mother. Denied any history of blood disorders in the family except for his mom who  of MDS.     Social History:  reports that he has never smoked. He has never used smokeless tobacco. He reports current alcohol use. He reports that he does not use drugs.  Social  History     Social History Narrative   • Not on file       Medications:  amoxicillin-clavulanate, cetirizine, ondansetron, and sildenafil patient states he doesn't take of his medications.     No Known Allergies    Objective   Objective     Vital Signs:   Temp:  [97.6 °F (36.4 °C)-98.3 °F (36.8 °C)] 98.3 °F (36.8 °C)  Heart Rate:  [70-82] 76  Resp:  [18] 18  BP: (141-168)/() 146/95    Physical Exam   Constitutional: Awake, alert, laying in bed and rolling over repeatedly clutching his abdomen  Eyes: PERRL, sclerae anicteric, no conjunctival injection  HENT: NCAT, mucous membranes moist, no intraoral lesions  Neck: Supple, no thyromegaly, no lymphadenopathy, trachea midline  Respiratory: Clear to auscultation bilaterally, nonlabored respirations   Cardiovascular: RRR, no murmurs, rubs, or gallops, palpable pedal pulses bilaterally  Gastrointestinal: Normoactive bowel sounds, soft, nondistended, no guarding, no tenderness to palpation with stethoscope in the epigastric region or bilateral lower quadrants, however, noted tenderness to light palpation with hand throughout  Musculoskeletal: No bilateral ankle edema, no clubbing or cyanosis to extremities  Psychiatric: Appropriate affect, cooperative  Neurologic: PERRL, symmetric facies, symmetric palate rise, tongue midline, moving all extremities equally, speech clear  Skin: No rashes on exposed skin    Result Review:  I have personally reviewed the results from the time of this admission to 1/21/2023 14:03 EST and agree with these findings:  []  Laboratory list / accordion  [x]  Microbiology  [x]  Radiology  []  EKG/Telemetry   []  Cardiology/Vascular   []  Pathology  [x]  Old records  []  Other:  Most notable findings include: as per hpi    LAB RESULTS:      Lab 01/21/23  0409   WBC 8.32   HEMOGLOBIN 14.5   HEMATOCRIT 42.9   PLATELETS 284   NEUTROS ABS 7.10*   IMMATURE GRANS (ABS) 0.02   LYMPHS ABS 0.89   MONOS ABS 0.29   EOS ABS 0.00   MCV 93.9   LACTATE 0.9          Lab 01/21/23  0409   SODIUM 138   POTASSIUM 4.1   CHLORIDE 101   CO2 23.0   ANION GAP 14.0   BUN 11   CREATININE 0.84   EGFR 120.3   GLUCOSE 101*   CALCIUM 8.8   MAGNESIUM 1.5*   PHOSPHORUS 2.6         Lab 01/21/23  0409 01/20/23  1740   TOTAL PROTEIN 7.2  --    ALBUMIN 4.7  --    GLOBULIN 2.5  --    ALT (SGPT) 38  --    AST (SGOT) 37  --    BILIRUBIN 0.5  --    ALK PHOS 73  --    LIPASE 26 97                     Brief Urine Lab Results  (Last result in the past 365 days)      Color   Clarity   Blood   Leuk Est   Nitrite   Protein   CREAT   Urine HCG        01/21/23 0520 Yellow   Clear   Negative   Negative   Negative   Negative               Microbiology Results (last 10 days)     Procedure Component Value - Date/Time    COVID PRE-OP / PRE-PROCEDURE SCREENING ORDER (NO ISOLATION) - Swab, Nasopharynx [[id number]]  (Abnormal) Collected: 01/21/23 0426    Lab Status: Final result Specimen: Swab from Nasopharynx Updated: 01/21/23 0552    Narrative:      The following orders were created for panel order COVID PRE-OP / PRE-PROCEDURE SCREENING ORDER (NO ISOLATION) - Swab, Nasopharynx.  Procedure                               Abnormality         Status                     ---------                               -----------         ------                     COVID-19, FLU A/B, RSV P...[[id number]]  Abnormal            Final result                 Please view results for these tests on the individual orders.    COVID-19, FLU A/B, RSV PCR - Swab, Nasopharynx [[id number]]  (Abnormal) Collected: 01/21/23 0426    Lab Status: Final result Specimen: Swab from Nasopharynx Updated: 01/21/23 0552     COVID19 Detected     Influenza A PCR Not Detected     Influenza B PCR Not Detected     RSV, PCR Not Detected    Narrative:      Fact sheet for providers: https://www.fda.gov/media/793952/download    Fact sheet for patients: https://www.fda.gov/media/256172/download    Test performed by PCR.          CT Abdomen Pelvis Without  Contrast    Result Date: 1/20/2023  CT OF THE ABDOMEN AND PELVIS WITHOUT CONTRAST HISTORY: Right flank pain PROCEDURE:  Routine axial images were obtained from the lung bases to the pubic symphysis.  No contrast was given. This study was performed with techniques to keep radiation doses as low as reasonably achievable, (ALARA). Individualized dose reduction techniques using automated exposure control or adjustment of mA and/or kV according to the patient size were employed. COMPARISON: None. FINDINGS: Abdomen: The gallbladder, solid abdominal organs and ureters are normal. The GI tract is unremarkable, including the appendix. Pelvis: The urinary bladder is normal. There is no pelvic or abdominal ascites, adenopathy, or acute osseous abnormality.    Impression: Unremarkable. Images reviewed, interpreted and dictated by Dr. Andrew Glez MD    CT Abdomen Pelvis With Contrast    Result Date: 1/21/2023  EXAMINATION:  CT ABDOMEN PELVIS W CONTRAST DATE: 1/21/2023 4:34 AM INDICATION: Severe epigastric and right upper quadrant pain, intractable nausea and vomiting ; COMPARISON: None. PROCEDURE:  CT of the abdomen and pelvis was performed following the intravenous administration of iodinated contrast material.  CT dose lowering techniques were used, to include: automated exposure control, adjustment for patient size, and or use of iterative reconstruction. FINDINGS: Visualized lower chest: Unremarkable.  ABDOMEN: Liver and Biliary system:  No suspicious liver lesion. No biliary ductal dilation. Gallbladder:  Vicarious excretion of contrast versus sludge in the gallbladder. No gallbladder luminal distention or other CT findings of gallbladder inflammation. Spleen:  Normal. Pancreas:  Normal. Adrenal glands:  Normal. Kidneys and ureters:  Kidneys enhance symmetrically. No suspicious mass. Contrast excretion into the renal collecting systems and ureters limits evaluation for nonobstructing calculus.  No hydronephrosis. Aorta/IVC:  No aortic aneurysm or dissection.  IVC normal. Lymph nodes:  No lymphadenopathy. Gastrointestinal tract:  Stomach normal.  Small bowel nondilated.  Appendix normal. No colonic wall thickening or dilation. There is some enteric contrast in the proximal small bowel. Peritoneum/Mesentery: No pneumoperitoneum.  No ascites. Abdominal wall: Unremarkable. PELVIS: Urinary bladder: Contains excreted contrast. Reproductive organs: No acute abnormality by CT. Lymph Nodes: No pelvic lymphadenopathy. BONES:  Unremarkable.     Impression:  1.  No acute abnormality. Electronically signed by:  Shoshana Thomas M.D.  1/21/2023 3:08 AM Mountain Time    CT Abdomen Pelvis With Contrast    Result Date: 1/20/2023  CT SCAN OF THE ABDOMEN AND PELVIS WITH CONTRAST    1/20/2023 9:14 PM HISTORY: Epigastric pain. COMPARISON: Same day. PROCEDURE: The patient was injected with IV contrast. Oral contrast was administered. Axial images were obtained from the lung bases to the pubic symphysis by computed tomography. This study was performed with techniques to keep radiation doses as low as reasonably achievable, (ALARA). Individualized dose reduction techniques using automated exposure control or adjustment of mA and/or kV according to the patient size were employed. FINDINGS: ABDOMEN: The lung bases are clear. The gallbladder is unremarkable. The solid organs are otherwise unremarkable. There is no free fluid or adenopathy. PELVIS: The appendix is normal. The urinary bladder is mildly distended. There is no free fluid or adenopathy. There is subtle edema in the mesentery.    Impression: Interval development of mesenteric edema in the pelvis. This is of uncertain etiology. Images reviewed, interpreted, and dictated by LANIE Muñoz MD    US Gallbladder    Result Date: 1/21/2023  US GALLBLADDER Date of Exam: 1/21/2023 7:16 AM EST Indication: severe RUQ pain,. Comparison: No comparisons available. Technique: Grayscale and color Doppler  ultrasound evaluation of the right upper quadrant was performed. FINDINGS: The gallbladder demonstrates no evidence for gallstones, gallbladder wall thickening, or pericholecystic edema. There is no intrahepatic or extrahepatic biliary dilatation. The common bile duct measures approximately 5mm. No focal hepatic abnormalities are seen. The visualized portions of the pancreatic head and proximal body are unremarkable. Screening evaluation of the right kidney demonstrates no evidence for hydronephrosis. Flow is demonstrated within the portal and hepatic veins on Doppler evaluation.     Impression: 1.Unremarkable right upper quadrant ultrasound. Electronically Signed: Yung Eddy  1/21/2023 8:14 AM EST  Workstation ID: [id number]          Assessment & Plan   Assessment & Plan       Intractable abdominal pain      32 y.o. male with anxiety, mild depression, erectile dysfunction, alcoholism, prior possible alcohol withdrawal, HTN, who presented for evaluation of intractable abdominal pain. ED evaluation negative for bladder pathology, pancreatitis.  Patient did not have any abdominal pain when I palpated with the stethoscope in the epigastric and bilateral lower abdominal quadrants, however, had significant abdominal pain when I palpated with my hand in all 4 quadrants.    Differential includes: Peptic ulcer disease, gastritis especially given history of alcohol use, abdominal migraine, biliary pathology, less likely: acute intermittent porphyria    Intractable abdominal pain  -GI consulted, appreciate recs  -Plan for EGD in the AM  -NPO after midnight  -IVF  -Pain control with scheduled Tylenol, Percocet prn, morphine prn; narcan prn ordered  -If continues to have abdominal pain despite GI evaluation, will trial treatment for possible abdominal migraine (metoclopramide 10 mg IV x1, Benadryl 25 mg IV x1, Decadron 10 mg IV x1)  -Zofran PRN  -Tele monitoring; EKG pending for QTc  evaluation    Anxiety  Depression  HTN  Alcohol use disorder - reportedly in remission  Erectile dysfunction  -Patient not currently taking any medications; monitor BP and start anti-hypertensives if persistently elevated.   -Denied any current EtOH withdrawal, denies history of EtOH withdrawal (despite outpatient PCP notes); will place on CIWA with vitamins without ativan (nursing to call if scoring on CIWA)    DVT prophylaxis:  SCDs    CODE STATUS:  FULL  Level Of Support Discussed With: Patient  Code Status (Patient has no pulse and is not breathing): CPR (Attempt to Resuscitate)  Medical Interventions (Patient has pulse or is breathing): Full Support  Release to patient: Routine Release      Expected Discharge  Expected Discharge Date and Time     Expected Discharge Date Expected Discharge Time    Jan 23, 2023           This note has been completed as part of a split-shared workflow.     Signature: Electronically signed by Jenny Narayanan MD, 01/21/23, 2:08 PM EST

## 2023-01-21 NOTE — PLAN OF CARE
Goal Outcome Evaluation:           Progress: improving  Outcome Evaluation: pain has been improved since arriving to the floor around 1300. Recieved scheduled tylenol with no other complaints. VSS on RA. Will continue to monitor.

## 2023-01-21 NOTE — CONSULTS
[facility]    Referring Provider: No ref. provider found    Primary Care Provider: Jorje Hurtado MD    Reason for Consultation: Intractable abdominal pain    Chief complaint abdominal pain    History of present illness:  Tr Brito is a 32 y.o. male who is admitted with severe epigastric and right upper quadrant abdominal pain.'s been essentially yesterday.  He was seen at [facility].  CT there showed some mesenteric edema in the pelvis.  He had a lipase of 97 AST was 41 ALT of 60.  He denies precipitating factors.  States pain is sharp stabbing pain.  It radiates to the right upper quadrant and back.  No family history of biliary disease.  He is a social drinker last drink was 3 to 4 days ago..  Denies tobacco.  Takes occasional NSAIDs but none routine.  No prior episodes of pain. Has hx of kidney stones.  Allergies:  Patient has no known allergies.    Scheduled Meds:  acetaminophen, 1,000 mg, Oral, TID  senna-docusate sodium, 2 tablet, Oral, BID  sodium chloride, 10 mL, Intravenous, Q12H         Infusions:  lactated ringers, 100 mL/hr        PRN Meds:  •  senna-docusate sodium **AND** polyethylene glycol **AND** bisacodyl **AND** bisacodyl  •  Calcium Replacement - Initiate Nurse / BPA Driven Protocol  •  Magnesium Standard Dose Replacement - Initiate Nurse / BPA Driven Protocol  •  Morphine **AND** naloxone  •  ondansetron  •  oxyCODONE-acetaminophen  •  Phosphorus Replacement - Initiate Nurse / BPA Driven Protocol  •  Potassium Replacement - Initiate Nurse / BPA Driven Protocol  •  sodium chloride  •  sodium chloride    Home Meds:  Medications Prior to Admission   Medication Sig Dispense Refill Last Dose   • sildenafil (Viagra) 100 MG tablet Take 1 tablet by mouth Daily As Needed for Erectile Dysfunction. 30 tablet 3 Past Month   • amoxicillin-clavulanate (Augmentin) 875-125 MG per tablet Take 1 tablet by mouth 2 (Two) Times a Day. (Patient not taking: Reported on 1/21/2023)  "20 tablet 0 Not Taking   • cetirizine (zyrTEC) 10 MG tablet Take 10 mg by mouth Daily. (Patient not taking: Reported on 1/21/2023)   Not Taking   • ondansetron (Zofran) 8 MG tablet Take 1 tablet by mouth Every 8 (Eight) Hours As Needed for Nausea or Vomiting. (Patient not taking: Reported on 1/21/2023) 25 tablet 2 Not Taking       ROS: Review of Systems   Constitutional: Negative for chills, fever and unexpected weight change.   Gastrointestinal: Positive for abdominal pain and nausea. Negative for vomiting.   All other systems reviewed and are negative.      PAST MED HX: Pt  has a past medical history of Anxiety, Colitis, Depression, Erectile dysfunction, History of alcohol use disorder, and Hypertension.  PAST SURG HX: Pt  has a past surgical history that includes Nose surgery.  FAM HX: family history includes ADD / ADHD in his mother; Anemia in his mother; Arthritis in his maternal grandmother; Diabetes in his father and mother; Heart attack in his paternal grandmother; Hyperlipidemia in his father; Hypertension in his father and mother; Mental illness in his mother; Obesity in his mother.  SOC HX: Pt  reports that he has never smoked. He has never used smokeless tobacco. He reports current alcohol use. He reports that he does not use drugs.    /95 (BP Location: Right arm, Patient Position: Lying)   Pulse 76   Temp 98.3 °F (36.8 °C) (Oral)   Resp 18   Ht 185.4 cm (73\")   Wt 83.9 kg (185 lb)   SpO2 94%   BMI 24.41 kg/m²     Physical Exam  Wt Readings from Last 3 Encounters:   01/21/23 83.9 kg (185 lb)   12/28/22 84 kg (185 lb 4 oz)   09/20/22 80.7 kg (178 lb)   ,body mass index is 24.41 kg/m².    General Well developed; well nourished; no acute distress.   ENT Good dentition.  Oral mucosa pink & moist without thrush or lesions.    Neck Neck supple; trachea midline. No thyromegaly  Resp CTA; no rhonchi, rales, or wheezes.  Respiration effort normal  CV RRR; ; no M/R/G. No lower extremity edema  GI Abd " soft, tender epigastric area without rebound.    Skin No rash; no lesions; no bruises.  Skin turgor normal  MSK No clubbing; no cyanosis.    Psych Oriented to time, place, and person.  Appropriate affect      Results Review:   I reviewed the patient's new clinical results.  I reviewed the patient's new imaging results and agree with the interpretation.    Lab Results   Component Value Date    WBC 8.32 01/21/2023    HGB 14.5 01/21/2023    HCT 42.9 01/21/2023    MCV 93.9 01/21/2023     01/21/2023       Lab Results   Component Value Date    GLUCOSE 101 (H) 01/21/2023    BUN 11 01/21/2023    CREATININE 0.84 01/21/2023    EGFRIFNONA 125 12/19/2019    BCR 13.1 01/21/2023    CO2 23.0 01/21/2023    CALCIUM 8.8 01/21/2023    ALBUMIN 4.7 01/21/2023    AST 37 01/21/2023    ALT 38 01/21/2023     CT scan abdomen pelvis with contrast.  PROCEDURE:  CT of the abdomen and pelvis was performed following the intravenous administration of iodinated contrast material.  CT dose lowering techniques were used, to include: automated exposure control, adjustment for patient size, and or use of   iterative reconstruction.     FINDINGS:     Visualized lower chest: Unremarkable.     ABDOMEN:     Liver and Biliary system:  No suspicious liver lesion. No biliary ductal dilation.     Gallbladder:  Vicarious excretion of contrast versus sludge in the gallbladder. No gallbladder luminal distention or other CT findings of gallbladder inflammation.     Spleen:  Normal.     Pancreas:  Normal.     Adrenal glands:  Normal.     Kidneys and ureters:  Kidneys enhance symmetrically. No suspicious mass. Contrast excretion into the renal collecting systems and ureters limits evaluation for nonobstructing calculus.  No hydronephrosis.     Aorta/IVC: No aortic aneurysm or dissection.  IVC normal.     Lymph nodes:  No lymphadenopathy.     Gastrointestinal tract:  Stomach normal.  Small bowel nondilated.  Appendix normal. No colonic wall thickening or  dilation. There is some enteric contrast in the proximal small bowel.     Peritoneum/Mesentery: No pneumoperitoneum.  No ascites.     Abdominal wall: Unremarkable.     PELVIS:     Urinary bladder: Contains excreted contrast.     Reproductive organs: No acute abnormality by CT.     Lymph Nodes: No pelvic lymphadenopathy.     BONES:  Unremarkable.           IMPRESSION:     1.  No acute abnormality.              Electronically signed by:  Shoshana Thomas M.D.    1/21/2023 3:08 AM Mountain Time  Ultrasound gallbladder     FINDINGS:  The gallbladder demonstrates no evidence for gallstones, gallbladder wall thickening, or pericholecystic edema. There is no intrahepatic or extrahepatic biliary dilatation. The common bile duct measures approximately 5mm. No focal hepatic abnormalities   are seen. The visualized portions of the pancreatic head and proximal body are unremarkable. Screening evaluation of the right kidney demonstrates no evidence for hydronephrosis. Flow is demonstrated within the portal and hepatic veins on Doppler   evaluation.     IMPRESSION:  1.Unremarkable right upper quadrant ultrasound.     Electronically Signed: Yung Eddy    1/21/2023 8:14 AM EST    Workstation ID: [id number]      ASSESSMENTS/PLANS    1.  Abdominal pain  2.  COVID-positive    Pain sounds biliary in nature.  No evidence of pancreatitis at present.  Will plan EGD for tomorrow.  If negative needs CCK HIDA.      I discussed the patient's findings and my recommendations with patient and family    Az Abbasi MD  01/21/23  14:09 EST

## 2023-01-21 NOTE — Clinical Note
Level of Care: Telemetry [5]   Diagnosis: Intractable abdominal pain [539429]   Bed Request Comments: of note, covid 19 + so should isolate til sort out   Certification: I Certify That Inpatient Hospital Services Are Medically Necessary For Greater Than 2 Midnights

## 2023-01-22 ENCOUNTER — ANESTHESIA (OUTPATIENT)
Dept: GASTROENTEROLOGY | Facility: HOSPITAL | Age: 33
End: 2023-01-22
Payer: COMMERCIAL

## 2023-01-22 ENCOUNTER — ANESTHESIA EVENT (OUTPATIENT)
Dept: GASTROENTEROLOGY | Facility: HOSPITAL | Age: 33
End: 2023-01-22
Payer: COMMERCIAL

## 2023-01-22 LAB
ALBUMIN SERPL-MCNC: 3.7 G/DL (ref 3.5–5.2)
ALBUMIN/GLOB SERPL: 1.7 G/DL
ALP SERPL-CCNC: 56 U/L (ref 39–117)
ALT SERPL W P-5'-P-CCNC: 31 U/L (ref 1–41)
ANION GAP SERPL CALCULATED.3IONS-SCNC: 8 MMOL/L (ref 5–15)
AST SERPL-CCNC: 27 U/L (ref 1–40)
BASOPHILS # BLD AUTO: 0.03 10*3/MM3 (ref 0–0.2)
BASOPHILS NFR BLD AUTO: 0.5 % (ref 0–1.5)
BILIRUB SERPL-MCNC: 0.6 MG/DL (ref 0–1.2)
BUN SERPL-MCNC: 9 MG/DL (ref 6–20)
BUN/CREAT SERPL: 11 (ref 7–25)
CALCIUM SPEC-SCNC: 8.3 MG/DL (ref 8.6–10.5)
CHLORIDE SERPL-SCNC: 106 MMOL/L (ref 98–107)
CO2 SERPL-SCNC: 27 MMOL/L (ref 22–29)
CREAT SERPL-MCNC: 0.82 MG/DL (ref 0.76–1.27)
DEPRECATED RDW RBC AUTO: 42 FL (ref 37–54)
EGFRCR SERPLBLD CKD-EPI 2021: 121.2 ML/MIN/1.73
EOSINOPHIL # BLD AUTO: 0.02 10*3/MM3 (ref 0–0.4)
EOSINOPHIL NFR BLD AUTO: 0.4 % (ref 0.3–6.2)
ERYTHROCYTE [DISTWIDTH] IN BLOOD BY AUTOMATED COUNT: 12.2 % (ref 12.3–15.4)
GLOBULIN UR ELPH-MCNC: 2.2 GM/DL
GLUCOSE SERPL-MCNC: 86 MG/DL (ref 65–99)
HCT VFR BLD AUTO: 38.7 % (ref 37.5–51)
HGB BLD-MCNC: 13.4 G/DL (ref 13–17.7)
IMM GRANULOCYTES # BLD AUTO: 0.02 10*3/MM3 (ref 0–0.05)
IMM GRANULOCYTES NFR BLD AUTO: 0.4 % (ref 0–0.5)
LYMPHOCYTES # BLD AUTO: 2.67 10*3/MM3 (ref 0.7–3.1)
LYMPHOCYTES NFR BLD AUTO: 48.3 % (ref 19.6–45.3)
MAGNESIUM SERPL-MCNC: 2.2 MG/DL (ref 1.6–2.6)
MCH RBC QN AUTO: 32.1 PG (ref 26.6–33)
MCHC RBC AUTO-ENTMCNC: 34.6 G/DL (ref 31.5–35.7)
MCV RBC AUTO: 92.8 FL (ref 79–97)
MONOCYTES # BLD AUTO: 0.3 10*3/MM3 (ref 0.1–0.9)
MONOCYTES NFR BLD AUTO: 5.4 % (ref 5–12)
NEUTROPHILS NFR BLD AUTO: 2.49 10*3/MM3 (ref 1.7–7)
NEUTROPHILS NFR BLD AUTO: 45 % (ref 42.7–76)
NRBC BLD AUTO-RTO: 0 /100 WBC (ref 0–0.2)
PHOSPHATE SERPL-MCNC: 1.8 MG/DL (ref 2.5–4.5)
PHOSPHATE SERPL-MCNC: 2.7 MG/DL (ref 2.5–4.5)
PLATELET # BLD AUTO: 234 10*3/MM3 (ref 140–450)
PMV BLD AUTO: 10.1 FL (ref 6–12)
POTASSIUM SERPL-SCNC: 4.7 MMOL/L (ref 3.5–5.2)
PROT SERPL-MCNC: 5.9 G/DL (ref 6–8.5)
RBC # BLD AUTO: 4.17 10*6/MM3 (ref 4.14–5.8)
SODIUM SERPL-SCNC: 141 MMOL/L (ref 136–145)
WBC NRBC COR # BLD: 5.53 10*3/MM3 (ref 3.4–10.8)

## 2023-01-22 PROCEDURE — 84100 ASSAY OF PHOSPHORUS: CPT | Performed by: INTERNAL MEDICINE

## 2023-01-22 PROCEDURE — 84100 ASSAY OF PHOSPHORUS: CPT | Performed by: STUDENT IN AN ORGANIZED HEALTH CARE EDUCATION/TRAINING PROGRAM

## 2023-01-22 PROCEDURE — 99232 SBSQ HOSP IP/OBS MODERATE 35: CPT | Performed by: INTERNAL MEDICINE

## 2023-01-22 PROCEDURE — G0378 HOSPITAL OBSERVATION PER HR: HCPCS

## 2023-01-22 PROCEDURE — 25010000002 HYDRALAZINE PER 20 MG: Performed by: NURSE PRACTITIONER

## 2023-01-22 PROCEDURE — 85025 COMPLETE CBC W/AUTO DIFF WBC: CPT | Performed by: STUDENT IN AN ORGANIZED HEALTH CARE EDUCATION/TRAINING PROGRAM

## 2023-01-22 PROCEDURE — 80053 COMPREHEN METABOLIC PANEL: CPT | Performed by: STUDENT IN AN ORGANIZED HEALTH CARE EDUCATION/TRAINING PROGRAM

## 2023-01-22 PROCEDURE — 43239 EGD BIOPSY SINGLE/MULTIPLE: CPT | Performed by: INTERNAL MEDICINE

## 2023-01-22 PROCEDURE — 88305 TISSUE EXAM BY PATHOLOGIST: CPT | Performed by: INTERNAL MEDICINE

## 2023-01-22 PROCEDURE — 25010000002 PROPOFOL 10 MG/ML EMULSION: Performed by: ANESTHESIOLOGY

## 2023-01-22 PROCEDURE — 83735 ASSAY OF MAGNESIUM: CPT | Performed by: STUDENT IN AN ORGANIZED HEALTH CARE EDUCATION/TRAINING PROGRAM

## 2023-01-22 RX ORDER — HYDRALAZINE HYDROCHLORIDE 20 MG/ML
10 INJECTION INTRAMUSCULAR; INTRAVENOUS ONCE
Status: COMPLETED | OUTPATIENT
Start: 2023-01-22 | End: 2023-01-22

## 2023-01-22 RX ORDER — FENTANYL CITRATE 50 UG/ML
50 INJECTION, SOLUTION INTRAMUSCULAR; INTRAVENOUS
Status: DISCONTINUED | OUTPATIENT
Start: 2023-01-22 | End: 2023-01-22 | Stop reason: HOSPADM

## 2023-01-22 RX ORDER — HYDROMORPHONE HYDROCHLORIDE 1 MG/ML
0.5 INJECTION, SOLUTION INTRAMUSCULAR; INTRAVENOUS; SUBCUTANEOUS
Status: DISCONTINUED | OUTPATIENT
Start: 2023-01-22 | End: 2023-01-22 | Stop reason: HOSPADM

## 2023-01-22 RX ORDER — PROPOFOL 10 MG/ML
VIAL (ML) INTRAVENOUS AS NEEDED
Status: DISCONTINUED | OUTPATIENT
Start: 2023-01-22 | End: 2023-01-22 | Stop reason: SURG

## 2023-01-22 RX ORDER — LIDOCAINE HYDROCHLORIDE 10 MG/ML
INJECTION, SOLUTION EPIDURAL; INFILTRATION; INTRACAUDAL; PERINEURAL AS NEEDED
Status: DISCONTINUED | OUTPATIENT
Start: 2023-01-22 | End: 2023-01-22 | Stop reason: SURG

## 2023-01-22 RX ADMIN — MULTIVITAMIN TABLET 1 TABLET: TABLET at 14:16

## 2023-01-22 RX ADMIN — THIAMINE HCL TAB 100 MG 100 MG: 100 TAB at 14:14

## 2023-01-22 RX ADMIN — PROPOFOL 150 MG: 10 INJECTION, EMULSION INTRAVENOUS at 13:08

## 2023-01-22 RX ADMIN — HYDRALAZINE HYDROCHLORIDE 10 MG: 20 INJECTION INTRAMUSCULAR; INTRAVENOUS at 21:38

## 2023-01-22 RX ADMIN — Medication 10 ML: at 21:38

## 2023-01-22 RX ADMIN — LIDOCAINE HYDROCHLORIDE 40 MG: 10 INJECTION, SOLUTION EPIDURAL; INFILTRATION; INTRACAUDAL; PERINEURAL at 13:08

## 2023-01-22 RX ADMIN — SENNOSIDES AND DOCUSATE SODIUM 2 TABLET: 50; 8.6 TABLET ORAL at 21:39

## 2023-01-22 RX ADMIN — SODIUM PHOSPHATE, MONOBASIC, MONOHYDRATE 15 MMOL: 276; 142 INJECTION, SOLUTION INTRAVENOUS at 08:32

## 2023-01-22 RX ADMIN — SODIUM CHLORIDE, POTASSIUM CHLORIDE, SODIUM LACTATE AND CALCIUM CHLORIDE 100 ML/HR: 600; 310; 30; 20 INJECTION, SOLUTION INTRAVENOUS at 11:45

## 2023-01-22 RX ADMIN — SENNOSIDES AND DOCUSATE SODIUM 2 TABLET: 50; 8.6 TABLET ORAL at 14:14

## 2023-01-22 RX ADMIN — ACETAMINOPHEN 1000 MG: 500 TABLET, FILM COATED ORAL at 14:15

## 2023-01-22 RX ADMIN — PROPOFOL 30 MG: 10 INJECTION, EMULSION INTRAVENOUS at 13:14

## 2023-01-22 RX ADMIN — FOLIC ACID 1 MG: 1 TABLET ORAL at 14:14

## 2023-01-22 RX ADMIN — Medication 10 ML: at 08:33

## 2023-01-22 RX ADMIN — ACETAMINOPHEN 1000 MG: 500 TABLET, FILM COATED ORAL at 21:39

## 2023-01-22 NOTE — PROGRESS NOTES
[facility] Medicine Services  PROGRESS NOTE    Patient Name: Tr Brito  : 1990  MRN: [id number]    Date of Admission: 2023  Primary Care Physician: Jorje Hurtado MD    Subjective   Subjective     CC:  F/u abdominal pain    HPI:  Patient reports his pain is much improved this morning. No further nausea    ROS:  Gen- No fevers, chills  CV- No chest pain, palpitations  Resp- No cough, dyspnea  GI- No N/V/D, abd pain    Objective   Objective     Vital Signs:   Temp:  [97 °F (36.1 °C)-98.3 °F (36.8 °C)] 97.2 °F (36.2 °C)  Heart Rate:  [51-76] 54  Resp:  [16-18] 16  BP: (114-146)/() 137/94     Physical Exam:  Constitutional: No acute distress, awake, alert  HENT: NCAT, mucous membranes moist  Respiratory: Clear to auscultation bilaterally, respiratory effort normal   Cardiovascular: RRR, no murmurs, rubs, or gallops  Gastrointestinal: Positive bowel sounds, soft, nontender, nondistended  Musculoskeletal: No bilateral ankle edema  Psychiatric: Appropriate affect, cooperative  Neurologic: Oriented x 3, strength symmetric in all extremities, Cranial Nerves grossly intact to confrontation, speech clear  Skin: No rashes    Results Reviewed:  LAB RESULTS:      Lab 23  0409   WBC 5.53 8.32   HEMOGLOBIN 13.4 14.5   HEMATOCRIT 38.7 42.9   PLATELETS 234 284   NEUTROS ABS 2.49 7.10*   IMMATURE GRANS (ABS) 0.02 0.02   LYMPHS ABS 2.67 0.89   MONOS ABS 0.30 0.29   EOS ABS 0.02 0.00   MCV 92.8 93.9   LACTATE  --  0.9         Lab 2333 23  0409   SODIUM 141  --  138   POTASSIUM 4.7  --  4.1   CHLORIDE 106  --  101   CO2 27.0  --  23.0   ANION GAP 8.0  --  14.0   BUN 9  --  11   CREATININE 0.82  --  0.84   EGFR 121.2  --  120.3   GLUCOSE 86  --  101*   CALCIUM 8.3*  --  8.8   MAGNESIUM 2.2 1.7 1.5*   PHOSPHORUS 1.8*  --  2.6         Lab 23  0633 23  0409 23  1740   TOTAL PROTEIN 5.9* 7.2  --    ALBUMIN 3.7 4.7  --     GLOBULIN 2.2 2.5  --    ALT (SGPT) 31 38  --    AST (SGOT) 27 37  --    BILIRUBIN 0.6 0.5  --    ALK PHOS 56 73  --    LIPASE  --  26 97                     Brief Urine Lab Results  (Last result in the past 365 days)      Color   Clarity   Blood   Leuk Est   Nitrite   Protein   CREAT   Urine HCG        01/21/23 0520 Yellow   Clear   Negative   Negative   Negative   Negative                 Microbiology Results Abnormal     None          CT Abdomen Pelvis Without Contrast    Result Date: 1/20/2023  CT OF THE ABDOMEN AND PELVIS WITHOUT CONTRAST HISTORY: Right flank pain PROCEDURE:  Routine axial images were obtained from the lung bases to the pubic symphysis.  No contrast was given. This study was performed with techniques to keep radiation doses as low as reasonably achievable, (ALARA). Individualized dose reduction techniques using automated exposure control or adjustment of mA and/or kV according to the patient size were employed. COMPARISON: None. FINDINGS: Abdomen: The gallbladder, solid abdominal organs and ureters are normal. The GI tract is unremarkable, including the appendix. Pelvis: The urinary bladder is normal. There is no pelvic or abdominal ascites, adenopathy, or acute osseous abnormality.    Impression: Unremarkable. Images reviewed, interpreted and dictated by Dr. Andrew Glez MD    CT Abdomen Pelvis With Contrast    Result Date: 1/21/2023  EXAMINATION:  CT ABDOMEN PELVIS W CONTRAST DATE: 1/21/2023 4:34 AM INDICATION: Severe epigastric and right upper quadrant pain, intractable nausea and vomiting ; COMPARISON: None. PROCEDURE:  CT of the abdomen and pelvis was performed following the intravenous administration of iodinated contrast material.  CT dose lowering techniques were used, to include: automated exposure control, adjustment for patient size, and or use of iterative reconstruction. FINDINGS: Visualized lower chest: Unremarkable.  ABDOMEN: Liver and Biliary system:  No suspicious liver  lesion. No biliary ductal dilation. Gallbladder:  Vicarious excretion of contrast versus sludge in the gallbladder. No gallbladder luminal distention or other CT findings of gallbladder inflammation. Spleen:  Normal. Pancreas:  Normal. Adrenal glands:  Normal. Kidneys and ureters:  Kidneys enhance symmetrically. No suspicious mass. Contrast excretion into the renal collecting systems and ureters limits evaluation for nonobstructing calculus.  No hydronephrosis. Aorta/IVC: No aortic aneurysm or dissection.  IVC normal. Lymph nodes:  No lymphadenopathy. Gastrointestinal tract:  Stomach normal.  Small bowel nondilated.  Appendix normal. No colonic wall thickening or dilation. There is some enteric contrast in the proximal small bowel. Peritoneum/Mesentery: No pneumoperitoneum.  No ascites. Abdominal wall: Unremarkable. PELVIS: Urinary bladder: Contains excreted contrast. Reproductive organs: No acute abnormality by CT. Lymph Nodes: No pelvic lymphadenopathy. BONES:  Unremarkable.     Impression:  1.  No acute abnormality. Electronically signed by:  Shoshana Thomas M.D.  1/21/2023 3:08 AM Mountain Time    CT Abdomen Pelvis With Contrast    Result Date: 1/20/2023  CT SCAN OF THE ABDOMEN AND PELVIS WITH CONTRAST    1/20/2023 9:14 PM HISTORY: Epigastric pain. COMPARISON: Same day. PROCEDURE: The patient was injected with IV contrast. Oral contrast was administered. Axial images were obtained from the lung bases to the pubic symphysis by computed tomography. This study was performed with techniques to keep radiation doses as low as reasonably achievable, (ALARA). Individualized dose reduction techniques using automated exposure control or adjustment of mA and/or kV according to the patient size were employed. FINDINGS: ABDOMEN: The lung bases are clear. The gallbladder is unremarkable. The solid organs are otherwise unremarkable. There is no free fluid or adenopathy. PELVIS: The appendix is normal. The urinary bladder is  mildly distended. There is no free fluid or adenopathy. There is subtle edema in the mesentery.    Impression: Interval development of mesenteric edema in the pelvis. This is of uncertain etiology. Images reviewed, interpreted, and dictated by LANIE Muñoz MD    US Gallbladder    Result Date: 1/21/2023  US GALLBLADDER Date of Exam: 1/21/2023 7:16 AM EST Indication: severe RUQ pain,. Comparison: No comparisons available. Technique: Grayscale and color Doppler ultrasound evaluation of the right upper quadrant was performed. FINDINGS: The gallbladder demonstrates no evidence for gallstones, gallbladder wall thickening, or pericholecystic edema. There is no intrahepatic or extrahepatic biliary dilatation. The common bile duct measures approximately 5mm. No focal hepatic abnormalities are seen. The visualized portions of the pancreatic head and proximal body are unremarkable. Screening evaluation of the right kidney demonstrates no evidence for hydronephrosis. Flow is demonstrated within the portal and hepatic veins on Doppler evaluation.     Impression: 1.Unremarkable right upper quadrant ultrasound. Electronically Signed: Yung Surjit  1/21/2023 8:14 AM EST  Workstation ID: [id number]          I have reviewed the medications:  Scheduled Meds:acetaminophen, 1,000 mg, Oral, TID  thiamine, 100 mg, Oral, Daily   And  multivitamin, 1 tablet, Oral, Daily   And  folic acid, 1 mg, Oral, Daily  senna-docusate sodium, 2 tablet, Oral, BID  sodium chloride, 10 mL, Intravenous, Q12H      Continuous Infusions:lactated ringers, 100 mL/hr, Last Rate: 100 mL/hr (01/22/23 1145)      PRN Meds:.•  senna-docusate sodium **AND** polyethylene glycol **AND** bisacodyl **AND** bisacodyl  •  Calcium Replacement - Initiate Nurse / BPA Driven Protocol  •  Magnesium Standard Dose Replacement - Initiate Nurse / BPA Driven Protocol  •  Morphine **AND** naloxone  •  ondansetron  •  oxyCODONE-acetaminophen  •  Phosphorus Replacement -  Initiate Nurse / BPA Driven Protocol  •  Potassium Replacement - Initiate Nurse / BPA Driven Protocol  •  sodium chloride  •  sodium chloride    Assessment & Plan   Assessment & Plan     Active Hospital Problems    Diagnosis  POA   • **Intractable abdominal pain [R10.9]  Yes      Resolved Hospital Problems   No resolved problems to display.        Brief Hospital Course to date:  Tr Brito is a 32 y.o. male with anxiety, mild depression, erectile dysfunction, alcoholism, prior possible alcohol withdrawal, HTN, who presented for evaluation of intractable abdominal pain     Intractable abdominal pain  -GI consulted, appreciate recs  -Plan for EGD this morning, if negative will need HIDA w/CCK  -PRN pain and nausea control      Anxiety  Depression  HTN  Alcohol use disorder - reportedly in remission  Erectile dysfunction  -Patient not currently taking any medications; monitor BP and start anti-hypertensives if persistently elevated.   -Denied any current EtOH withdrawal, denies history of EtOH withdrawal (despite outpatient PCP notes); will place on CIWA with vitamins without ativan (nursing to call if scoring on CIWA)    Expected Discharge Location and Transportation: Home  Expected Discharge   Expected Discharge Date and Time     Expected Discharge Date Expected Discharge Time    Jan 23, 2023            DVT prophylaxis:  Mechanical DVT prophylaxis orders are present.          CODE STATUS:   Code Status and Medical Interventions:   Ordered at: 01/21/23 1324     Level Of Support Discussed With:    Patient     Code Status (Patient has no pulse and is not breathing):    CPR (Attempt to Resuscitate)     Medical Interventions (Patient has pulse or is breathing):    Full Support     Release to patient:    Routine Release       Jacque Tomas,   01/22/23

## 2023-01-22 NOTE — ANESTHESIA PREPROCEDURE EVALUATION
Anesthesia Evaluation     Patient summary reviewed and Nursing notes reviewed   no history of anesthetic complications:  NPO Solid Status: > 8 hours  NPO Liquid Status: > 2 hours           Airway   Mallampati: II  TM distance: >3 FB  Neck ROM: full  No difficulty expected  Dental - normal exam     Pulmonary     breath sounds clear to auscultation  Cardiovascular   Exercise tolerance: excellent (>7 METS)    Rhythm: regular  Rate: normal    (+) hypertension well controlled less than 2 medications,       Neuro/Psych  (+) psychiatric history Anxiety and Depression,    GI/Hepatic/Renal/Endo      Musculoskeletal     Abdominal   (-) obese    Abdomen: soft.   Substance History   (+) alcohol use,      OB/GYN          Other                      Anesthesia Plan    ASA 2 - emergent     general     intravenous induction     Anesthetic plan, risks, benefits, and alternatives have been provided, discussed and informed consent has been obtained with: patient.        CODE STATUS:    Level Of Support Discussed With: Patient  Code Status (Patient has no pulse and is not breathing): CPR (Attempt to Resuscitate)  Medical Interventions (Patient has pulse or is breathing): Full Support  Release to patient: Routine Release

## 2023-01-22 NOTE — ANESTHESIA POSTPROCEDURE EVALUATION
Patient: Tr Brito    Procedure Summary     Date: 01/22/23 Room / Location: [facility] END[facility]COPY 2 /  [facility]    Anesthesia Start: 1305 Anesthesia Stop: 1329    Procedure: ESOPHAGOGASTRODUODENOSCOPY Diagnosis:     Surgeons: Az Abbasi MD Provider: Krishna Gardiner MD    Anesthesia Type: general ASA Status: 2 - Emergent          Anesthesia Type: general    Vitals  Vitals Value Taken Time   BP     Temp     Pulse 45 01/22/23 1328   Resp     SpO2 100 % 01/22/23 1328   Vitals shown include unvalidated device data.        Post Anesthesia Care and Evaluation    Patient location during evaluation: PACU  Patient participation: complete - patient participated  Level of consciousness: awake and alert  Pain management: adequate    Airway patency: patent  Anesthetic complications: No anesthetic complications  PONV Status: none  Cardiovascular status: hemodynamically stable and acceptable  Respiratory status: nonlabored ventilation, acceptable and nasal cannula  Hydration status: acceptable

## 2023-01-22 NOTE — ED PROVIDER NOTES
EMERGENCY DEPARTMENT ENCOUNTER    Pt Name: Tr Brito  MRN: [id number]  Pt :   1990  Room Number:  N642/1  Date of encounter:  2023  PCP: Jorje Hurtado MD  ED Provider: Hay Granados MD    Historian: Patient      HPI:  Chief Complaint: Epigastric pain, nausea, vomiting        Context: Tr Brito is a 32-year-old male who presents because of severe epigastric and right upper quadrant pain with associated nausea and vomiting that is been present since around noon yesterday.  He said he had not just eaten before the pain started.  He was at rest when he had severe sharp epigastric pain.  He has had nausea and yellow emesis.  The pain has both constant and colicky components to it he has a certain baseline level of pain no matter what but then it comes in severe waves.  He currently rates his pain as severe.  No appreciated fevers or systemic symptoms.  He has not had a bowel movement since the pain started.  He denies dysuria.  He was seen at an outside hospital earlier yesterday evening where full work-up was negative and he says his pain ultimately were resolved with Dilaudid and Phenergan.  No other complaints at this time.    PAST MEDICAL HISTORY  Past Medical History:   Diagnosis Date   • Anxiety    • Colitis    • Depression    • Erectile dysfunction    • History of alcohol use disorder    • Hypertension          PAST SURGICAL HISTORY  Past Surgical History:   Procedure Laterality Date   • NOSE SURGERY      fracture         FAMILY HISTORY  Family History   Problem Relation Age of Onset   • ADD / ADHD Mother    • Anemia Mother    • Diabetes Mother    • Hypertension Mother    • Mental illness Mother    • Obesity Mother    • Diabetes Father    • Hypertension Father    • Hyperlipidemia Father    • Arthritis Maternal Grandmother    • Heart attack Paternal Grandmother          SOCIAL HISTORY  Social History     Socioeconomic History   • Marital status: Single   Tobacco Use   •  Smoking status: Never   • Smokeless tobacco: Never   Substance and Sexual Activity   • Alcohol use: Yes   • Drug use: No         ALLERGIES  Patient has no known allergies.        REVIEW OF SYSTEMS  Review of Systems     All systems reviewed and negative except for those discussed in HPI.       PHYSICAL EXAM    I have reviewed the triage vital signs and nursing notes.    ED Triage Vitals   Temp Heart Rate Resp BP SpO2   01/21/23 0156 01/21/23 0156 01/21/23 0158 01/21/23 0156 01/21/23 0156   97.6 °F (36.4 °C) 71 18 141/94 96 %      Temp src Heart Rate Source Patient Position BP Location FiO2 (%)   01/21/23 0156 01/21/23 0156 01/21/23 0156 01/21/23 0156 --   Oral Monitor Sitting Left arm        Physical Exam  GENERAL:   Appears shaking, moaning, difficult to console  HENT: Nares patent.  Mildly dry mucous membranes  EYES: No scleral icterus.  CV: Regular rhythm, regular rate.  RESPIRATORY: Normal effort.  No audible wheezes, rales or rhonchi.  ABDOMEN: Soft, n endorses tenderness to palpation in the epigastrium and right upper quadrant  MUSCULOSKELETAL: No deformities.   NEURO: Alert, moves all extremities, follows commands.  SKIN: Warm, dry, no rash visualized.      LAB RESULTS  Recent Results (from the past 24 hour(s))   Comprehensive Metabolic Panel    Collection Time: 01/21/23  4:09 AM    Specimen: Blood   Result Value Ref Range    Glucose 101 (H) 65 - 99 mg/dL    BUN 11 6 - 20 mg/dL    Creatinine 0.84 0.76 - 1.27 mg/dL    Sodium 138 136 - 145 mmol/L    Potassium 4.1 3.5 - 5.2 mmol/L    Chloride 101 98 - 107 mmol/L    CO2 23.0 22.0 - 29.0 mmol/L    Calcium 8.8 8.6 - 10.5 mg/dL    Total Protein 7.2 6.0 - 8.5 g/dL    Albumin 4.7 3.5 - 5.2 g/dL    ALT (SGPT) 38 1 - 41 U/L    AST (SGOT) 37 1 - 40 U/L    Alkaline Phosphatase 73 39 - 117 U/L    Total Bilirubin 0.5 0.0 - 1.2 mg/dL    Globulin 2.5 gm/dL    A/G Ratio 1.9 g/dL    BUN/Creatinine Ratio 13.1 7.0 - 25.0    Anion Gap 14.0 5.0 - 15.0 mmol/L    eGFR 120.3 >60.0  mL/min/1.73   Lipase    Collection Time: 01/21/23  4:09 AM    Specimen: Blood   Result Value Ref Range    Lipase 26 13 - 60 U/L   Lactic Acid, Plasma    Collection Time: 01/21/23  4:09 AM    Specimen: Blood   Result Value Ref Range    Lactate 0.9 0.5 - 2.0 mmol/L   Magnesium    Collection Time: 01/21/23  4:09 AM    Specimen: Blood   Result Value Ref Range    Magnesium 1.5 (L) 1.6 - 2.6 mg/dL   Phosphorus    Collection Time: 01/21/23  4:09 AM    Specimen: Blood   Result Value Ref Range    Phosphorus 2.6 2.5 - 4.5 mg/dL   CBC Auto Differential    Collection Time: 01/21/23  4:09 AM    Specimen: Blood   Result Value Ref Range    WBC 8.32 3.40 - 10.80 10*3/mm3    RBC 4.57 4.14 - 5.80 10*6/mm3    Hemoglobin 14.5 13.0 - 17.7 g/dL    Hematocrit 42.9 37.5 - 51.0 %    MCV 93.9 79.0 - 97.0 fL    MCH 31.7 26.6 - 33.0 pg    MCHC 33.8 31.5 - 35.7 g/dL    RDW 11.9 (L) 12.3 - 15.4 %    RDW-SD 41.2 37.0 - 54.0 fl    MPV 9.9 6.0 - 12.0 fL    Platelets 284 140 - 450 10*3/mm3    Neutrophil % 85.4 (H) 42.7 - 76.0 %    Lymphocyte % 10.7 (L) 19.6 - 45.3 %    Monocyte % 3.5 (L) 5.0 - 12.0 %    Eosinophil % 0.0 (L) 0.3 - 6.2 %    Basophil % 0.2 0.0 - 1.5 %    Immature Grans % 0.2 0.0 - 0.5 %    Neutrophils, Absolute 7.10 (H) 1.70 - 7.00 10*3/mm3    Lymphocytes, Absolute 0.89 0.70 - 3.10 10*3/mm3    Monocytes, Absolute 0.29 0.10 - 0.90 10*3/mm3    Eosinophils, Absolute 0.00 0.00 - 0.40 10*3/mm3    Basophils, Absolute 0.02 0.00 - 0.20 10*3/mm3    Immature Grans, Absolute 0.02 0.00 - 0.05 10*3/mm3    nRBC 0.0 0.0 - 0.2 /100 WBC   COVID-19, FLU A/B, RSV PCR - Swab, Nasopharynx    Collection Time: 01/21/23  4:26 AM    Specimen: Nasopharynx; Swab   Result Value Ref Range    COVID19 Detected (C) Not Detected - Ref. Range    Influenza A PCR Not Detected Not Detected    Influenza B PCR Not Detected Not Detected    RSV, PCR Not Detected Not Detected   Urinalysis With Microscopic If Indicated (No Culture) - Urine, Clean Catch    Collection Time:  01/21/23  5:20 AM    Specimen: Urine, Clean Catch   Result Value Ref Range    Color, UA Yellow Yellow, Straw    Appearance, UA Clear Clear    pH, UA 5.5 5.0 - 8.0    Specific Gravity, UA 1.068 (H) 1.001 - 1.030    Glucose, UA Negative Negative    Ketones, UA 15 mg/dL (1+) (A) Negative    Bilirubin, UA Negative Negative    Blood, UA Negative Negative    Protein, UA Negative Negative    Leuk Esterase, UA Negative Negative    Nitrite, UA Negative Negative    Urobilinogen, UA 0.2 E.U./dL 0.2 - 1.0 E.U./dL   Urine Drug Screen - Urine, Clean Catch    Collection Time: 01/21/23  5:20 AM    Specimen: Urine, Clean Catch   Result Value Ref Range    THC, Screen, Urine Negative Negative    Phencyclidine (PCP), Urine Negative Negative    Cocaine Screen, Urine Negative Negative    Methamphetamine, Ur Negative Negative    Opiate Screen Positive (A) Negative    Amphetamine Screen, Urine Negative Negative    Benzodiazepine Screen, Urine Negative Negative    Tricyclic Antidepressants Screen Negative Negative    Methadone Screen, Urine Negative Negative    Barbiturates Screen, Urine Negative Negative    Oxycodone Screen, Urine Negative Negative    Propoxyphene Screen Negative Negative    Buprenorphine, Screen, Urine Negative Negative   Magnesium    Collection Time: 01/21/23  8:08 PM    Specimen: Blood   Result Value Ref Range    Magnesium 1.7 1.6 - 2.6 mg/dL       If labs were ordered, I independently reviewed the results and considered them in treating the patient.        RADIOLOGY  CT Abdomen Pelvis With Contrast    Result Date: 1/21/2023  EXAMINATION:  CT ABDOMEN PELVIS W CONTRAST DATE: 1/21/2023 4:34 AM INDICATION: Severe epigastric and right upper quadrant pain, intractable nausea and vomiting ; COMPARISON: None. PROCEDURE:  CT of the abdomen and pelvis was performed following the intravenous administration of iodinated contrast material.  CT dose lowering techniques were used, to include: automated exposure control, adjustment  for patient size, and or use of iterative reconstruction. FINDINGS: Visualized lower chest: Unremarkable.  ABDOMEN: Liver and Biliary system:  No suspicious liver lesion. No biliary ductal dilation. Gallbladder:  Vicarious excretion of contrast versus sludge in the gallbladder. No gallbladder luminal distention or other CT findings of gallbladder inflammation. Spleen:  Normal. Pancreas:  Normal. Adrenal glands:  Normal. Kidneys and ureters:  Kidneys enhance symmetrically. No suspicious mass. Contrast excretion into the renal collecting systems and ureters limits evaluation for nonobstructing calculus.  No hydronephrosis. Aorta/IVC: No aortic aneurysm or dissection.  IVC normal. Lymph nodes:  No lymphadenopathy. Gastrointestinal tract:  Stomach normal.  Small bowel nondilated.  Appendix normal. No colonic wall thickening or dilation. There is some enteric contrast in the proximal small bowel. Peritoneum/Mesentery: No pneumoperitoneum.  No ascites. Abdominal wall: Unremarkable. PELVIS: Urinary bladder: Contains excreted contrast. Reproductive organs: No acute abnormality by CT. Lymph Nodes: No pelvic lymphadenopathy. BONES:  Unremarkable.      1.  No acute abnormality. Electronically signed by:  Shoshana Thomas M.D.  1/21/2023 3:08 AM Mountain Time    CT Abdomen Pelvis With Contrast    Result Date: 1/20/2023  CT SCAN OF THE ABDOMEN AND PELVIS WITH CONTRAST    1/20/2023 9:14 PM HISTORY: Epigastric pain. COMPARISON: Same day. PROCEDURE: The patient was injected with IV contrast. Oral contrast was administered. Axial images were obtained from the lung bases to the pubic symphysis by computed tomography. This study was performed with techniques to keep radiation doses as low as reasonably achievable, (ALARA). Individualized dose reduction techniques using automated exposure control or adjustment of mA and/or kV according to the patient size were employed. FINDINGS: ABDOMEN: The lung bases are clear. The gallbladder is  unremarkable. The solid organs are otherwise unremarkable. There is no free fluid or adenopathy. PELVIS: The appendix is normal. The urinary bladder is mildly distended. There is no free fluid or adenopathy. There is subtle edema in the mesentery.    Interval development of mesenteric edema in the pelvis. This is of uncertain etiology. Images reviewed, interpreted, and dictated by LANIE Muñoz MD    US Gallbladder    Result Date: 1/21/2023  US GALLBLADDER Date of Exam: 1/21/2023 7:16 AM EST Indication: severe RUQ pain,. Comparison: No comparisons available. Technique: Grayscale and color Doppler ultrasound evaluation of the right upper quadrant was performed. FINDINGS: The gallbladder demonstrates no evidence for gallstones, gallbladder wall thickening, or pericholecystic edema. There is no intrahepatic or extrahepatic biliary dilatation. The common bile duct measures approximately 5mm. No focal hepatic abnormalities are seen. The visualized portions of the pancreatic head and proximal body are unremarkable. Screening evaluation of the right kidney demonstrates no evidence for hydronephrosis. Flow is demonstrated within the portal and hepatic veins on Doppler evaluation.     1.Unremarkable right upper quadrant ultrasound. Electronically Signed: Yung Eddy  1/21/2023 8:14 AM EST  Workstation ID: [id number]      I ordered and independently reviewed the above noted radiographic studies.      I viewed images of CT scan of the abdomen pelvis which does not reveal any acute abnormalities that I can appreciate.  Ultrasound of the gallbladder which does not show any gallstones inflammation or other abnormalities that I can appreciate.    See radiologist's dictation for official interpretation.        PROCEDURES    Procedures    ECG 12 Lead QT Measurement    (Results Pending)       MEDICATIONS GIVEN IN ER    Medications   sodium chloride 0.9 % flush 10 mL (10 mL Intravenous Given 1/21/23 2015)   sodium chloride  0.9 % flush 10 mL (has no administration in time range)   sodium chloride 0.9 % infusion 40 mL (has no administration in time range)   lactated ringers infusion (100 mL/hr Intravenous New Bag 1/21/23 1410)   acetaminophen (TYLENOL) tablet 1,000 mg (1,000 mg Oral Given 1/21/23 2015)   oxyCODONE-acetaminophen (PERCOCET) 5-325 MG per tablet 1 tablet (has no administration in time range)   morphine injection 1 mg (1 mg Intravenous Given 1/21/23 1105)     And   naloxone (NARCAN) injection 0.4 mg (has no administration in time range)   Potassium Replacement - Initiate Nurse / BPA Driven Protocol (has no administration in time range)   Magnesium Standard Dose Replacement - Initiate Nurse / BPA Driven Protocol (has no administration in time range)   Phosphorus Replacement - Initiate Nurse / BPA Driven Protocol (has no administration in time range)   Calcium Replacement - Initiate Nurse / BPA Driven Protocol (has no administration in time range)   sennosides-docusate (PERICOLACE) 8.6-50 MG per tablet 2 tablet (2 tablets Oral Not Given 1/21/23 2046)     And   polyethylene glycol (MIRALAX) packet 17 g (has no administration in time range)     And   bisacodyl (DULCOLAX) EC tablet 5 mg (has no administration in time range)     And   bisacodyl (DULCOLAX) suppository 10 mg (has no administration in time range)   ondansetron (ZOFRAN) injection 4 mg (has no administration in time range)   thiamine (VITAMIN B-1) tablet 100 mg (100 mg Oral Given 1/21/23 1719)     And   multivitamin (THERAGRAN) tablet 1 tablet (has no administration in time range)     And   folic acid (FOLVITE) tablet 1 mg (has no administration in time range)   magnesium sulfate 4g/100mL (PREMIX) infusion (has no administration in time range)   lactated ringers bolus 1,500 mL (0 mL Intravenous Stopped 1/21/23 0755)   ondansetron (ZOFRAN) injection 4 mg (4 mg Intravenous Given 1/21/23 6328)   ketorolac (TORADOL) injection 15 mg (15 mg Intravenous Given 1/21/23 0418)    HYDROmorphone (DILAUDID) injection 0.5 mg (0.5 mg Intravenous Given 1/21/23 0418)   iopamidol (ISOVUE-300) 61 % injection 100 mL (100 mL Intravenous Given 1/21/23 0435)   HYDROmorphone (DILAUDID) injection 0.25 mg (0.25 mg Intravenous Given 1/21/23 9158)   aluminum-magnesium hydroxide-simethicone (MAALOX MAX) 400-400-40 MG/5ML suspension 15 mL (15 mL Oral Given 1/21/23 0541)   Lidocaine Viscous HCl (XYLOCAINE) 2 % solution 15 mL (15 mL Mouth/Throat Given 1/21/23 0541)   droperidol (INAPSINE) injection 2.5 mg (2.5 mg Intravenous Given 1/21/23 0541)   fentaNYL citrate (PF) (SUBLIMAZE) injection 25 mcg (25 mcg Intravenous Given 1/21/23 0753)   hydrOXYzine (ATARAX) tablet 25 mg (25 mg Oral Given 1/21/23 0752)   pantoprazole (PROTONIX) injection 40 mg (40 mg Intravenous Given 1/21/23 0937)         MEDICAL DECISION MAKING, PROGRESS, and CONSULTS    All labs have been independently reviewed by me.  All radiology studies have been reviewed by me and the radiologist dictating the report.  All EKG's have been independently viewed and interpreted by me.      Discussion below represents my analysis of pertinent findings related to patient's condition, differential diagnosis, treatment plan and final disposition.      Differential diagnosis:    Pancreatitis, gastritis, peptic ulcer, bowel obstruction, volvulus, ileus, cholecystitis, cholangitis, hepatitis      Additional sources:    - Discussed/ obtained information from independent historians: Father    - External (non-ED) record review: Note from outside ED visit from yesterday evening          Orders placed during this visit:  Orders Placed This Encounter   Procedures   • COVID PRE-OP / PRE-PROCEDURE SCREENING ORDER (NO ISOLATION) - Swab, Nasopharynx   • COVID-19, FLU A/B, RSV PCR - Swab, Nasopharynx   • CT Abdomen Pelvis With Contrast   • US Gallbladder   • Comprehensive Metabolic Panel   • Lipase   • Urinalysis With Microscopic If Indicated (No Culture) - Urine, Clean  Catch   • Lactic Acid, Plasma   • Magnesium   • Phosphorus   • Urine Drug Screen - Urine, Clean Catch   • CBC Auto Differential   • CBC Auto Differential   • Comprehensive Metabolic Panel   • Magnesium   • Phosphorus   • Magnesium   • Magnesium   • Diet: Liquid Diets; Clear Liquid; Texture: Regular Texture (IDDSI 7); Fluid Consistency: Thin (IDDSI 0)   • NPO Diet NPO Type: Strict NPO   • Cardiac Monitoring   • Vital Signs   • Intake & Output   • Weigh Patient   • Oral Care   • Saline Lock & Maintain IV Access   • Place Sequential Compression Device   • Maintain Sequential Compression Device   • Activity - Ad Martha   • Notify Provider (With Default Parameters)   • Vital Signs   • Continuous Pulse Oximetry   • Obtain Baseline Clinical [place] Withdrawal Assessment - Ar (CIWA-Ar), Sedation Scale & Vital Signs   • Clinical [place] Withdrawal Assessment (CIWA-Ar)   • If CIWA-Ar Score Less Than 8 For 3 Consecutive Assessments, Monitor Every 4 Hours & Discontinue Assessment When CIWA-Ar Less Than 8 for 24 Hours   • Obtain Pre & Post Sedation Scores With Every Lorazepam Dose - Hold For POSS Greater Than 2 or RASS of -3 or Less   • Notify Provider - Withdrawal   • Notify Provider of Abnormal Lab Results   • Notify Provider - Vitals   • Code Status and Medical Interventions:   • Inpatient Gastroenterology Consult   • Patient Isolation Contact and Airborne   • Oxygen Therapy- Nasal Cannula; Titrate for SPO2: 90% - 95%   • ECG 12 Lead QT Measurement   • Insert Peripheral IV   • Inpatient Admission   • ED Bed Request   • Seizure Precautions   • Safety Precautions   • CBC & Differential         Additional orders considered but not ordered:      ED Course:    Consultants:      ED Course as of 01/21/23 2058   Sat Jan 21, 2023   [id number] This is a previously healthy 32-year-old male who presents because of severe epigastric and right upper quadrant pain with associated nausea and vomiting that is been present since around noon  yesterday.  He said he had not just eaten before the pain started.  He was at rest when he had severe sharp epigastric pain.  He has had nausea and yellow emesis.  The pain has both constant and colicky components to it he has a certain baseline level of pain no matter what but then it comes in severe waves.  He currently rates his pain as severe.  No appreciated fevers or systemic symptoms.  He has not had a bowel movement since the pain started.  He denies dysuria.  No other complaints at this time. [CC]      ED Course User Index  [CC] Hay Granados MD     He arrived in obvious pain, moaning, endorsing severe nausea and pain.  Treated with IV fluids, Zofran, Toradol, Dilaudid.  Initiating laboratory work-up and obtaining CT scan of the abdomen pelvis.  Nursing notified me that he is calling out for more pain medicine and is also rolling around on the floor of the room.  Given another dose of Dilaudid and GI cocktail.  Still calling out for more pain medicine then treated with droperidol and hydroxyzine as well as another dose of Dilaudid still reporting this is the worst pain of his life significantly worse than prior kidney stones.  He is upset because he believes that he is being treated like he is drug-seeking.  CT scan of the abdomen pelvis does not show any abnormalities.  CBC reassuring and nonactionable with no white count.  No elevation in lactate.  Very mild hypomagnesemia 1.5 does not explain his symptoms.  He is positive for COVID-19 he says he has been positive for a couple of weeks now does not think that is was causing his symptoms.  No abnormalities on CMP.  Renal function is good.  No elevation liver enzymes or alkaline phosphatase.  No elevation of lipase.  I have added on right upper quadrant ultrasound as this could be a gallstone that did not show up on the CT scan.  Ultrasound also does not show any abnormalities.  UDS is positive only for opiates which she has been given in the  hospital.  Continues to complain of pain was now treated with fentanyl.  Vitals remain normal but he continues to endorse severe pain at this point I have not diagnosed the cause of his pain think he would benefit from GI evaluation.  Medicine team consulted for admission.             AS OF 20:58 EST VITALS:    BP - 127/81  HR - 74  TEMP - 97.1 °F (36.2 °C) (Oral)  O2 SATS - 97%      MARS reviewed by Jenny Narayanan MD, Hay Granados MD           DIAGNOSIS  Final diagnoses:   Intractable abdominal pain   Nausea and vomiting, unspecified vomiting type   COVID-19         DISPOSITION  Admit      Please note that portions of this document were completed with voice recognition software.        Hay Granados MD  01/21/23 2761

## 2023-01-23 ENCOUNTER — READMISSION MANAGEMENT (OUTPATIENT)
Dept: CALL CENTER | Facility: HOSPITAL | Age: 33
End: 2023-01-23
Payer: COMMERCIAL

## 2023-01-23 ENCOUNTER — APPOINTMENT (OUTPATIENT)
Dept: NUCLEAR MEDICINE | Facility: HOSPITAL | Age: 33
End: 2023-01-23
Payer: COMMERCIAL

## 2023-01-23 VITALS
RESPIRATION RATE: 17 BRPM | OXYGEN SATURATION: 95 % | HEIGHT: 73 IN | TEMPERATURE: 98.4 F | DIASTOLIC BLOOD PRESSURE: 80 MMHG | SYSTOLIC BLOOD PRESSURE: 124 MMHG | HEART RATE: 50 BPM | WEIGHT: 185 LBS | BODY MASS INDEX: 24.52 KG/M2

## 2023-01-23 PROCEDURE — 78227 HEPATOBIL SYST IMAGE W/DRUG: CPT

## 2023-01-23 PROCEDURE — A9537 TC99M MEBROFENIN: HCPCS | Performed by: INTERNAL MEDICINE

## 2023-01-23 PROCEDURE — 0 TECHNETIUM TC 99M MEBROFENIN KIT: Performed by: INTERNAL MEDICINE

## 2023-01-23 PROCEDURE — 25010000002 SINCALIDE PER 5 MCG: Performed by: INTERNAL MEDICINE

## 2023-01-23 PROCEDURE — G0378 HOSPITAL OBSERVATION PER HR: HCPCS

## 2023-01-23 PROCEDURE — 99238 HOSP IP/OBS DSCHRG MGMT 30/<: CPT | Performed by: INTERNAL MEDICINE

## 2023-01-23 RX ORDER — PANTOPRAZOLE SODIUM 40 MG/1
40 TABLET, DELAYED RELEASE ORAL DAILY
Status: DISCONTINUED | OUTPATIENT
Start: 2023-01-23 | End: 2023-01-23 | Stop reason: HOSPADM

## 2023-01-23 RX ORDER — PANTOPRAZOLE SODIUM 40 MG/1
40 TABLET, DELAYED RELEASE ORAL DAILY
Qty: 30 TABLET | Refills: 0 | Status: SHIPPED | OUTPATIENT
Start: 2023-01-23

## 2023-01-23 RX ORDER — KIT FOR THE PREPARATION OF TECHNETIUM TC 99M MEBROFENIN 45 MG/10ML
1 INJECTION, POWDER, LYOPHILIZED, FOR SOLUTION INTRAVENOUS
Status: COMPLETED | OUTPATIENT
Start: 2023-01-23 | End: 2023-01-23

## 2023-01-23 RX ADMIN — PANTOPRAZOLE SODIUM 40 MG: 40 TABLET, DELAYED RELEASE ORAL at 14:14

## 2023-01-23 RX ADMIN — MULTIVITAMIN TABLET 1 TABLET: TABLET at 09:08

## 2023-01-23 RX ADMIN — FOLIC ACID 1 MG: 1 TABLET ORAL at 09:08

## 2023-01-23 RX ADMIN — SINCALIDE 1.7 MCG: 5 INJECTION, POWDER, LYOPHILIZED, FOR SOLUTION INTRAVENOUS at 12:24

## 2023-01-23 RX ADMIN — THIAMINE HCL TAB 100 MG 100 MG: 100 TAB at 09:08

## 2023-01-23 RX ADMIN — Medication 10 ML: at 09:09

## 2023-01-23 RX ADMIN — ACETAMINOPHEN 1000 MG: 500 TABLET, FILM COATED ORAL at 09:08

## 2023-01-23 RX ADMIN — MEBROFENIN 1 DOSE: 45 INJECTION, POWDER, LYOPHILIZED, FOR SOLUTION INTRAVENOUS at 11:13

## 2023-01-23 NOTE — PROGRESS NOTES
[facility] Medicine Services  PROGRESS NOTE    Patient Name: Tr Brito  : 1990  MRN: [id number]    Date of Admission: 2023  Primary Care Physician: Jorje Hurtado MD    Subjective   Subjective     CC:  F/u abdominal pain    HPI:  Patient resting in bed. Pain is improved, had a little bit last night, but overall better.    ROS:  Gen- No fevers, chills  CV- No chest pain, palpitations  Resp- No cough, dyspnea  GI- No N/V/D, +abd pain    Objective   Objective     Vital Signs:   Temp:  [97.2 °F (36.2 °C)-98.3 °F (36.8 °C)] 98.3 °F (36.8 °C)  Heart Rate:  [50-79] 50  Resp:  [15-20] 16  BP: (108-170)/() 119/87     Physical Exam:  Constitutional: No acute distress, awake, alert  HENT: NCAT, mucous membranes moist  Respiratory: Clear to auscultation bilaterally, respiratory effort normal   Cardiovascular: RRR, no murmurs, rubs, or gallops  Gastrointestinal: Positive bowel sounds, soft, nontender, nondistended  Musculoskeletal: No bilateral ankle edema  Psychiatric: Appropriate affect, cooperative  Neurologic: Oriented x 3, strength symmetric in all extremities, Cranial Nerves grossly intact to confrontation, speech clear  Skin: No rashes    Exam unchanged from     Results Reviewed:  LAB RESULTS:      Lab 2333 23  0409   WBC 5.53 8.32   HEMOGLOBIN 13.4 14.5   HEMATOCRIT 38.7 42.9   PLATELETS 234 284   NEUTROS ABS 2.49 7.10*   IMMATURE GRANS (ABS) 0.02 0.02   LYMPHS ABS 2.67 0.89   MONOS ABS 0.30 0.29   EOS ABS 0.02 0.00   MCV 92.8 93.9   LACTATE  --  0.9         Lab 23  0633 23  0409   SODIUM  --  141  --  138   POTASSIUM  --  4.7  --  4.1   CHLORIDE  --  106  --  101   CO2  --  27.0  --  23.0   ANION GAP  --  8.0  --  14.0   BUN  --  9  --  11   CREATININE  --  0.82  --  0.84   EGFR  --  121.2  --  120.3   GLUCOSE  --  86  --  101*   CALCIUM  --  8.3*  --  8.8   MAGNESIUM  --  2.2 1.7 1.5*   PHOSPHORUS 2.7  1.8*  --  2.6         Lab 01/22/23  0633 01/21/23  0409 01/20/23  1740   TOTAL PROTEIN 5.9* 7.2  --    ALBUMIN 3.7 4.7  --    GLOBULIN 2.2 2.5  --    ALT (SGPT) 31 38  --    AST (SGOT) 27 37  --    BILIRUBIN 0.6 0.5  --    ALK PHOS 56 73  --    LIPASE  --  26 97                     Brief Urine Lab Results  (Last result in the past 365 days)      Color   Clarity   Blood   Leuk Est   Nitrite   Protein   CREAT   Urine HCG        01/21/23 0520 Yellow   Clear   Negative   Negative   Negative   Negative                 Microbiology Results Abnormal     None          No radiology results from the last 24 hrs        I have reviewed the medications:  Scheduled Meds:acetaminophen, 1,000 mg, Oral, TID  thiamine, 100 mg, Oral, Daily   And  multivitamin, 1 tablet, Oral, Daily   And  folic acid, 1 mg, Oral, Daily  senna-docusate sodium, 2 tablet, Oral, BID  sodium chloride, 10 mL, Intravenous, Q12H      Continuous Infusions:   PRN Meds:.•  senna-docusate sodium **AND** polyethylene glycol **AND** bisacodyl **AND** bisacodyl  •  Calcium Replacement - Initiate Nurse / BPA Driven Protocol  •  Magnesium Standard Dose Replacement - Initiate Nurse / BPA Driven Protocol  •  Morphine **AND** naloxone  •  ondansetron  •  oxyCODONE-acetaminophen  •  Phosphorus Replacement - Initiate Nurse / BPA Driven Protocol  •  Potassium Replacement - Initiate Nurse / BPA Driven Protocol  •  sodium chloride  •  sodium chloride    Assessment & Plan   Assessment & Plan     Active Hospital Problems    Diagnosis  POA   • **Intractable abdominal pain [R10.9]  Yes      Resolved Hospital Problems   No resolved problems to display.        Brief Hospital Course to date:  Tr Brito is a 32 y.o. male with anxiety, mild depression, erectile dysfunction, alcoholism, prior possible alcohol withdrawal, HTN, who presented for evaluation of intractable abdominal pain     Intractable abdominal pain  -GI consulted, appreciate recs  -s/p EGD which showed  gastritis, on PPI  - currently undergoing HIDA w/CCK, pending results surgical consult  -PRN pain and nausea control      Anxiety  Depression  HTN  Alcohol use disorder - reportedly in remission  Erectile dysfunction  -Patient not currently taking any medications; monitor BP and start anti-hypertensives if persistently elevated.   -Denied any current EtOH withdrawal, denies history of EtOH withdrawal (despite outpatient PCP notes); will place on CIWA with vitamins without ativan (nursing to call if scoring on CIWA)    Expected Discharge Location and Transportation: Home  Expected Discharge   Expected Discharge Date and Time     Expected Discharge Date Expected Discharge Time    Jan 23, 2023            DVT prophylaxis:  Mechanical DVT prophylaxis orders are present.          CODE STATUS:   Code Status and Medical Interventions:   Ordered at: 01/21/23 1324     Level Of Support Discussed With:    Patient     Code Status (Patient has no pulse and is not breathing):    CPR (Attempt to Resuscitate)     Medical Interventions (Patient has pulse or is breathing):    Full Support     Release to patient:    Routine Release       Jacque Tomas, DO  01/23/23

## 2023-01-23 NOTE — PLAN OF CARE
Goal Outcome Evaluation:         Patient was up most of the night.  He had anxiety.  BP did shoot up and hydralazine given. SB on monitor.  Anticipating HIDA scan today. On room air.

## 2023-01-23 NOTE — PLAN OF CARE
Goal Outcome Evaluation:           Progress: improving  Outcome Evaluation: VSS, on RA.  pt got scheduled tylenol but hasn't requested any other pain medication.  Pt went for HIDA scan.  No other complaints this shift.  Will continue to monitor patient and continue with GOC

## 2023-01-23 NOTE — CASE MANAGEMENT/SOCIAL WORK
Discharge Planning Assessment  [facility]     Patient Name: Tr Brito  MRN: [id number]  Today's Date: 1/23/2023    Admit Date: 1/21/2023    Plan: home   Discharge Needs Assessment     Row Name 01/23/23 0932       Living Environment    People in Home alone    Current Living Arrangements home    Primary Care Provided by self    Provides Primary Care For no one    Family Caregiver if Needed parent(s)    Quality of Family Relationships involved;helpful       Transition Planning    Patient/Family Anticipates Transition to home    Patient/Family Anticipated Services at Transition none    Transportation Anticipated family or friend will provide       Discharge Needs Assessment    Readmission Within the Last 30 Days no previous admission in last 30 days    Equipment Currently Used at Home none    Concerns to be Addressed discharge planning    Anticipated Changes Related to Illness none    Equipment Needed After Discharge none               Discharge Plan     Row Name 01/23/23 0933       Plan    Plan home    Patient/Family in Agreement with Plan yes    Plan Comments Pt reports he lives alone in [facility]. He is independent with ADLs and denies use of any DME. He is followed by his PCP and has drug coverage. At this time his plan for discharge is to return home. Cm to follow for any discharge needs.    Final Discharge Disposition Code 01 - home or self-care              Continued Care and Services - Admitted Since 1/21/2023    Coordination has not been started for this encounter.       Expected Discharge Date and Time     Expected Discharge Date Expected Discharge Time    Jan 23, 2023          Demographic Summary     Row Name 01/23/23 0932       General Information    Admission Type inpatient    Referral Source physician    Reason for Consult discharge planning    General Information Comments PCP Jorje Hurtado       Contact Information    Permission Granted to Share Info With family/designee    Contact  Information Comments Heber Brito [phone]               Functional Status     Row Name 01/23/23 2125       Functional Status, IADL    Medications independent    Meal Preparation independent    Housekeeping independent    Laundry independent    Shopping independent       Mental Status    General Appearance WDL WDL               Psychosocial    No documentation.                Abuse/Neglect    No documentation.                Legal    No documentation.                Substance Abuse    No documentation.                Patient Forms    No documentation.                   Poppy Barry RN

## 2023-01-23 NOTE — DISCHARGE SUMMARY
[facility] Medicine Services  DISCHARGE SUMMARY    Patient Name: Tr Brito  : 1990  MRN: [id number]    Date of Admission: 2023  2:21 AM  Date of Discharge:  2023  Primary Care Physician: Jorje Hurtado MD    Consults     Date and Time Order Name Status Description    2023  8:47 AM Inpatient Gastroenterology Consult Completed           Hospital Course     Presenting Problem:   Intractable abdominal pain [R10.9]    Active Hospital Problems    Diagnosis  POA   • **Intractable abdominal pain [R10.9]  Yes      Resolved Hospital Problems   No resolved problems to display.          Hospital Course:  rT Brito is a 32 y.o. male with anxiety, mild depression, erectile dysfunction, alcoholism, prior possible alcohol withdrawal, HTN, who presented for evaluation of intractable abdominal pain     Intractable abdominal pain  - GI consulted, appreciate recs, s/p EGD which showed gastritis, on PPI, continue at discharge. Biopsies taken, f/u on path oupatient  - HIDA w/CCK completed and is normal, RUQ ultrasound unremarkable, CT abd/pelvis negative  - pain and nausea much improved at discharge     Anxiety  Depression  Hx of HTN  Alcohol use disorder - reportedly in remission  Erectile dysfunction  -Patient not currently taking any medications  -Denied any EtOH withdrawal, denies history of EtOH withdrawal (despite outpatient PCP notes)    Discharge Follow Up Recommendations for outpatient labs/diagnostics:  - f/u with PCP in 1-2 weeks  - GI to f/u pathology outpatient    Day of Discharge     HPI:   Patient resting in bed. Pain is improved, had a little bit last night, but overall significantly improved.    Review of Systems  Gen- No fevers, chills  CV- No chest pain, palpitations  Resp- No cough, dyspnea  GI- No N/V/D, abd pain    Vital Signs:   Temp:  [97.2 °F (36.2 °C)-98.3 °F (36.8 °C)] 98.3 °F (36.8 °C)  Heart Rate:  [50-79] 50  Resp:  [15-17] 16  BP:  (108-170)/() 119/87      Physical Exam:  Constitutional: No acute distress, awake, alert  HENT: NCAT, mucous membranes moist  Respiratory: Clear to auscultation bilaterally, respiratory effort normal   Cardiovascular: RRR, no murmurs, rubs, or gallops  Gastrointestinal: Positive bowel sounds, soft, nontender, nondistended  Musculoskeletal: No bilateral ankle edema  Psychiatric: Appropriate affect, cooperative  Neurologic: Oriented x 3, strength symmetric in all extremities, Cranial Nerves grossly intact to confrontation, speech clear  Skin: No rashes      Pertinent  and/or Most Recent Results     LAB RESULTS:      Lab 01/22/23  0633 01/21/23  0409   WBC 5.53 8.32   HEMOGLOBIN 13.4 14.5   HEMATOCRIT 38.7 42.9   PLATELETS 234 284   NEUTROS ABS 2.49 7.10*   IMMATURE GRANS (ABS) 0.02 0.02   LYMPHS ABS 2.67 0.89   MONOS ABS 0.30 0.29   EOS ABS 0.02 0.00   MCV 92.8 93.9   LACTATE  --  0.9         Lab 01/22/23  1735 01/22/23  0633 01/21/23 2008 01/21/23  0409   SODIUM  --  141  --  138   POTASSIUM  --  4.7  --  4.1   CHLORIDE  --  106  --  101   CO2  --  27.0  --  23.0   ANION GAP  --  8.0  --  14.0   BUN  --  9  --  11   CREATININE  --  0.82  --  0.84   EGFR  --  121.2  --  120.3   GLUCOSE  --  86  --  101*   CALCIUM  --  8.3*  --  8.8   MAGNESIUM  --  2.2 1.7 1.5*   PHOSPHORUS 2.7 1.8*  --  2.6         Lab 01/22/23  0633 01/21/23 0409 01/20/23  1740   TOTAL PROTEIN 5.9* 7.2  --    ALBUMIN 3.7 4.7  --    GLOBULIN 2.2 2.5  --    ALT (SGPT) 31 38  --    AST (SGOT) 27 37  --    BILIRUBIN 0.6 0.5  --    ALK PHOS 56 73  --    LIPASE  --  26 97                     Brief Urine Lab Results  (Last result in the past 365 days)      Color   Clarity   Blood   Leuk Est   Nitrite   Protein   CREAT   Urine HCG        01/21/23 0520 Yellow   Clear   Negative   Negative   Negative   Negative               Microbiology Results (last 10 days)     Procedure Component Value - Date/Time    COVID PRE-OP / PRE-PROCEDURE SCREENING ORDER  (NO ISOLATION) - Swab, Nasopharynx [[id number]]  (Abnormal) Collected: 01/21/23 0426    Lab Status: Final result Specimen: Swab from Nasopharynx Updated: 01/21/23 0552    Narrative:      The following orders were created for panel order COVID PRE-OP / PRE-PROCEDURE SCREENING ORDER (NO ISOLATION) - Swab, Nasopharynx.  Procedure                               Abnormality         Status                     ---------                               -----------         ------                     COVID-19, FLU A/B, RSV P...[[id number]]  Abnormal            Final result                 Please view results for these tests on the individual orders.    COVID-19, FLU A/B, RSV PCR - Swab, Nasopharynx [[id number]]  (Abnormal) Collected: 01/21/23 0426    Lab Status: Final result Specimen: Swab from Nasopharynx Updated: 01/21/23 0552     COVID19 Detected     Influenza A PCR Not Detected     Influenza B PCR Not Detected     RSV, PCR Not Detected    Narrative:      Fact sheet for providers: https://www.fda.gov/media/086850/download    Fact sheet for patients: https://www.fda.gov/media/703561/download    Test performed by PCR.          CT Abdomen Pelvis Without Contrast    Result Date: 1/20/2023  CT OF THE ABDOMEN AND PELVIS WITHOUT CONTRAST HISTORY: Right flank pain PROCEDURE:  Routine axial images were obtained from the lung bases to the pubic symphysis.  No contrast was given. This study was performed with techniques to keep radiation doses as low as reasonably achievable, (ALARA). Individualized dose reduction techniques using automated exposure control or adjustment of mA and/or kV according to the patient size were employed. COMPARISON: None. FINDINGS: Abdomen: The gallbladder, solid abdominal organs and ureters are normal. The GI tract is unremarkable, including the appendix. Pelvis: The urinary bladder is normal. There is no pelvic or abdominal ascites, adenopathy, or acute osseous abnormality.    Unremarkable. Images reviewed,  interpreted and dictated by Dr. Andrew Glez MD    CT Abdomen Pelvis With Contrast    Result Date: 1/21/2023  EXAMINATION:  CT ABDOMEN PELVIS W CONTRAST DATE: 1/21/2023 4:34 AM INDICATION: Severe epigastric and right upper quadrant pain, intractable nausea and vomiting ; COMPARISON: None. PROCEDURE:  CT of the abdomen and pelvis was performed following the intravenous administration of iodinated contrast material.  CT dose lowering techniques were used, to include: automated exposure control, adjustment for patient size, and or use of iterative reconstruction. FINDINGS: Visualized lower chest: Unremarkable.  ABDOMEN: Liver and Biliary system:  No suspicious liver lesion. No biliary ductal dilation. Gallbladder:  Vicarious excretion of contrast versus sludge in the gallbladder. No gallbladder luminal distention or other CT findings of gallbladder inflammation. Spleen:  Normal. Pancreas:  Normal. Adrenal glands:  Normal. Kidneys and ureters:  Kidneys enhance symmetrically. No suspicious mass. Contrast excretion into the renal collecting systems and ureters limits evaluation for nonobstructing calculus.  No hydronephrosis. Aorta/IVC: No aortic aneurysm or dissection.  IVC normal. Lymph nodes:  No lymphadenopathy. Gastrointestinal tract:  Stomach normal.  Small bowel nondilated.  Appendix normal. No colonic wall thickening or dilation. There is some enteric contrast in the proximal small bowel. Peritoneum/Mesentery: No pneumoperitoneum.  No ascites. Abdominal wall: Unremarkable. PELVIS: Urinary bladder: Contains excreted contrast. Reproductive organs: No acute abnormality by CT. Lymph Nodes: No pelvic lymphadenopathy. BONES:  Unremarkable.      1.  No acute abnormality. Electronically signed by:  Shoshana Thomas M.D.  1/21/2023 3:08 AM Mountain Time    CT Abdomen Pelvis With Contrast    Result Date: 1/20/2023  CT SCAN OF THE ABDOMEN AND PELVIS WITH CONTRAST    1/20/2023 9:14 PM HISTORY: Epigastric pain. COMPARISON: Same  day. PROCEDURE: The patient was injected with IV contrast. Oral contrast was administered. Axial images were obtained from the lung bases to the pubic symphysis by computed tomography. This study was performed with techniques to keep radiation doses as low as reasonably achievable, (ALARA). Individualized dose reduction techniques using automated exposure control or adjustment of mA and/or kV according to the patient size were employed. FINDINGS: ABDOMEN: The lung bases are clear. The gallbladder is unremarkable. The solid organs are otherwise unremarkable. There is no free fluid or adenopathy. PELVIS: The appendix is normal. The urinary bladder is mildly distended. There is no free fluid or adenopathy. There is subtle edema in the mesentery.    Interval development of mesenteric edema in the pelvis. This is of uncertain etiology. Images reviewed, interpreted, and dictated by LANIE Muñoz MD    US Gallbladder    Result Date: 1/21/2023  US GALLBLADDER Date of Exam: 1/21/2023 7:16 AM EST Indication: severe RUQ pain,. Comparison: No comparisons available. Technique: Grayscale and color Doppler ultrasound evaluation of the right upper quadrant was performed. FINDINGS: The gallbladder demonstrates no evidence for gallstones, gallbladder wall thickening, or pericholecystic edema. There is no intrahepatic or extrahepatic biliary dilatation. The common bile duct measures approximately 5mm. No focal hepatic abnormalities are seen. The visualized portions of the pancreatic head and proximal body are unremarkable. Screening evaluation of the right kidney demonstrates no evidence for hydronephrosis. Flow is demonstrated within the portal and hepatic veins on Doppler evaluation.     1.Unremarkable right upper quadrant ultrasound. Electronically Signed: Yung Eddy  1/21/2023 8:14 AM EST  Workstation ID: [id number]    NM HIDA SCAN WITH PHARMACOLOGICAL INTERVENTION    Result Date: 1/23/2023  DATE OF EXAM: 1/23/2023 10:59  AM EST PROCEDURE: NM HIDA SCAN WITH PHARMACOLOGICAL INTERVENTION INDICATIONS: Abdominal pain COMPARISON: No comparisons available. TECHNIQUE: The patient received 7.7  mCi of mebrofenin intravenously and images were obtained of the abdomen in the anterior projection through 60 minutes. Patient was administered 1.7mcg of Kinevac to assess gallbladder emptying. Counts were obtained over the gallbladder to calculate the ejection fraction. FINDINGS: Normal radiopharmaceutical uptake is demonstrated within the liver. Normal radiopharmaceutical uptake within the gallbladder within the first 5 minutes of imaging. The findings indicate patency of the cystic duct. There is no scintigraphic evidence of acute or chronic cholecystitis. Progression of radiopharmaceutical into the small bowel indicates patency of the common bile duct. The calculated gallbladder ejection fraction is 93%.     Normal HIDA scan. The gallbladder ejection fraction is 93%. Electronically Signed: Gabriela Malcolm  1/23/2023 2:01 PM EST  Workstation ID: [id number]                  Plan for Follow-up of Pending Labs/Results:   Pending Labs     Order Current Status    Tissue Pathology Exam In process        Discharge Details        Discharge Medications      New Medications      Instructions Start Date   pantoprazole 40 MG EC tablet  Commonly known as: PROTONIX   40 mg, Oral, Daily         Continue These Medications      Instructions Start Date   cetirizine 10 MG tablet  Commonly known as: zyrTEC   10 mg, Daily      ondansetron 8 MG tablet  Commonly known as: Zofran   8 mg, Oral, Every 8 Hours PRN      sildenafil 100 MG tablet  Commonly known as: Viagra   100 mg, Oral, Daily PRN         Stop These Medications    amoxicillin-clavulanate 875-125 MG per tablet  Commonly known as: Augmentin            No Known Allergies      Discharge Disposition:  Home or Self Care    Diet:  Hospital:  Diet Order   Procedures   • Diet: Regular/House Diet; Texture: Regular Texture  (IDDSI 7); Fluid Consistency: Thin (IDDSI 0)       Activity:   - as tolerated    Restrictions or Other Recommendations:  - none       CODE STATUS:    Code Status and Medical Interventions:   Ordered at: 01/21/23 1324     Level Of Support Discussed With:    Patient     Code Status (Patient has no pulse and is not breathing):    CPR (Attempt to Resuscitate)     Medical Interventions (Patient has pulse or is breathing):    Full Support     Release to patient:    Routine Release       No future appointments.              Jacque Tomas DO  01/23/23      Time Spent on Discharge:  I spent  25  minutes on this discharge activity which included: face-to-face encounter with the patient, reviewing the data in the system, coordination of the care with the nursing staff as well as consultants, documentation, and entering orders.

## 2023-01-24 ENCOUNTER — TRANSITIONAL CARE MANAGEMENT TELEPHONE ENCOUNTER (OUTPATIENT)
Dept: CALL CENTER | Facility: HOSPITAL | Age: 33
End: 2023-01-24
Payer: COMMERCIAL

## 2023-01-24 LAB
CYTO UR: NORMAL
LAB AP CASE REPORT: NORMAL
LAB AP CLINICAL INFORMATION: NORMAL
PATH REPORT.FINAL DX SPEC: NORMAL
PATH REPORT.GROSS SPEC: NORMAL

## 2023-01-24 NOTE — OUTREACH NOTE
Call Center TCM Note    Flowsheet Row Responses   [facility] patient discharged from? [facility]   Does the patient have one of the following disease processes/diagnoses(primary or secondary)? Other   TCM attempt successful? No  [ VR]   Unsuccessful attempts Attempt 1          Daija Fonseca RN    2023, 09:37 EST

## 2023-01-24 NOTE — OUTREACH NOTE
Prep Survey    Flowsheet Row Responses   [facility] patient discharged from? [facility]   Is LACE score < 7 ? Yes   Eligibility [facility]   Date of Admission 01/21/23   Date of Discharge 01/23/23   Discharge Disposition Home or Self Care   Discharge diagnosis intractable abdominal pain   Does the patient have one of the following disease processes/diagnoses(primary or secondary)? Other   Does the patient have Home health ordered? No   Is there a DME ordered? No   Prep survey completed? Yes          [place] - Registered Nurse

## 2023-01-24 NOTE — OUTREACH NOTE
Call Center TCM Note    Flowsheet Row Responses   [facility] patient discharged from? [facility]   Does the patient have one of the following disease processes/diagnoses(primary or secondary)? Other   TCM attempt successful? No   Unsuccessful attempts Attempt 2   Call Status Left message          Daija Fonseca RN    1/24/2023, 14:54 EST

## 2023-01-25 ENCOUNTER — TRANSITIONAL CARE MANAGEMENT TELEPHONE ENCOUNTER (OUTPATIENT)
Dept: CALL CENTER | Facility: HOSPITAL | Age: 33
End: 2023-01-25
Payer: COMMERCIAL

## 2023-01-25 NOTE — OUTREACH NOTE
Call Center TCM Note    Flowsheet Row Responses   [facility] [place] patient discharged from? [facility]   Does the patient have one of the following disease processes/diagnoses(primary or secondary)? Other   TCM attempt successful? No   Unsuccessful attempts Attempt 3          Taylor Cespedes RN    1/25/2023, 09:08 EST

## 2023-02-02 ENCOUNTER — OFFICE VISIT (OUTPATIENT)
Dept: INTERNAL MEDICINE | Facility: CLINIC | Age: 33
End: 2023-02-02
Payer: COMMERCIAL

## 2023-02-02 VITALS
HEART RATE: 75 BPM | HEIGHT: 73 IN | SYSTOLIC BLOOD PRESSURE: 142 MMHG | WEIGHT: 185 LBS | OXYGEN SATURATION: 99 % | BODY MASS INDEX: 24.52 KG/M2 | DIASTOLIC BLOOD PRESSURE: 88 MMHG | TEMPERATURE: 98 F

## 2023-02-02 DIAGNOSIS — R10.11 RUQ PAIN: Primary | ICD-10-CM

## 2023-02-02 PROCEDURE — 99213 OFFICE O/P EST LOW 20 MIN: CPT | Performed by: INTERNAL MEDICINE

## 2023-02-02 RX ORDER — PROMETHAZINE HYDROCHLORIDE 25 MG/1
TABLET ORAL
COMMUNITY
Start: 2023-01-21

## 2023-02-02 RX ORDER — OMEPRAZOLE 40 MG/1
40 CAPSULE, DELAYED RELEASE ORAL DAILY
COMMUNITY
Start: 2023-01-19

## 2023-02-02 NOTE — PROGRESS NOTES
"Chief Complaint  Hospital Follow Up Visit (Went to ER, ) and Abdominal Pain (Severe pain, has gotten better)    Subjective    Tr Brito is a 32 y.o. male.     Tr Brito presents to [facility] for       History of Present Illness    1. Right upper quadrant pain - The woke up on 01/27/2023 and experienced pain under his ribs. Approximately 30 minutes later, he decided to go to the emergency room. He has had kidney stones in the past, and his pain worsened. He confirms that all of his CT scans and HIDA scans were performed, and were normal. He was in the \"worst pain ever been in his life\" and nothing could treat his pain. He never got comfortable until whatever was going on subsided, which was approximately 24 hours. He was admitted to the hospital and they did every test he can think of. He had COVID-19 approximately 1.5 weeks prior to that. He had his gallbladder and liver was checked, and everything was normal. He was vomiting every 20 minutes for approximately 24 hours. He denies diarrhea. He feels fine now.      The following portions of the patient's history were reviewed and updated as appropriate: allergies, current medications, past family history, past medical history, past social history, past surgical history and problem list.    Review of Systems    Objective   Vital Signs:   /88   Pulse 75   Temp 98 °F (36.7 °C)   Ht 185.4 cm (73\")   Wt 83.9 kg (185 lb)   SpO2 99%   BMI 24.41 kg/m²     Body mass index is 24.41 kg/m².    Physical Exam  Constitutional:       General: He is not in acute distress.  HENT:      Head: Normocephalic and atraumatic.   Eyes:      Extraocular Movements: Extraocular movements intact.      Pupils: Pupils are equal, round, and reactive to light.   Abdominal:      General: Bowel sounds are normal.      Palpations: There is no mass.      Tenderness: There is no abdominal tenderness. There is no rebound. "      Comments: No peritoneal signs.   Neurological:      Mental Status: He is alert and oriented to person, place, and time.               Assessment and Plan  Diagnoses and all orders for this visit:    1. Right upper quadrant pain.  - Patient is doing fine here today after review of history and physical, and obviously after review of his hospitalization per report today, I do not find any etiology on exam here that could be causing his right upper quadrant pain, although with the characteristic nature of his pain in regards to presentation, hospital course work-up, and obviously description today, may be suggestive of potential hepatobiliary issues, but nothing has been found here on exam or per lab or per other work-up. There is a strong family history of gallstones and so there is a possibility that he did have a bile gallstone and passed it, which would produce similar pain here. The patient is doing well overall.     2. Anticipatory guidance and nutrition.  - I did recommend that he may want to consider a type of gallbladder prevention diet, which means avoiding spicy food, avoiding fatty foods, high caloric foods, and see advancing diet as tolerated with further observation for any concerns of possible future exacerbations. Otherwise, I do not see anything issues here today and so he will follow up back in clinic with me as needed.          Follow Up   Return in about 4 months (around 6/2/2023).  Patient was given instructions and counseling regarding his condition or for health maintenance advice. Please see specific information pulled into the AVS if appropriate.      Transcribed from ambient dictation for Jorje Hurtado MD by Mckenzie Raymond.  02/02/23   18:01 EST    Patient or patient representative verbalized consent to the visit recording.  I have personally performed the services described in this document as transcribed by the above individual, and it is both accurate and complete.

## 2023-02-03 ENCOUNTER — TELEPHONE (OUTPATIENT)
Dept: GASTROENTEROLOGY | Facility: CLINIC | Age: 33
End: 2023-02-03
Payer: COMMERCIAL

## 2023-02-03 NOTE — TELEPHONE ENCOUNTER
I tried to call Mr Brito to give biopsy results. No answer; left voice message. I will mail result letter.

## 2023-02-16 ENCOUNTER — PATIENT MESSAGE (OUTPATIENT)
Dept: INTERNAL MEDICINE | Facility: CLINIC | Age: 33
End: 2023-02-16
Payer: COMMERCIAL

## 2023-02-16 DIAGNOSIS — R20.0 NUMBNESS OF TONGUE: Primary | ICD-10-CM

## 2023-02-20 NOTE — TELEPHONE ENCOUNTER
From: Tr Brito  To: Jorje Hurtado  Sent: 2/16/2023 1:16 PM EST  Subject: Issues    I have noticed for sometime now, my tongue sometimes feels “heavy” when I talk. It’s been that way on and off for a long time, but it has now become daily, with very brief, if any relief. When I talk a lot it feels as if my tongue is tired. I have terrified myself and convinced myself that I have ALS or MS. I have spent the past week and a half with anxiety and panic attacks unlike anything I’ve ever had. I have lost 7 pounds in about 10 days because I am so anxious I can’t eat. I need help here, because I am worried there is a real possibility that something serious is wrong. I wonder if I need to see neurologist? I am very scared. My anxiety around this is effecting my life, not only at work but my personal life. Please advise. Thank you.     Tr Brito

## 2023-03-06 ENCOUNTER — OFFICE VISIT (OUTPATIENT)
Dept: INTERNAL MEDICINE | Facility: CLINIC | Age: 33
End: 2023-03-06
Payer: COMMERCIAL

## 2023-03-06 VITALS
OXYGEN SATURATION: 98 % | DIASTOLIC BLOOD PRESSURE: 90 MMHG | WEIGHT: 182 LBS | SYSTOLIC BLOOD PRESSURE: 138 MMHG | TEMPERATURE: 98.2 F | RESPIRATION RATE: 20 BRPM | BODY MASS INDEX: 24.01 KG/M2 | HEART RATE: 90 BPM

## 2023-03-06 DIAGNOSIS — R20.0 NUMBNESS OF TONGUE: Primary | ICD-10-CM

## 2023-03-06 DIAGNOSIS — G47.33 OSA (OBSTRUCTIVE SLEEP APNEA): ICD-10-CM

## 2023-03-06 DIAGNOSIS — F41.9 ANXIETY: ICD-10-CM

## 2023-03-06 PROCEDURE — 99214 OFFICE O/P EST MOD 30 MIN: CPT | Performed by: INTERNAL MEDICINE

## 2023-03-06 RX ORDER — DESVENLAFAXINE SUCCINATE 50 MG/1
50 TABLET, EXTENDED RELEASE ORAL DAILY
Qty: 30 TABLET | Refills: 2 | Status: SHIPPED | OUTPATIENT
Start: 2023-03-06 | End: 2023-03-31

## 2023-03-06 RX ORDER — CLONAZEPAM 0.5 MG/1
TABLET ORAL
Qty: 45 TABLET | Refills: 0 | Status: SHIPPED | OUTPATIENT
Start: 2023-03-06

## 2023-03-06 NOTE — PROGRESS NOTES
"Chief Complaint  No chief complaint on file.    Subjective    Tr Brito is a 32 y.o. male.     Tr Brito presents to [facility] for       History of Present Illness    1. Tongue numbness. - The patient's tongue feels heavy when he talks, and adds that it is not faint and he can feel it. He was sleeping with his girlfriend for 2 nights over the course of 3 weeks, and while he was sleeping, he grabbed her shorts and was trying to pull them down. He does not remember what happened. He does not feel coordinated with his left arm when he writes, and it also gets tired easily.  He \"jams\" down on his wrist, and it feels like his wrist is not right when he sleeps. He is left-handed. He denies any visual problems, difficulty talking, or slurring of speech. He has days that are better than others. He feels that yesterday is the first decent day he has had in weeks. He has bad anxiety all the time. He knows something does not feel right, and would like to go to neurology.    2. Sleep apnea. - The patient thinks he has sleep apnea. He wakes himself up all the time. His girlfriend notices it too. He does not snore. His mother had sleep apnea. He has never been more anxious than now, and has panic attacks, and he can not sleep. He is really scared.    The following portions of the patient's history were reviewed and updated as appropriate: allergies, current medications, past family history, past medical history, past social history, past surgical history and problem list.    Review of Systems    Objective   Vital Signs:   /90 (BP Location: Right arm, Patient Position: Sitting, Cuff Size: Adult)   Pulse 90   Temp 98.2 °F (36.8 °C) (Temporal)   Resp 20   Wt 82.6 kg (182 lb)   SpO2 98%   BMI 24.01 kg/m²     Body mass index is 24.01 kg/m².    Physical Exam  Constitutional:       General: He is not in acute distress.  HENT:      Mouth/Throat:      " Mouth: Mucous membranes are moist.      Pharynx: Oropharynx is clear.      Comments: No signs of tongue fasciculations.  Eyes:      Extraocular Movements: Extraocular movements intact.      Pupils: Pupils are equal, round, and reactive to light.   Cardiovascular:      Rate and Rhythm: Normal rate and regular rhythm.      Pulses: Normal pulses.      Heart sounds: Normal heart sounds, S1 normal and S2 normal. No murmur heard.    No friction rub. No gallop.   Pulmonary:      Effort: Pulmonary effort is normal.      Breath sounds: Normal breath sounds. No wheezing or rhonchi.   Musculoskeletal:      Comments: Plus 5 out of 5 both upper and lower proximal distributions   Neurological:      Mental Status: He is alert and oriented to person, place, and time.      Cranial Nerves: Cranial nerves 2-12 are intact.      Deep Tendon Reflexes: Reflexes are normal and symmetric.               Assessment and Plan  Diagnoses and all orders for this visit:    Spent approximately 35 minutes face-to-face with patient    1. Numbness of the tongue, nonspecific sensations of the tongue.  - Not clear what etiology causes these sensations. No focal findings on today's exam or per history. There can be some suggestion maybe this might be related to some anxiety, but because of the persistent of the symptoms and affecting patient's overall quality of life here in regards to worsening anxiety and his engagement in day to day activity, I am going to get him referred to neurology, Dr. Andrew Palafox, is the physician in his appointment. His appointment there is 05/30/2024, but obviously patient says that he does not feel that he can wait that long given the gravity of his symptoms and most likely worsening his anxiety, so we will make an attempt to try to get him in sooner with another provider if at all possible.    2. Anxiety.  - Again, this is also some urgency with him, so we will go ahead and get him arranged with neurology for further  evaluation and recommendations.    Diagnoses and all orders for this visit:    1. Numbness of tongue (Primary)-no focal findings on physical exam or history, patient will be referred to neurology    2. Anxiety  -     desvenlafaxine (PRISTIQ) 50 MG 24 hr tablet; Take 1 tablet by mouth Daily.  Dispense: 30 tablet; Refill: 2  -     clonazePAM (KlonoPIN) 0.5 MG tablet; Take one tablet po bid  Dispense: 45 tablet; Refill: 0    Side effects and precautions of the new medication(s) have been discussed with patient  I have answered patients questions thoroughly to their understanding.  If patient should experience any side effects from the medication, a follow up appointment should be made.      3. MEME (obstructive sleep apnea)  -     Ambulatory Referral to Sleep Medicine          Follow Up   No follow-ups on file.  Patient was given instructions and counseling regarding his condition or for health maintenance advice. Please see specific information pulled into the AVS if appropriate.      start on pristiqu  Start on klonopin 0.5 bid     Transcribed from ambient dictation for Jorje Hurtado MD by Mckenzie Raymond.  03/06/23   14:07 EST    Patient or patient representative verbalized consent to the visit recording.  I have personally performed the services described in this document as transcribed by the above individual, and it is both accurate and complete.

## 2023-03-07 ENCOUNTER — OFFICE VISIT (OUTPATIENT)
Dept: NEUROLOGY | Facility: CLINIC | Age: 33
End: 2023-03-07
Payer: COMMERCIAL

## 2023-03-07 ENCOUNTER — LAB (OUTPATIENT)
Dept: LAB | Facility: HOSPITAL | Age: 33
End: 2023-03-07
Payer: COMMERCIAL

## 2023-03-07 VITALS
BODY MASS INDEX: 24.12 KG/M2 | SYSTOLIC BLOOD PRESSURE: 120 MMHG | OXYGEN SATURATION: 98 % | WEIGHT: 182 LBS | HEART RATE: 75 BPM | DIASTOLIC BLOOD PRESSURE: 70 MMHG | HEIGHT: 73 IN

## 2023-03-07 DIAGNOSIS — R20.0 FACIAL NUMBNESS: ICD-10-CM

## 2023-03-07 DIAGNOSIS — G47.33 OSA (OBSTRUCTIVE SLEEP APNEA): ICD-10-CM

## 2023-03-07 DIAGNOSIS — R20.0 FACIAL NUMBNESS: Primary | ICD-10-CM

## 2023-03-07 LAB
AMMONIA BLD-SCNC: 21 UMOL/L (ref 16–60)
BASOPHILS # BLD AUTO: 0.03 10*3/MM3 (ref 0–0.2)
BASOPHILS NFR BLD AUTO: 0.8 % (ref 0–1.5)
DEPRECATED RDW RBC AUTO: 42.4 FL (ref 37–54)
EOSINOPHIL # BLD AUTO: 0.12 10*3/MM3 (ref 0–0.4)
EOSINOPHIL NFR BLD AUTO: 3 % (ref 0.3–6.2)
ERYTHROCYTE [DISTWIDTH] IN BLOOD BY AUTOMATED COUNT: 12.4 % (ref 12.3–15.4)
ERYTHROCYTE [SEDIMENTATION RATE] IN BLOOD: 9 MM/HR (ref 0–15)
HCT VFR BLD AUTO: 45.9 % (ref 37.5–51)
HGB BLD-MCNC: 15.7 G/DL (ref 13–17.7)
IMM GRANULOCYTES # BLD AUTO: 0.01 10*3/MM3 (ref 0–0.05)
IMM GRANULOCYTES NFR BLD AUTO: 0.3 % (ref 0–0.5)
LYMPHOCYTES # BLD AUTO: 1.5 10*3/MM3 (ref 0.7–3.1)
LYMPHOCYTES NFR BLD AUTO: 37.6 % (ref 19.6–45.3)
MCH RBC QN AUTO: 31.9 PG (ref 26.6–33)
MCHC RBC AUTO-ENTMCNC: 34.2 G/DL (ref 31.5–35.7)
MCV RBC AUTO: 93.3 FL (ref 79–97)
MONOCYTES # BLD AUTO: 0.36 10*3/MM3 (ref 0.1–0.9)
MONOCYTES NFR BLD AUTO: 9 % (ref 5–12)
NEUTROPHILS NFR BLD AUTO: 1.97 10*3/MM3 (ref 1.7–7)
NEUTROPHILS NFR BLD AUTO: 49.3 % (ref 42.7–76)
NRBC BLD AUTO-RTO: 0 /100 WBC (ref 0–0.2)
PLATELET # BLD AUTO: 282 10*3/MM3 (ref 140–450)
PMV BLD AUTO: 9.9 FL (ref 6–12)
RBC # BLD AUTO: 4.92 10*6/MM3 (ref 4.14–5.8)
WBC NRBC COR # BLD: 3.99 10*3/MM3 (ref 3.4–10.8)

## 2023-03-07 PROCEDURE — 82607 VITAMIN B-12: CPT

## 2023-03-07 PROCEDURE — 86592 SYPHILIS TEST NON-TREP QUAL: CPT

## 2023-03-07 PROCEDURE — 83519 RIA NONANTIBODY: CPT

## 2023-03-07 PROCEDURE — 36415 COLL VENOUS BLD VENIPUNCTURE: CPT

## 2023-03-07 PROCEDURE — 82746 ASSAY OF FOLIC ACID SERUM: CPT

## 2023-03-07 PROCEDURE — 85652 RBC SED RATE AUTOMATED: CPT

## 2023-03-07 PROCEDURE — 80050 GENERAL HEALTH PANEL: CPT

## 2023-03-07 PROCEDURE — 82140 ASSAY OF AMMONIA: CPT

## 2023-03-07 PROCEDURE — 99204 OFFICE O/P NEW MOD 45 MIN: CPT | Performed by: PSYCHIATRY & NEUROLOGY

## 2023-03-07 NOTE — PROGRESS NOTES
Subjective      Patient ID: Tr Brito is a 32 y.o. male     Chief Complaint   Patient presents with   • Numbness     Tongue numbness   • Insomnia        History of Present Illness    32 y.o. male referred by Dr Hurtado for numbness of tongue.      Tongue feels heavy at end of day.  Denies trouble chewing or swallowing.      Sx wax and wane.  Present for a few months.     Severe anxiety.      Vivid dreams.      Resting improves sx.      Some tingling on left side of tongue.     Loud snoring, witnessed apnea, excessive daytime sleepiness     Reviewed medical records:    Sx of tongue feeling heavy, left arm incoordination, anxiety.     Tx with Pristiq.      MRI L/S, my review of films, 3/19/19 normal       Past Medical History:   Diagnosis Date   • Anxiety    • Colitis    • Depression    • Erectile dysfunction    • History of alcohol use disorder    • Hypertension      Family History   Problem Relation Age of Onset   • ADD / ADHD Mother    • Anemia Mother    • Diabetes Mother    • Hypertension Mother    • Mental illness Mother    • Obesity Mother    • Diabetes Father    • Hypertension Father    • Hyperlipidemia Father    • Arthritis Maternal Grandmother    • Heart attack Paternal Grandmother      Social History     Socioeconomic History   • Marital status: Single   Tobacco Use   • Smoking status: Never   • Smokeless tobacco: Never   Vaping Use   • Vaping Use: Unknown   Substance and Sexual Activity   • Alcohol use: Yes     Alcohol/week: 10.0 standard drinks     Types: 10 Cans of beer per week   • Drug use: No       Review of Systems   Constitutional: Negative for activity change, fatigue and unexpected weight change.   HENT: Negative for facial swelling, hearing loss, tinnitus, trouble swallowing and voice change.    Eyes: Negative for photophobia, pain and visual disturbance.   Respiratory: Negative for apnea, cough and choking.    Cardiovascular: Negative for chest pain.   Gastrointestinal: Negative for  "constipation, nausea and vomiting.   Endocrine: Negative for cold intolerance.   Genitourinary: Negative for difficulty urinating, frequency and urgency.   Musculoskeletal: Negative for arthralgias, back pain, gait problem, myalgias, neck pain and neck stiffness.   Skin: Negative for rash.   Allergic/Immunologic: Negative for immunocompromised state.   Neurological: Positive for speech difficulty and weakness. Negative for dizziness, tremors, seizures, syncope, facial asymmetry, light-headedness, numbness and headaches.   Hematological: Negative for adenopathy.   Psychiatric/Behavioral: Negative for confusion, decreased concentration, hallucinations and sleep disturbance. The patient is nervous/anxious.        Objective     Vitals:    03/07/23 1421   BP: 120/70   BP Location: Left arm   Patient Position: Sitting   Cuff Size: Adult   Pulse: 75   SpO2: 98%   Weight: 82.6 kg (182 lb)   Height: 185.4 cm (73\")       Neurologic Exam     Mental Status   Oriented to person, place, and time.   Speech: speech is normal   Level of consciousness: alert  Knowledge: good and consistent with education.   Normal comprehension.     Cranial Nerves   Cranial nerves II through XII intact.     CN II   Visual fields full to confrontation.   Visual acuity: normal  Right visual field deficit: none  Left visual field deficit: none     CN III, IV, VI   Pupils are equal, round, and reactive to light.  Extraocular motions are normal.   Nystagmus: none   Diplopia: none  Ophthalmoparesis: none  Upgaze: normal  Downgaze: normal  Conjugate gaze: present    CN V   Facial sensation intact.   Right corneal reflex: normal  Left corneal reflex: normal    CN VII   Right facial weakness: none  Left facial weakness: none    CN VIII   Hearing: intact    CN IX, X   Palate: symmetric  Right gag reflex: normal  Left gag reflex: normal    CN XI   Right sternocleidomastoid strength: normal  Left sternocleidomastoid strength: normal    CN XII   Tongue: not " atrophic  Fasciculations: absent  Tongue deviation: none    Motor Exam   Muscle bulk: normal  Overall muscle tone: normal    Strength   Strength 5/5 throughout.     Sensory Exam   Light touch normal.     Gait, Coordination, and Reflexes     Gait  Gait: normal    Tremor   Resting tremor: absent  Intention tremor: absent  Action tremor: absent    Reflexes   Reflexes 2+ except as noted.       Physical Exam  Eyes:      Extraocular Movements: EOM normal.      Pupils: Pupils are equal, round, and reactive to light.   Neurological:      Mental Status: He is oriented to person, place, and time.      Cranial Nerves: Cranial nerves 2-12 are intact.      Motor: Motor strength is normal.      Gait: Gait is intact.   Psychiatric:         Speech: Speech normal.         Admission on 01/21/2023, Discharged on 01/23/2023   Component Date Value Ref Range Status   • Glucose 01/21/2023 101 (H)  65 - 99 mg/dL Final   • BUN 01/21/2023 11  6 - 20 mg/dL Final   • Creatinine 01/21/2023 0.84  0.76 - 1.27 mg/dL Final   • Sodium 01/21/2023 138  136 - 145 mmol/L Final   • Potassium 01/21/2023 4.1  3.5 - 5.2 mmol/L Final   • Chloride 01/21/2023 101  98 - 107 mmol/L Final   • CO2 01/21/2023 23.0  22.0 - 29.0 mmol/L Final   • Calcium 01/21/2023 8.8  8.6 - 10.5 mg/dL Final   • Total Protein 01/21/2023 7.2  6.0 - 8.5 g/dL Final   • Albumin 01/21/2023 4.7  3.5 - 5.2 g/dL Final   • ALT (SGPT) 01/21/2023 38  1 - 41 U/L Final   • AST (SGOT) 01/21/2023 37  1 - 40 U/L Final   • Alkaline Phosphatase 01/21/2023 73  39 - 117 U/L Final   • Total Bilirubin 01/21/2023 0.5  0.0 - 1.2 mg/dL Final   • Globulin 01/21/2023 2.5  gm/dL Final    Calculated Result   • A/G Ratio 01/21/2023 1.9  g/dL Final   • BUN/Creatinine Ratio 01/21/2023 13.1  7.0 - 25.0 Final   • Anion Gap 01/21/2023 14.0  5.0 - 15.0 mmol/L Final   • eGFR 01/21/2023 120.3  >60.0 mL/min/1.73 Final   • Lipase 01/21/2023 26  13 - 60 U/L Final   • Color, UA 01/21/2023 Yellow  Yellow, Straw Final   •  Appearance, UA 01/21/2023 Clear  Clear Final   • pH, UA 01/21/2023 5.5  5.0 - 8.0 Final   • Specific Gravity, UA 01/21/2023 1.068 (H)  1.001 - 1.030 Final   • Glucose, UA 01/21/2023 Negative  Negative Final   • Ketones, UA 01/21/2023 15 mg/dL (1+) (A)  Negative Final   • Bilirubin, UA 01/21/2023 Negative  Negative Final   • Blood, UA 01/21/2023 Negative  Negative Final   • Protein, UA 01/21/2023 Negative  Negative Final   • Leuk Esterase, UA 01/21/2023 Negative  Negative Final   • Nitrite, UA 01/21/2023 Negative  Negative Final   • Urobilinogen, UA 01/21/2023 0.2 E.U./dL  0.2 - 1.0 E.U./dL Final   • Lactate 01/21/2023 0.9  0.5 - 2.0 mmol/L Final    Falsely depressed results may occur on samples drawn from patients receiving N-Acetylcysteine (NAC) or Metamizole.   • Magnesium 01/21/2023 1.5 (L)  1.6 - 2.6 mg/dL Final   • Phosphorus 01/21/2023 2.6  2.5 - 4.5 mg/dL Final   • THC, Screen, Urine 01/21/2023 Negative  Negative Final   • Phencyclidine (PCP), Urine 01/21/2023 Negative  Negative Final   • Cocaine Screen, Urine 01/21/2023 Negative  Negative Final   • Methamphetamine, Ur 01/21/2023 Negative  Negative Final   • Opiate Screen 01/21/2023 Positive (A)  Negative Final   • Amphetamine Screen, Urine 01/21/2023 Negative  Negative Final   • Benzodiazepine Screen, Urine 01/21/2023 Negative  Negative Final   • Tricyclic Antidepressants Screen 01/21/2023 Negative  Negative Final   • Methadone Screen, Urine 01/21/2023 Negative  Negative Final   • Barbiturates Screen, Urine 01/21/2023 Negative  Negative Final   • Oxycodone Screen, Urine 01/21/2023 Negative  Negative Final   • Propoxyphene Screen 01/21/2023 Negative  Negative Final   • Buprenorphine, Screen, Urine 01/21/2023 Negative  Negative Final   • COVID19 01/21/2023 Detected (C)  Not Detected - Ref. Range Final   • Influenza A PCR 01/21/2023 Not Detected  Not Detected Final   • Influenza B PCR 01/21/2023 Not Detected  Not Detected Final   • RSV, PCR 01/21/2023 Not  Detected  Not Detected Final   • WBC 01/21/2023 8.32  3.40 - 10.80 10*3/mm3 Final   • RBC 01/21/2023 4.57  4.14 - 5.80 10*6/mm3 Final   • Hemoglobin 01/21/2023 14.5  13.0 - 17.7 g/dL Final   • Hematocrit 01/21/2023 42.9  37.5 - 51.0 % Final   • MCV 01/21/2023 93.9  79.0 - 97.0 fL Final   • MCH 01/21/2023 31.7  26.6 - 33.0 pg Final   • MCHC 01/21/2023 33.8  31.5 - 35.7 g/dL Final   • RDW 01/21/2023 11.9 (L)  12.3 - 15.4 % Final   • RDW-SD 01/21/2023 41.2  37.0 - 54.0 fl Final   • MPV 01/21/2023 9.9  6.0 - 12.0 fL Final   • Platelets 01/21/2023 284  140 - 450 10*3/mm3 Final   • Neutrophil % 01/21/2023 85.4 (H)  42.7 - 76.0 % Final   • Lymphocyte % 01/21/2023 10.7 (L)  19.6 - 45.3 % Final   • Monocyte % 01/21/2023 3.5 (L)  5.0 - 12.0 % Final   • Eosinophil % 01/21/2023 0.0 (L)  0.3 - 6.2 % Final   • Basophil % 01/21/2023 0.2  0.0 - 1.5 % Final   • Immature Grans % 01/21/2023 0.2  0.0 - 0.5 % Final   • Neutrophils, Absolute 01/21/2023 7.10 (H)  1.70 - 7.00 10*3/mm3 Final   • Lymphocytes, Absolute 01/21/2023 0.89  0.70 - 3.10 10*3/mm3 Final   • Monocytes, Absolute 01/21/2023 0.29  0.10 - 0.90 10*3/mm3 Final   • Eosinophils, Absolute 01/21/2023 0.00  0.00 - 0.40 10*3/mm3 Final   • Basophils, Absolute 01/21/2023 0.02  0.00 - 0.20 10*3/mm3 Final   • Immature Grans, Absolute 01/21/2023 0.02  0.00 - 0.05 10*3/mm3 Final   • nRBC 01/21/2023 0.0  0.0 - 0.2 /100 WBC Final   • Magnesium 01/21/2023 1.7  1.6 - 2.6 mg/dL Final   • WBC 01/22/2023 5.53  3.40 - 10.80 10*3/mm3 Final   • RBC 01/22/2023 4.17  4.14 - 5.80 10*6/mm3 Final   • Hemoglobin 01/22/2023 13.4  13.0 - 17.7 g/dL Final   • Hematocrit 01/22/2023 38.7  37.5 - 51.0 % Final   • MCV 01/22/2023 92.8  79.0 - 97.0 fL Final   • MCH 01/22/2023 32.1  26.6 - 33.0 pg Final   • MCHC 01/22/2023 34.6  31.5 - 35.7 g/dL Final   • RDW 01/22/2023 12.2 (L)  12.3 - 15.4 % Final   • RDW-SD 01/22/2023 42.0  37.0 - 54.0 fl Final   • MPV 01/22/2023 10.1  6.0 - 12.0 fL Final   • Platelets  01/22/2023 234  140 - 450 10*3/mm3 Final   • Neutrophil % 01/22/2023 45.0  42.7 - 76.0 % Final   • Lymphocyte % 01/22/2023 48.3 (H)  19.6 - 45.3 % Final   • Monocyte % 01/22/2023 5.4  5.0 - 12.0 % Final   • Eosinophil % 01/22/2023 0.4  0.3 - 6.2 % Final   • Basophil % 01/22/2023 0.5  0.0 - 1.5 % Final   • Immature Grans % 01/22/2023 0.4  0.0 - 0.5 % Final   • Neutrophils, Absolute 01/22/2023 2.49  1.70 - 7.00 10*3/mm3 Final   • Lymphocytes, Absolute 01/22/2023 2.67  0.70 - 3.10 10*3/mm3 Final   • Monocytes, Absolute 01/22/2023 0.30  0.10 - 0.90 10*3/mm3 Final   • Eosinophils, Absolute 01/22/2023 0.02  0.00 - 0.40 10*3/mm3 Final   • Basophils, Absolute 01/22/2023 0.03  0.00 - 0.20 10*3/mm3 Final   • Immature Grans, Absolute 01/22/2023 0.02  0.00 - 0.05 10*3/mm3 Final   • nRBC 01/22/2023 0.0  0.0 - 0.2 /100 WBC Final   • Glucose 01/22/2023 86  65 - 99 mg/dL Final   • BUN 01/22/2023 9  6 - 20 mg/dL Final   • Creatinine 01/22/2023 0.82  0.76 - 1.27 mg/dL Final   • Sodium 01/22/2023 141  136 - 145 mmol/L Final   • Potassium 01/22/2023 4.7  3.5 - 5.2 mmol/L Final   • Chloride 01/22/2023 106  98 - 107 mmol/L Final   • CO2 01/22/2023 27.0  22.0 - 29.0 mmol/L Final   • Calcium 01/22/2023 8.3 (L)  8.6 - 10.5 mg/dL Final   • Total Protein 01/22/2023 5.9 (L)  6.0 - 8.5 g/dL Final   • Albumin 01/22/2023 3.7  3.5 - 5.2 g/dL Final   • ALT (SGPT) 01/22/2023 31  1 - 41 U/L Final   • AST (SGOT) 01/22/2023 27  1 - 40 U/L Final   • Alkaline Phosphatase 01/22/2023 56  39 - 117 U/L Final   • Total Bilirubin 01/22/2023 0.6  0.0 - 1.2 mg/dL Final   • Globulin 01/22/2023 2.2  gm/dL Final    Calculated Result   • A/G Ratio 01/22/2023 1.7  g/dL Final   • BUN/Creatinine Ratio 01/22/2023 11.0  7.0 - 25.0 Final   • Anion Gap 01/22/2023 8.0  5.0 - 15.0 mmol/L Final   • eGFR 01/22/2023 121.2  >60.0 mL/min/1.73 Final   • Magnesium 01/22/2023 2.2  1.6 - 2.6 mg/dL Final   • Phosphorus 01/22/2023 1.8 (C)  2.5 - 4.5 mg/dL Final   • Phosphorus  01/22/2023 2.7  2.5 - 4.5 mg/dL Final   • Case Report 01/22/2023    Final                    Value:Surgical Pathology Report                         Case: [id number]                                  Authorizing Provider:  Az Abbasi MD        Collected:           01/22/2023 01:15 PM          Ordering Location:     [facility]   Received:            01/23/2023 08:22 AM                                 [facility]                                                                  Pathologist:           Roberto Santillan MD                                                        Specimens:   1) - Gastric, Antrum                                                                                2) - Stomach, Lesser curvature                                                                      3) - Gastric, Fundus                                                                                4) - GE Junction                                                                          • Clinical Information 01/22/2023    Final                    Value:This result contains rich text formatting which cannot be displayed here.   • Final Diagnosis 01/22/2023    Final                    Value:This result contains rich text formatting which cannot be displayed here.   • Gross Description 01/22/2023    Final                    Value:This result contains rich text formatting which cannot be displayed here.   • Microscopic Description 01/22/2023    Final                    Value:This result contains rich text formatting which cannot be displayed here.         Assessment & Plan     Problem List Items Addressed This Visit        Sleep    MEME (obstructive sleep apnea)    Relevant Orders    Home Sleep Study       Symptoms and Signs    Facial numbness - Primary    Relevant Orders    MRI Brain With & Without Contrast          No follow-ups on file.

## 2023-03-08 LAB
ALBUMIN SERPL-MCNC: 5.1 G/DL (ref 3.5–5.2)
ALBUMIN/GLOB SERPL: 1.6 G/DL
ALP SERPL-CCNC: 64 U/L (ref 39–117)
ALT SERPL W P-5'-P-CCNC: 22 U/L (ref 1–41)
ANION GAP SERPL CALCULATED.3IONS-SCNC: 9.7 MMOL/L (ref 5–15)
AST SERPL-CCNC: 33 U/L (ref 1–40)
BILIRUB SERPL-MCNC: 0.5 MG/DL (ref 0–1.2)
BUN SERPL-MCNC: 9 MG/DL (ref 6–20)
BUN/CREAT SERPL: 9.7 (ref 7–25)
CALCIUM SPEC-SCNC: 10 MG/DL (ref 8.6–10.5)
CHLORIDE SERPL-SCNC: 99 MMOL/L (ref 98–107)
CO2 SERPL-SCNC: 29.3 MMOL/L (ref 22–29)
CREAT SERPL-MCNC: 0.93 MG/DL (ref 0.76–1.27)
EGFRCR SERPLBLD CKD-EPI 2021: 113.3 ML/MIN/1.73
FOLATE SERPL-MCNC: 11.4 NG/ML (ref 4.78–24.2)
GLOBULIN UR ELPH-MCNC: 3.2 GM/DL
GLUCOSE SERPL-MCNC: 90 MG/DL (ref 65–99)
POTASSIUM SERPL-SCNC: 5.1 MMOL/L (ref 3.5–5.2)
PROT SERPL-MCNC: 8.3 G/DL (ref 6–8.5)
RPR SER QL: NORMAL
SODIUM SERPL-SCNC: 138 MMOL/L (ref 136–145)
TSH SERPL DL<=0.05 MIU/L-ACNC: 0.41 UIU/ML (ref 0.27–4.2)
VIT B12 BLD-MCNC: 739 PG/ML (ref 211–946)

## 2023-03-10 ENCOUNTER — PATIENT ROUNDING (BHMG ONLY) (OUTPATIENT)
Dept: NEUROLOGY | Facility: CLINIC | Age: 33
End: 2023-03-10
Payer: COMMERCIAL

## 2023-03-10 NOTE — PROGRESS NOTES
A My-Chart message has been sent to the patient for PATIENT ROUNDING with [facility] [place]

## 2023-03-16 ENCOUNTER — TELEPHONE (OUTPATIENT)
Dept: NEUROLOGY | Facility: CLINIC | Age: 33
End: 2023-03-16
Payer: COMMERCIAL

## 2023-03-16 LAB
ACHR BIND AB SER-SCNC: <0.03 NMOL/L (ref 0–0.24)
ACHR BLOCK AB SER-ACNC: 15 % (ref 0–25)
ACHR MOD AB SER QL FC: 9 % (ref 0–45)

## 2023-03-16 NOTE — TELEPHONE ENCOUNTER
Called patient and left a VM, informed him that Dr. Palafox said his Lab results were normal. If further questions to give our office a call.

## 2023-03-16 NOTE — TELEPHONE ENCOUNTER
----- Message from Andrew Palafox MD sent at 3/16/2023 10:51 AM EDT -----  Notify pt labs are normal

## 2023-03-31 ENCOUNTER — OFFICE VISIT (OUTPATIENT)
Dept: INTERNAL MEDICINE | Facility: CLINIC | Age: 33
End: 2023-03-31
Payer: COMMERCIAL

## 2023-03-31 VITALS
WEIGHT: 185.5 LBS | BODY MASS INDEX: 24.47 KG/M2 | HEART RATE: 88 BPM | TEMPERATURE: 97.3 F | SYSTOLIC BLOOD PRESSURE: 126 MMHG | RESPIRATION RATE: 20 BRPM | DIASTOLIC BLOOD PRESSURE: 84 MMHG

## 2023-03-31 DIAGNOSIS — F41.9 ANXIETY: ICD-10-CM

## 2023-03-31 PROCEDURE — 99214 OFFICE O/P EST MOD 30 MIN: CPT | Performed by: INTERNAL MEDICINE

## 2023-03-31 RX ORDER — DESVENLAFAXINE 100 MG/1
100 TABLET, EXTENDED RELEASE ORAL DAILY
Qty: 30 TABLET | Refills: 3 | Status: SHIPPED | OUTPATIENT
Start: 2023-03-31

## 2023-03-31 NOTE — PROGRESS NOTES
Chief Complaint  Anxiety (Follow up)    Subjective    Tr Brito is a 33 y.o. male.     Tr Brito presents to [facility] for follow-up on anxiety.        History of Present Illness       1. Anxiety - The patient is about the same. He has noticed a big change so far with the long term medication. He saw the neurologist and is scheduled for an MRI in 04/2023. He was switched to an in-home sleep study. He is currently taking Pristiq and Klonopin. He has not been taking the Klonopin twice a day because he only takes it once a day. The Klonopin has improved his symptoms. He takes the Klonopin as needed. He does not feel messed up, but it makes him feel where he is not locked in. He has not taken the Klonopin on days he is off work. He does not want to take the Klonopin every day.        The following portions of the patient's history were reviewed and updated as appropriate: allergies, current medications, past family history, past medical history, past social history, past surgical history and problem list.    Review of Systems   A review of systems was performed, and the pertinent positives are noted in the HPI.        Objective   Vital Signs:   /84 (BP Location: Right arm, Patient Position: Sitting)   Pulse 88   Temp 97.3 °F (36.3 °C) (Infrared)   Resp 20   Wt 84.1 kg (185 lb 8 oz)   BMI 24.47 kg/m²     Body mass index is 24.47 kg/m².  BMI is within normal parameters. No other follow-up for BMI required.     Physical Exam  HENT:      Head: Normocephalic and atraumatic.      Mouth/Throat:      Mouth: Mucous membranes are moist.   Eyes:      Extraocular Movements: Extraocular movements intact.      Pupils: Pupils are equal, round, and reactive to light.   Cardiovascular:      Heart sounds: S1 normal and S2 normal. No murmur heard.    No friction rub. No gallop.   Pulmonary:      Breath sounds: Normal breath sounds. No wheezing or rhonchi.                Assessment and Plan  Diagnoses and all orders for this visit:      1. Anxiety.  - After review of history and physical, patient has elected to consider increasing the dose of the Pristiq, so we will go from 50 mg to 100 mg and we will continue him on the Klonopin dosage.    Patient will follow up with me in 4 weeks or earlier if indicated.        Follow Up   No follow-ups on file.  Patient was given instructions and counseling regarding his condition or for health maintenance advice. Please see specific information pulled into the AVS if appropriate.        Transcribed from ambient dictation for Jorje Hurtado MD by Gilda Alberts.  03/31/23   18:23 EDT    Patient or patient representative verbalized consent to the visit recording.  I have personally performed the services described in this document as transcribed by the above individual, and it is both accurate and complete.

## 2023-07-26 ENCOUNTER — OFFICE VISIT (OUTPATIENT)
Dept: INTERNAL MEDICINE | Facility: CLINIC | Age: 33
End: 2023-07-26
Payer: COMMERCIAL

## 2023-07-26 VITALS
OXYGEN SATURATION: 96 % | WEIGHT: 176.2 LBS | DIASTOLIC BLOOD PRESSURE: 78 MMHG | TEMPERATURE: 96.8 F | HEART RATE: 84 BPM | SYSTOLIC BLOOD PRESSURE: 130 MMHG | BODY MASS INDEX: 23.25 KG/M2

## 2023-07-26 DIAGNOSIS — R25.1 TREMOR OF LEFT HAND: ICD-10-CM

## 2023-07-26 DIAGNOSIS — F41.9 ANXIETY: Primary | ICD-10-CM

## 2023-07-26 DIAGNOSIS — G47.33 OSA (OBSTRUCTIVE SLEEP APNEA): ICD-10-CM

## 2023-07-26 PROCEDURE — 99214 OFFICE O/P EST MOD 30 MIN: CPT | Performed by: INTERNAL MEDICINE

## 2023-07-26 RX ORDER — CLONAZEPAM 1 MG/1
TABLET ORAL
Qty: 60 TABLET | Refills: 1 | Status: SHIPPED | OUTPATIENT
Start: 2023-07-26

## 2023-07-26 NOTE — PROGRESS NOTES
"Chief Complaint  Neurologic Problem and Anxiety    Subjective    Tr Brito is a 33 y.o. male.     Tr Brito presents to [facility] for neurological problem and anxiety.      History of Present Illness    The following portions of the patient's history were reviewed and updated as appropriate: allergies, current medications, past family history, past medical history, past social history, past surgical history, and problem list.    Neurological problems  The patient has an appointment with Dr. Andrew Palafox next week, the week of 07/31/2023. He has been having tremors in his hand. If he is sitting in a certain position, it is fine. If he uses his hands, his wrist hurts. If he writes, it feels extremely fatigued. His shoulders and neck are painful. This has been going on for years. Three to four years ago, when he came here in the clinic, he states that his left hand feels \"funky\" when he writes back then. It has progressively gotten worse. He will sleep and will wake up with left shoulder numbness. He notes that it is always on the left side. His left hand is his dominant hand. He \"worried himself to death\" over it. He states that he was initially dealing with some tingling and numbness. He states that Dr. Andrew Palafox is not significantly concerned. He is going to try to obtain a magnetic resonance imaging done. When he first saw Dr. Andrew Palafox, the numbness just happened. It started in the past few weeks. The numbness comes and goes. He has been sleeping with a wrist brace on to try to help himself not to be sleeping on his left side, which seemed to help a little bit. Today, 07/26/2023, it is definitely a little worse and it has been the past week. It is not consistent as far as severity. Some days, it is significantly severe when he is using his left hand.  Anxiety  The patient notes that he probably has severe anxiety. He tried to go on " a vacation a few weeks ago. He was in the middle of the road when he had a panic attack and thought that he is going to die. He states that his anxiety has been a problem for him. He has been experiencing anxiety and panic attacks, which affect his work and relationships. He states that he would rather talk to his girlfriend and his family. He has not been taking the clonazepam (Klonopin), as there had been no refills on it. The clonazepam (Klonopin) was calming him down tremendously. He took 1.5 tablets. He was only trying to take it once a day. He never felt intoxicated.  Sleep issues  The patient believes that he has sleep apnea. He never did the sleep study. He inquires if he can do the at-home sleep study.    Review of Systems   Neurological:  Positive for tremors and numbness.   Psychiatric/Behavioral:  The patient is nervous/anxious.      Objective   Vital Signs:   /78   Pulse 84   Temp 96.8 °F (36 °C) (Infrared)   Wt 79.9 kg (176 lb 3.2 oz)   SpO2 96%   BMI 23.25 kg/m²     Body mass index is 23.25 kg/m².  BMI is within normal parameters. No other follow-up for BMI required.     Physical Exam  Constitutional:       Comments: Patient is alert x3, in no distress. Mild anxiousness exhibited in the clinic; however, it is controlled.   Musculoskeletal:      Comments: In regards to bilateral upper and lower proximal and distal distributions, he has good muscle strength, good tone, but no signs of any weakness or inflammation, especially concerns with the left wrist, left hand, and left forearm.             Assessment and Plan  Diagnoses and all orders for this visit:    - The patient initially started on the clonazepam (Klonopin) 0.5 mg.  - I am pleased to report that the patient responded very well to this medication. He did not have a refill; however, he would like to go ahead and continue that.  - Because of the good response, I am going to increase his clonazepam (Klonopin) to 1 mg and he is to take  it twice a day as scheduled, so we will continue to monitor his symptoms.    2. Neurological symptoms and tremor, nonspecific  - I do not believe that there are any progressive or chronic neurological conditions; however, it is more suggestive of muscle fatigue.  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  - He does have a follow-up with Neurology in the next few weeks.  - Obviously, Dr. Andrew Palafox is the neurologist, so I have encouraged the patient to entertain those concerns with Dr. Andrew Palafox. In that way, we can verify from a neurologist if there are any major issues; however, no major issues or concerns per my assessment from a neurological standpoint.  - The patient will initiate with the clonazepam (Klonopin) twice a day.    3. Follow Up  - I will see him back at the 6 weeks follow-up.  - We will do that as a video visit to see how he is doing on both his anxiety and the tremors.  - Otherwise, everything looks good.          Follow Up   No follow-ups on file.  Patient was given instructions and counseling regarding his condition or for health maintenance advice. Please see specific information pulled into the AVS if appropriate.       Transcribed from ambient dictation for Jorje Hurtado MD by Eze Stokes.  07/27/23   00:49 EDT    Patient or patient representative verbalized consent to the visit recording.  I have personally performed the services described in this document as transcribed by the above individual, and it is both accurate and complete.

## 2023-09-20 DIAGNOSIS — N52.9 ERECTILE DYSFUNCTION, UNSPECIFIED ERECTILE DYSFUNCTION TYPE: ICD-10-CM

## 2023-09-20 NOTE — TELEPHONE ENCOUNTER
Caller: Tr Brito    Relationship: Self    Best call back number: [phone]     Requested Prescriptions:   Requested Prescriptions     Pending Prescriptions Disp Refills    sildenafil (Viagra) 100 MG tablet 30 tablet 3     Sig: Take 1 tablet by mouth Daily As Needed for Erectile Dysfunction.        Pharmacy where request should be sent: [facility] [id number] [address] AT [facility] [phone] [facility] [phone] FX     Last office visit with prescribing clinician: 7/26/2023   Last telemedicine visit with prescribing clinician: Visit date not found   Next office visit with prescribing clinician: 10/12/2023     Does the patient have less than a 3 day supply:  [x] Yes  [] No    Would you like a call back once the refill request has been completed: [x] Yes [] No    If the office needs to give you a call back, can they leave a voicemail: [x] Yes [] No    Les Villanueva Rep   09/20/23 14:23 EDT

## 2023-09-21 RX ORDER — SILDENAFIL 100 MG/1
100 TABLET, FILM COATED ORAL DAILY PRN
Qty: 30 TABLET | Refills: 3 | Status: SHIPPED | OUTPATIENT
Start: 2023-09-21

## 2023-10-27 DIAGNOSIS — F41.9 ANXIETY: ICD-10-CM

## 2023-10-27 RX ORDER — CLONAZEPAM 1 MG/1
TABLET ORAL
Qty: 60 TABLET | Refills: 3 | Status: SHIPPED | OUTPATIENT
Start: 2023-10-27

## 2023-10-27 NOTE — TELEPHONE ENCOUNTER
Caller: BritoTr    Relationship: Self    Best call back number: [phone]    Requested Prescriptions:   Requested Prescriptions     Pending Prescriptions Disp Refills    clonazePAM (KlonoPIN) 1 MG tablet 60 tablet 1     Sig: Take one tablet po bid        Pharmacy where request should be sent: [facility] [id number] [place] [id number] AT [facility] [phone] [facility] [phone] FX     Last office visit with prescribing clinician: 7/26/2023   Last telemedicine visit with prescribing clinician: Visit date not found   Next office visit with prescribing clinician: 12/14/2023     Additional details provided by patient: OUT. NEEDS RX SENT IN ASAP. NO MYCHART OR OFFICE VISITS AVAILABLE UNTIL 12/14/23. HE'S ON WAITLIST FOR SOONER APPOINTMENT.   PLEASE CALL THE PATIENT.   THANK YOU.    Does the patient have less than a 3 day supply:  [x] Yes  [] No    Would you like a call back once the refill request has been completed: [x] Yes [] No    If the office needs to give you a call back, can they leave a voicemail: [x] Yes [] No    Les Evans Rep   10/27/23 13:10 EDT

## 2023-11-02 ENCOUNTER — OFFICE VISIT (OUTPATIENT)
Dept: INTERNAL MEDICINE | Facility: CLINIC | Age: 33
End: 2023-11-02
Payer: COMMERCIAL

## 2023-11-02 VITALS
HEART RATE: 88 BPM | WEIGHT: 190.6 LBS | SYSTOLIC BLOOD PRESSURE: 146 MMHG | TEMPERATURE: 96.9 F | HEIGHT: 73 IN | DIASTOLIC BLOOD PRESSURE: 94 MMHG | BODY MASS INDEX: 25.26 KG/M2 | RESPIRATION RATE: 16 BRPM

## 2023-11-02 DIAGNOSIS — K62.5 RECTAL BLEEDING: Primary | ICD-10-CM

## 2023-11-02 DIAGNOSIS — F41.9 ANXIETY: ICD-10-CM

## 2023-11-02 DIAGNOSIS — R11.0 NAUSEA: ICD-10-CM

## 2023-11-02 PROCEDURE — 99214 OFFICE O/P EST MOD 30 MIN: CPT | Performed by: INTERNAL MEDICINE

## 2023-11-02 RX ORDER — ONDANSETRON HYDROCHLORIDE 8 MG/1
8 TABLET, FILM COATED ORAL EVERY 8 HOURS PRN
Qty: 25 TABLET | Refills: 2 | Status: SHIPPED | OUTPATIENT
Start: 2023-11-02

## 2023-11-02 NOTE — PROGRESS NOTES
Chief Complaint  Abdominal Pain and Diarrhea (Blood in stool)    Subjective    Tr Brito is a 33 y.o. male.     Tr Brito presents to [facility] for       History of Present Illness    1. Abdominal pain, diarrhea, and blood in the stool. - The patient reports that he woke up at 2:00 AM on 11/01/2023 with abdominal pain. He was tossing and turning due to the pain. He notes that he woke up at 5:00 AM with the same pain. The patient reports that he had diarrhea and noticed blood. He took pictures of the blood. The patient reports that the blood is bright red blood. The patient had similar symptoms when he had E. coli approximately 8 years ago. He had severe pain in the middle of the night, and he ended up going to the emergency room. He states that he was given pain medication. The patient reports that he had a scan done and was told that he had colitis. He was recommended to do a colonoscopy. The patient reports that 4 days later, he was told that his feces tested positive for E. coli. He mentions that the health department called him 1000 times. He ate some Mexican food the day before. The patient reports that he ate it for 2 days in a row. He had pizza that night with his girlfriend and he thought he was going to vomit. He was able to get himself to sleep.  He notes that the pain has subsided. The patient denies any blood in his stool after the episodes. The patient denies any fever or chills. He dealt with abdominal issues his whole life and was diagnosed with IBS. He reports that his mother was diagnosed with irritable bowel syndrome. The patient reports that he has dealt with constipation and diarrhea his whole life. He recalls that his last colonoscopy was 7 to 8 years ago.      The following portions of the patient's history were reviewed and updated as appropriate: allergies, current medications, past family history, past medical history,  "past social history, past surgical history, and problem list.    Review of Systems    Objective   Vital Signs:   /94 (BP Location: Right arm, Patient Position: Sitting, Cuff Size: Adult)   Pulse 88   Temp 96.9 °F (36.1 °C) (Infrared)   Resp 16   Ht 185.4 cm (73\")   Wt 86.5 kg (190 lb 9.6 oz)   BMI 25.15 kg/m²     Body mass index is 25.15 kg/m².  BMI is >= 25 and <30. (Overweight) The following options were offered after discussion;: none (medical contraindication)     Physical Exam  Constitutional:       General: He is not in acute distress.  HENT:      Head: Normocephalic and atraumatic.      Mouth/Throat:      Mouth: Mucous membranes are moist.      Pharynx: Oropharynx is clear.   Eyes:      Extraocular Movements: Extraocular movements intact.      Pupils: Pupils are equal, round, and reactive to light.   Neck:      Comments: No cervical or lymphadenopathy or goiter. No signs of anemia or pallor on exam.  Cardiovascular:      Rate and Rhythm: Normal rate and regular rhythm.      Heart sounds: S1 normal and S2 normal. No murmur heard.     No friction rub. No gallop.   Pulmonary:      Effort: Pulmonary effort is normal.      Breath sounds: No wheezing or rhonchi.   Musculoskeletal:      Cervical back: Neck supple.   Lymphadenopathy:      Cervical: No cervical adenopathy.   Neurological:      Mental Status: He is alert and oriented to person, place, and time.               Assessment and Plan  Diagnoses and all orders for this visit:    1. Rectal bleeding.  - Obviously, the patient did show me cell phone pictures, which demonstrated a bright red per rectum on tissue paper and also blood in the stool on the toilet. I did discuss with patient that he does need a colonoscopy just to rule out any other issues or concerns here today. I am going to hold off on doing any stool cultures and the fact that he clinically looks very well here, but I did anticipatory guidance. I told him that if for some reason blood " in stool returns, he feels sick, worse, shortness of breath, dizziness, chest pain, worsening nausea, vomiting, diarrhea, dehydration, he should proceed to the emergency room. I did tell him to eat a bland diet, no spicy foods and to wait for us to schedule this scheduled urgently, so he should have an appointment here soon for a colonoscopy.        Follow Up   No follow-ups on file.  Patient was given instructions and counseling regarding his condition or for health maintenance advice. Please see specific information pulled into the AVS if appropriate.      Transcribed from ambient dictation for Jorje Hurtado MD by Lawanda Mejía.  11/02/23   17:40 EDT    Patient or patient representative verbalized consent to the visit recording.  I have personally performed the services described in this document as transcribed by the above individual, and it is both accurate and complete.

## 2023-11-13 ENCOUNTER — OUTSIDE FACILITY SERVICE (OUTPATIENT)
Dept: GASTROENTEROLOGY | Facility: CLINIC | Age: 33
End: 2023-11-13
Payer: COMMERCIAL

## 2023-11-13 PROCEDURE — 88305 TISSUE EXAM BY PATHOLOGIST: CPT

## 2023-11-13 PROCEDURE — 45380 COLONOSCOPY AND BIOPSY: CPT | Performed by: INTERNAL MEDICINE

## 2023-11-14 ENCOUNTER — LAB REQUISITION (OUTPATIENT)
Dept: LAB | Facility: HOSPITAL | Age: 33
End: 2023-11-14
Payer: COMMERCIAL

## 2023-11-14 DIAGNOSIS — K64.8 OTHER HEMORRHOIDS: ICD-10-CM

## 2023-11-14 DIAGNOSIS — R10.13 EPIGASTRIC PAIN: ICD-10-CM

## 2023-11-14 DIAGNOSIS — K62.5 HEMORRHAGE OF ANUS AND RECTUM: ICD-10-CM

## 2023-11-15 LAB — REF LAB TEST METHOD: NORMAL

## 2023-12-29 ENCOUNTER — TELEPHONE (OUTPATIENT)
Dept: INTERNAL MEDICINE | Facility: CLINIC | Age: 33
End: 2023-12-29
Payer: COMMERCIAL

## 2023-12-29 DIAGNOSIS — G47.33 OSA (OBSTRUCTIVE SLEEP APNEA): Primary | ICD-10-CM

## 2023-12-29 NOTE — TELEPHONE ENCOUNTER
Caller: Tr Brito    Relationship: Self    Best call back number: [phone]     What is the medical concern/diagnosis: THE PATIENT IS STILL HAVING SLEEP ISSUES.     What specialty or service is being requested: IN HOME SLEEP STUDY    What is the provider, practice or medical service name: NO PREFERENCE    Any additional details: THE PATIENT HAD A FEW ISSUES WITH THE SCHEDULING OF THE PREVIOUS REFERRAL DUE TO IT GETTING RESCHEDULED AND MISSING THE APPOINTMENT. HE IS NOW NEEDING A NEW ONE PUT IN. PLEASE LET HIM KNOW IF THIS CAN BE DONE OR IF HE NEEDS AN APPOINTMENT WITH DR. COCHRAN.

## 2024-01-03 ENCOUNTER — TELEPHONE (OUTPATIENT)
Dept: INTERNAL MEDICINE | Facility: CLINIC | Age: 34
End: 2024-01-03
Payer: COMMERCIAL

## 2024-01-03 NOTE — TELEPHONE ENCOUNTER
Caller: Nathaly Brito    Relationship: Self    Best call back number: [phone]    What is the best time to reach you: ANYTIME    Who are you requesting to speak with (clinical staff, provider,  specific staff member): CLINICAL STAFF    Do you know the name of the person who called: PATIENT/ NATHALY    What was the call regarding: SLEEP STUDY WAS SCHEDULED FOR MARCH 11;  CAN THIS BE SENT AS URGENT SO HE CAN HAVE IT SOONER    Is it okay if the provider responds through MyChart:

## 2024-01-04 ENCOUNTER — OFFICE VISIT (OUTPATIENT)
Dept: SLEEP MEDICINE | Facility: HOSPITAL | Age: 34
End: 2024-01-04
Payer: COMMERCIAL

## 2024-01-04 VITALS
OXYGEN SATURATION: 97 % | HEART RATE: 90 BPM | SYSTOLIC BLOOD PRESSURE: 137 MMHG | DIASTOLIC BLOOD PRESSURE: 93 MMHG | WEIGHT: 198.8 LBS | BODY MASS INDEX: 26.35 KG/M2 | HEIGHT: 73 IN

## 2024-01-04 DIAGNOSIS — R29.818 SUSPECTED SLEEP APNEA: ICD-10-CM

## 2024-01-04 DIAGNOSIS — R06.83 SNORING: Primary | ICD-10-CM

## 2024-01-04 DIAGNOSIS — I10 HYPERTENSION, UNSPECIFIED TYPE: ICD-10-CM

## 2024-01-04 DIAGNOSIS — G47.19 EXCESSIVE DAYTIME SLEEPINESS: ICD-10-CM

## 2024-01-04 NOTE — PROGRESS NOTES
Chief Complaint  Sleeping Problem    Subjective     History of Present Illness:  Tr Brito is a 33 y.o. male with a history of hypertension, alcohol use disorder, anxiety, and depression.  The patient is referred by Jorje Hurtado MD primary care.  Review of patient's self-reported questionnaire notes symptoms including snoring, witnessed apnea, daytime sleepiness and fatigue, frequent awakening, morning headaches, nonrestorative sleep, heartburn/reflux, sore throat, history of broken nose, nasal allergies, grinding teeth, difficulty staying asleep, sleep talking, nightmares, night sweats, memory loss, and lack of concentration.  Patient reports symptoms have been ongoing for more than 5 years.  He has a variable sleep schedule.  Patient estimates an average of 7-8 hours of sleep per night and it takes approximately 15 minutes for him to get to sleep.  Patient does take 2-hour naps.  He denies use of tobacco, drinks alcohol 2-3 times per week.  Patient drinks 1 regular cup coffee, and 1 regular cola daily.  The patient's significant other reports witnessed episodes of apnea, and pauses in breathing.  Additionally the patient is observed with heavy snoring which occurs continuously, nightly, and in all sleeping positions.  Patient is also seen talking in his sleep.    Further details are as follows:    [place] Scale is (out of 24): Total score: 11     Estimated average amount of sleep per night: 7-8 hours  Number of times he wakes up at night: 6-8 times  Difficulty falling back asleep: sometimes  It usually takes 15 minutes to go to sleep.  He feels sleepy upon waking up: yes  Rotating or night shift work: no    Drowsiness/Sleepiness:  He exhibits the following:  excessive daytime sleepiness  excessive daytime fatigue  falls asleep watching TV  difficulty driving due to sleepiness  Occasional naps    Snoring/Breathing:  He exhibits the following:  loud snoring, snores in all sleep positions, quits breathing  at night, awakens gasping for breath, sore throat when waking up in the morning, and morning headaches    Head Injury:  He exhibits the following:  Yes. Type: not diagnosed     Reflux:  He describes the following:  wakes up at night with a sour taste or burning sensation in chest  takes medication for reflux    Narcolepsy:  He exhibits the following:  feeling of paralysis while going to sleep or coming out of sleep    RLS/PLMs:  He describes the following:  moves or jerks during sleep    Insomnia:  He describes the following:  frequent awakenings    Parasomnia:  He exhibits the following:  sleep talks  frequent nightmares  excessive sweating while sleeping    Weight:       01/04/24 0916   Weight: 90.2 kg (198 lb 12.8 oz)      Weight change in the last year:  gain: 5 lbs    The patient's relevant past medical, surgical, family, and social history reviewed and updated in Epic as appropriate.    Review of Systems   Constitutional:  Positive for fatigue.   HENT:  Positive for congestion, nosebleeds, rhinorrhea, sinus pressure, sneezing and sore throat.    Eyes:  Positive for redness.   Respiratory:  Positive for apnea.    Cardiovascular: Negative.    Gastrointestinal:  Positive for blood in stool, constipation, diarrhea and rectal pain.   Endocrine: Negative.    Genitourinary: Negative.    Musculoskeletal:  Positive for back pain.   Skin: Negative.    Allergic/Immunologic: Positive for environmental allergies.   Neurological: Negative.    Hematological: Negative.    Psychiatric/Behavioral:  Positive for sleep disturbance. The patient is nervous/anxious.    All other systems reviewed and are negative.      PMH:    Past Medical History:   Diagnosis Date    Anxiety     Colitis     Depression     Erectile dysfunction     History of alcohol use disorder     Hypertension      Past Surgical History:   Procedure Laterality Date    ENDOSCOPY N/A 01/22/2023    Procedure: ESOPHAGOGASTRODUODENOSCOPY WITH BIOPSY;  Surgeon: Ayleen  "Az LUTZ MD;  Location: [facility] ENDOSCOPY;  Service: Gastroenterology;  Laterality: N/A;    NOSE SURGERY      fracture    WISDOM TOOTH EXTRACTION         No Known Allergies    MEDS:  Prior to Admission medications    Medication Sig Start Date End Date Taking? Authorizing Provider   cetirizine (zyrTEC) 10 MG tablet Take 1 tablet by mouth As Needed.    Sarai Calles MD   clonazePAM (KlonoPIN) 1 MG tablet Take one tablet po bid 10/27/23   Jorje Hurtado MD   omeprazole (priLOSEC) 40 MG capsule Take 1 capsule by mouth Daily. 1/19/23   Sarai Calles MD   ondansetron (Zofran) 8 MG tablet Take 1 tablet by mouth Every 8 (Eight) Hours As Needed for Nausea or Vomiting. 11/2/23   Jorje Hurtado MD   sildenafil (Viagra) 100 MG tablet Take 1 tablet by mouth Daily As Needed for Erectile Dysfunction. 9/21/23   Jorje Hurtado MD       FH:  Family History   Problem Relation Age of Onset    Sleep apnea Mother     ADD / ADHD Mother     Anemia Mother     Diabetes Mother     Hypertension Mother     Mental illness Mother     Obesity Mother     Diabetes Father     Hypertension Father     Hyperlipidemia Father     Arthritis Maternal Grandmother     Heart attack Paternal Grandmother        Objective   Vital Signs:  /93 (BP Location: Left arm, Patient Position: Sitting)   Pulse 90   Ht 185.4 cm (73\")   Wt 90.2 kg (198 lb 12.8 oz)   SpO2 97%   BMI 26.23 kg/m²     BMI is >= 25 and <30. (Overweight) The following options were offered after discussion;: gain/loss 5 lbs. Walks at work quite a bit and is very active.        Physical Exam  Vitals reviewed.   Constitutional:       Appearance: Normal appearance.   HENT:      Head: Normocephalic and atraumatic.      Nose: Nose normal.      Mouth/Throat:      Mouth: Mucous membranes are moist.   Cardiovascular:      Rate and Rhythm: Normal rate and regular rhythm.      Heart sounds: No murmur heard.     No friction rub. No gallop.   Pulmonary:      Effort: Pulmonary effort is " normal. No respiratory distress.      Breath sounds: Normal breath sounds. No wheezing or rhonchi.   Neurological:      Mental Status: He is alert and oriented to person, place, and time.   Psychiatric:         Behavior: Behavior normal.         Mallampati Score: III (soft and hard palate and base of uvula visible)    Result Review :              Assessment and Plan  Tr Brito is a 33 y.o. male with a past medical history of hypertension, alcohol use disorder, anxiety, and depression who presents for further evaluation of excessive daytime sleepiness and fatigue, nonrestorative sleep, and concerns for sleep disordered breathing and obstructive sleep apnea. The patient's symptoms, particularly snoring, apnea are concerning for significant sleep disordered breathing and obstructive sleep apnea. We will obtain a home sleep test for further evaluation.  The patient will return for follow-up and recommendations after test.  I have discussed weight loss as it pertains to obstructive sleep apnea.    Diagnoses and all orders for this visit:    1. Snoring (Primary)  -     Home Sleep Study; Future    2. Suspected sleep apnea  -     Home Sleep Study; Future    3. Excessive daytime sleepiness  -     Home Sleep Study; Future    4. Hypertension, unspecified type  -     Home Sleep Study; Future                 I discussed the consequences of uncontrolled sleep apnea including hypertension, heart disease, diabetes, stroke, and dementia. I further discussed sleep apnea therapeutic options including CPAP, Weight loss, Oral dental appliance, and surgery.         Follow Up  Return for Follow up after study.  Patient was given instructions and counseling regarding his condition or for health maintenance advice. Please see specific information pulled into the AVS if appropriate.     ZAFAR Jamison, [facility]-BC  Pulmonology, Critical Care, and Sleep Medicine

## 2024-01-18 ENCOUNTER — TELEPHONE (OUTPATIENT)
Dept: INTERNAL MEDICINE | Facility: CLINIC | Age: 34
End: 2024-01-18
Payer: COMMERCIAL

## 2024-01-18 DIAGNOSIS — F41.9 ANXIETY: Primary | ICD-10-CM

## 2024-01-18 DIAGNOSIS — F41.9 ANXIETY: ICD-10-CM

## 2024-01-18 RX ORDER — CLONAZEPAM 1 MG/1
TABLET ORAL
Qty: 60 TABLET | Refills: 3 | Status: SHIPPED | OUTPATIENT
Start: 2024-01-18

## 2024-01-18 RX ORDER — DIAZEPAM 5 MG/1
5 TABLET ORAL EVERY 12 HOURS PRN
Qty: 30 TABLET | Refills: 0 | Status: SHIPPED | OUTPATIENT
Start: 2024-01-18

## 2024-01-18 NOTE — TELEPHONE ENCOUNTER
PATIENT CALLED [facility] IN [place] AND THEY DO HAVE THE CLONAZEPAM. [facility], [address] [place] [address], [phone]

## 2024-01-18 NOTE — TELEPHONE ENCOUNTER
If there is a shortage and Klonopin temporarily we can try either switching to something such as Valium

## 2024-01-18 NOTE — TELEPHONE ENCOUNTER
Please tell patient that I have called in the value of 5 mg    #30     No refill    Smaller quantity to allow patient to try to see if this works

## 2024-01-18 NOTE — TELEPHONE ENCOUNTER
Caller: rT Brito    Relationship: Self    Best call back number: [phone]     Which medication are you concerned about: clonazePAM (KlonoPIN) 1 MG tablet     What are your concerns: PATIENT STATES HE HAS CALLED MULTIPLE PHARMACIES AND THEY DO NOT HAVE THIS MEDICATION IN STOCK AND HE IS OUT SO HE WOULD LIKE TO KNOW WHAT HE IS NEEDING TO DO.

## 2024-01-19 DIAGNOSIS — N52.9 ERECTILE DYSFUNCTION, UNSPECIFIED ERECTILE DYSFUNCTION TYPE: ICD-10-CM

## 2024-01-19 RX ORDER — SILDENAFIL 100 MG/1
100 TABLET, FILM COATED ORAL DAILY PRN
Qty: 30 TABLET | Refills: 3 | Status: SHIPPED | OUTPATIENT
Start: 2024-01-19

## 2024-01-19 NOTE — TELEPHONE ENCOUNTER
Caller: Tr Brito    Relationship: Self    Best call back number: [phone]     Requested Prescriptions:   Requested Prescriptions     Pending Prescriptions Disp Refills    sildenafil (Viagra) 100 MG tablet 30 tablet 3     Sig: Take 1 tablet by mouth Daily As Needed for Erectile Dysfunction.        Pharmacy where request should be sent: [facility] [id number] [address] AT [facility] [phone] [facility] [phone]      Last office visit with prescribing clinician: 11/2/2023   Last telemedicine visit with prescribing clinician: Visit date not found   Next office visit with prescribing clinician: Visit date not found     Additional details provided by patient: PATIENT IS OUT OF MEDICATION.    Does the patient have less than a 3 day supply:  [x] Yes  [] No    Would you like a call back once the refill request has been completed: [x] Yes [] No    If the office needs to give you a call back, can they leave a voicemail: [x] Yes [] No    Les Villanueva Rep   01/19/24 15:12 EST

## 2024-01-31 ENCOUNTER — TELEPHONE (OUTPATIENT)
Dept: INTERNAL MEDICINE | Facility: CLINIC | Age: 34
End: 2024-01-31
Payer: COMMERCIAL

## 2024-01-31 RX ORDER — ONDANSETRON 8 MG/1
8 TABLET, ORALLY DISINTEGRATING ORAL EVERY 8 HOURS PRN
Qty: 25 TABLET | Refills: 2 | Status: SHIPPED | OUTPATIENT
Start: 2024-01-31

## 2024-01-31 NOTE — TELEPHONE ENCOUNTER
Caller: Tr Brito    Relationship: Self    Best call back number: [phone]    Which medication are you concerned about: DARRELL    Who prescribed you this medication: DR COCHRAN    What are your concerns: PATIENT CAN NOT HOLD DOWN THE TABLET LONG ENOUGH FOR IT TO WORK AND WOULD LIKE THE SUBLINGUAL DISSOLVING ZOFRAN. PATIENT THROAT IS VERY SWOLLEN FROM VOMITING EVERY 30 MINUTES. PLEASE ADVISE

## 2024-02-13 ENCOUNTER — HOSPITAL ENCOUNTER (OUTPATIENT)
Dept: SLEEP MEDICINE | Facility: HOSPITAL | Age: 34
Discharge: HOME | End: 2024-02-13
Admitting: NURSE PRACTITIONER
Payer: COMMERCIAL

## 2024-02-13 VITALS — WEIGHT: 198 LBS | BODY MASS INDEX: 26.24 KG/M2 | HEIGHT: 73 IN

## 2024-02-13 DIAGNOSIS — R29.818 SUSPECTED SLEEP APNEA: ICD-10-CM

## 2024-02-13 DIAGNOSIS — R06.83 SNORING: ICD-10-CM

## 2024-02-13 DIAGNOSIS — G47.19 EXCESSIVE DAYTIME SLEEPINESS: ICD-10-CM

## 2024-02-13 DIAGNOSIS — I10 HYPERTENSION, UNSPECIFIED TYPE: ICD-10-CM

## 2024-02-13 PROCEDURE — 95806 SLEEP STUDY UNATT&RESP EFFT: CPT

## 2024-02-15 DIAGNOSIS — G47.33 OSA (OBSTRUCTIVE SLEEP APNEA): ICD-10-CM

## 2024-02-16 ENCOUNTER — HOSPITAL ENCOUNTER (EMERGENCY)
Facility: HOSPITAL | Age: 34
Discharge: HOME | End: 2024-02-16
Attending: EMERGENCY MEDICINE
Payer: COMMERCIAL

## 2024-02-16 ENCOUNTER — APPOINTMENT (OUTPATIENT)
Dept: GENERAL RADIOLOGY | Facility: HOSPITAL | Age: 34
End: 2024-02-16
Payer: COMMERCIAL

## 2024-02-16 ENCOUNTER — APPOINTMENT (OUTPATIENT)
Dept: CT IMAGING | Facility: HOSPITAL | Age: 34
End: 2024-02-16
Payer: COMMERCIAL

## 2024-02-16 VITALS
HEIGHT: 73 IN | SYSTOLIC BLOOD PRESSURE: 128 MMHG | RESPIRATION RATE: 16 BRPM | WEIGHT: 185 LBS | DIASTOLIC BLOOD PRESSURE: 82 MMHG | HEART RATE: 72 BPM | OXYGEN SATURATION: 98 % | BODY MASS INDEX: 24.52 KG/M2 | TEMPERATURE: 97.9 F

## 2024-02-16 DIAGNOSIS — R74.8 ELEVATED LIVER ENZYMES: ICD-10-CM

## 2024-02-16 DIAGNOSIS — F10.920 ALCOHOLIC INTOXICATION WITHOUT COMPLICATION: Primary | ICD-10-CM

## 2024-02-16 LAB
ALBUMIN SERPL-MCNC: 5 G/DL (ref 3.5–5.2)
ALBUMIN/GLOB SERPL: 1.4 G/DL
ALP SERPL-CCNC: 154 U/L (ref 39–117)
ALT SERPL W P-5'-P-CCNC: 243 U/L (ref 1–41)
ANION GAP SERPL CALCULATED.3IONS-SCNC: 18.9 MMOL/L (ref 5–15)
AST SERPL-CCNC: 205 U/L (ref 1–40)
BASOPHILS # BLD AUTO: 0.03 10*3/MM3 (ref 0–0.2)
BASOPHILS NFR BLD AUTO: 0.7 % (ref 0–1.5)
BILIRUB SERPL-MCNC: 0.8 MG/DL (ref 0–1.2)
BUN SERPL-MCNC: 11 MG/DL (ref 6–20)
BUN/CREAT SERPL: 11.7 (ref 7–25)
CALCIUM SPEC-SCNC: 9.3 MG/DL (ref 8.6–10.5)
CHLORIDE SERPL-SCNC: 92 MMOL/L (ref 98–107)
CO2 SERPL-SCNC: 23.1 MMOL/L (ref 22–29)
CREAT SERPL-MCNC: 0.94 MG/DL (ref 0.76–1.27)
DEPRECATED RDW RBC AUTO: 42.7 FL (ref 37–54)
EGFRCR SERPLBLD CKD-EPI 2021: 111.1 ML/MIN/1.73
EOSINOPHIL # BLD AUTO: 0 10*3/MM3 (ref 0–0.4)
EOSINOPHIL NFR BLD AUTO: 0 % (ref 0.3–6.2)
ERYTHROCYTE [DISTWIDTH] IN BLOOD BY AUTOMATED COUNT: 12.3 % (ref 12.3–15.4)
ETHANOL BLD-MCNC: 342 MG/DL (ref 0–10)
ETHANOL UR QL: 0.34 %
GLOBULIN UR ELPH-MCNC: 3.5 GM/DL
GLUCOSE SERPL-MCNC: 88 MG/DL (ref 65–99)
HCT VFR BLD AUTO: 47.3 % (ref 37.5–51)
HGB BLD-MCNC: 16.8 G/DL (ref 13–17.7)
HOLD SPECIMEN: NORMAL
HOLD SPECIMEN: NORMAL
IMM GRANULOCYTES # BLD AUTO: 0.01 10*3/MM3 (ref 0–0.05)
IMM GRANULOCYTES NFR BLD AUTO: 0.2 % (ref 0–0.5)
LYMPHOCYTES # BLD AUTO: 0.98 10*3/MM3 (ref 0.7–3.1)
LYMPHOCYTES NFR BLD AUTO: 22 % (ref 19.6–45.3)
MAGNESIUM SERPL-MCNC: 1.9 MG/DL (ref 1.6–2.6)
MCH RBC QN AUTO: 33.3 PG (ref 26.6–33)
MCHC RBC AUTO-ENTMCNC: 35.5 G/DL (ref 31.5–35.7)
MCV RBC AUTO: 93.8 FL (ref 79–97)
MONOCYTES # BLD AUTO: 0.19 10*3/MM3 (ref 0.1–0.9)
MONOCYTES NFR BLD AUTO: 4.3 % (ref 5–12)
NEUTROPHILS NFR BLD AUTO: 3.24 10*3/MM3 (ref 1.7–7)
NEUTROPHILS NFR BLD AUTO: 72.8 % (ref 42.7–76)
NRBC BLD AUTO-RTO: 0 /100 WBC (ref 0–0.2)
PLATELET # BLD AUTO: 186 10*3/MM3 (ref 140–450)
PMV BLD AUTO: 9.3 FL (ref 6–12)
POTASSIUM SERPL-SCNC: 4.5 MMOL/L (ref 3.5–5.2)
PROT SERPL-MCNC: 8.5 G/DL (ref 6–8.5)
RBC # BLD AUTO: 5.04 10*6/MM3 (ref 4.14–5.8)
SODIUM SERPL-SCNC: 134 MMOL/L (ref 136–145)
TROPONIN T SERPL HS-MCNC: <6 NG/L
WBC NRBC COR # BLD AUTO: 4.45 10*3/MM3 (ref 3.4–10.8)
WHOLE BLOOD HOLD COAG: NORMAL
WHOLE BLOOD HOLD SPECIMEN: NORMAL

## 2024-02-16 PROCEDURE — 70450 CT HEAD/BRAIN W/O DYE: CPT

## 2024-02-16 PROCEDURE — 80053 COMPREHEN METABOLIC PANEL: CPT

## 2024-02-16 PROCEDURE — 84484 ASSAY OF TROPONIN QUANT: CPT

## 2024-02-16 PROCEDURE — 96361 HYDRATE IV INFUSION ADD-ON: CPT

## 2024-02-16 PROCEDURE — 25810000003 SODIUM CHLORIDE 0.9 % SOLUTION

## 2024-02-16 PROCEDURE — 83735 ASSAY OF MAGNESIUM: CPT

## 2024-02-16 PROCEDURE — 71045 X-RAY EXAM CHEST 1 VIEW: CPT

## 2024-02-16 PROCEDURE — 25010000002 ONDANSETRON PER 1 MG

## 2024-02-16 PROCEDURE — 95806 SLEEP STUDY UNATT&RESP EFFT: CPT | Performed by: INTERNAL MEDICINE

## 2024-02-16 PROCEDURE — 82077 ASSAY SPEC XCP UR&BREATH IA: CPT

## 2024-02-16 PROCEDURE — 85025 COMPLETE CBC W/AUTO DIFF WBC: CPT

## 2024-02-16 PROCEDURE — 93005 ELECTROCARDIOGRAM TRACING: CPT

## 2024-02-16 PROCEDURE — 99284 EMERGENCY DEPT VISIT MOD MDM: CPT

## 2024-02-16 PROCEDURE — 96374 THER/PROPH/DIAG INJ IV PUSH: CPT

## 2024-02-16 RX ORDER — ONDANSETRON 2 MG/ML
4 INJECTION INTRAMUSCULAR; INTRAVENOUS ONCE
Status: COMPLETED | OUTPATIENT
Start: 2024-02-16 | End: 2024-02-16

## 2024-02-16 RX ORDER — SCOLOPAMINE TRANSDERMAL SYSTEM 1 MG/1
1 PATCH, EXTENDED RELEASE TRANSDERMAL
Qty: 10 PATCH | Refills: 0 | Status: SHIPPED | OUTPATIENT
Start: 2024-02-16 | End: 2024-02-18

## 2024-02-16 RX ORDER — SCOLOPAMINE TRANSDERMAL SYSTEM 1 MG/1
1 PATCH, EXTENDED RELEASE TRANSDERMAL ONCE
Status: DISCONTINUED | OUTPATIENT
Start: 2024-02-16 | End: 2024-02-16 | Stop reason: HOSPADM

## 2024-02-16 RX ORDER — PROMETHAZINE HYDROCHLORIDE 25 MG/1
12.5 TABLET ORAL EVERY 6 HOURS PRN
Qty: 10 TABLET | Refills: 0 | Status: SHIPPED | OUTPATIENT
Start: 2024-02-16 | End: 2024-02-18

## 2024-02-16 RX ORDER — SODIUM CHLORIDE 0.9 % (FLUSH) 0.9 %
10 SYRINGE (ML) INJECTION AS NEEDED
Status: DISCONTINUED | OUTPATIENT
Start: 2024-02-16 | End: 2024-02-16 | Stop reason: HOSPADM

## 2024-02-16 RX ADMIN — SCOLOPAMINE TRANSDERMAL SYSTEM 1 PATCH: 1 PATCH, EXTENDED RELEASE TRANSDERMAL at 19:46

## 2024-02-16 RX ADMIN — SODIUM CHLORIDE 1000 ML: 9 INJECTION, SOLUTION INTRAVENOUS at 19:47

## 2024-02-16 RX ADMIN — ONDANSETRON 4 MG: 2 INJECTION INTRAMUSCULAR; INTRAVENOUS at 19:46

## 2024-02-17 ENCOUNTER — HOSPITAL ENCOUNTER (INPATIENT)
Facility: HOSPITAL | Age: 34
LOS: 1 days | Discharge: NURSING, REHAB OR LONG-TERM CARE | DRG: 897 | End: 2024-02-18
Attending: PSYCHIATRY & NEUROLOGY | Admitting: PSYCHIATRY & NEUROLOGY
Payer: COMMERCIAL

## 2024-02-17 ENCOUNTER — HOSPITAL ENCOUNTER (EMERGENCY)
Facility: HOSPITAL | Age: 34
Discharge: OTHER INSTITUTION | End: 2024-02-17
Attending: STUDENT IN AN ORGANIZED HEALTH CARE EDUCATION/TRAINING PROGRAM
Payer: COMMERCIAL

## 2024-02-17 VITALS
RESPIRATION RATE: 18 BRPM | HEIGHT: 73 IN | TEMPERATURE: 98.1 F | HEART RATE: 64 BPM | OXYGEN SATURATION: 97 % | SYSTOLIC BLOOD PRESSURE: 137 MMHG | BODY MASS INDEX: 24.52 KG/M2 | DIASTOLIC BLOOD PRESSURE: 94 MMHG | WEIGHT: 185 LBS

## 2024-02-17 DIAGNOSIS — F10.930 ALCOHOL WITHDRAWAL SYNDROME WITHOUT COMPLICATION: Primary | ICD-10-CM

## 2024-02-17 PROBLEM — F10.10 ALCOHOL ABUSE: Status: ACTIVE | Noted: 2024-02-17

## 2024-02-17 LAB
ALBUMIN SERPL-MCNC: 4.4 G/DL (ref 3.5–5.2)
ALBUMIN/GLOB SERPL: 1.6 G/DL
ALP SERPL-CCNC: 133 U/L (ref 39–117)
ALT SERPL W P-5'-P-CCNC: 200 U/L (ref 1–41)
AMPHET+METHAMPHET UR QL: NEGATIVE
AMPHETAMINES UR QL: NEGATIVE
ANION GAP SERPL CALCULATED.3IONS-SCNC: 14.7 MMOL/L (ref 5–15)
AST SERPL-CCNC: 202 U/L (ref 1–40)
BACTERIA UR QL AUTO: ABNORMAL /HPF
BARBITURATES UR QL SCN: NEGATIVE
BASOPHILS # BLD AUTO: 0.03 10*3/MM3 (ref 0–0.2)
BASOPHILS NFR BLD AUTO: 0.7 % (ref 0–1.5)
BENZODIAZ UR QL SCN: POSITIVE
BILIRUB SERPL-MCNC: 1 MG/DL (ref 0–1.2)
BILIRUB UR QL STRIP: NEGATIVE
BUN SERPL-MCNC: 13 MG/DL (ref 6–20)
BUN/CREAT SERPL: 14.9 (ref 7–25)
BUPRENORPHINE SERPL-MCNC: NEGATIVE NG/ML
CALCIUM SPEC-SCNC: 9.2 MG/DL (ref 8.6–10.5)
CANNABINOIDS SERPL QL: NEGATIVE
CHLORIDE SERPL-SCNC: 96 MMOL/L (ref 98–107)
CLARITY UR: CLEAR
CO2 SERPL-SCNC: 25.3 MMOL/L (ref 22–29)
COCAINE UR QL: NEGATIVE
COLOR UR: ABNORMAL
CREAT SERPL-MCNC: 0.87 MG/DL (ref 0.76–1.27)
DEPRECATED RDW RBC AUTO: 43.3 FL (ref 37–54)
EGFRCR SERPLBLD CKD-EPI 2021: 118.3 ML/MIN/1.73
EOSINOPHIL # BLD AUTO: 0.02 10*3/MM3 (ref 0–0.4)
EOSINOPHIL NFR BLD AUTO: 0.5 % (ref 0.3–6.2)
ERYTHROCYTE [DISTWIDTH] IN BLOOD BY AUTOMATED COUNT: 12.5 % (ref 12.3–15.4)
ETHANOL BLD-MCNC: <10 MG/DL (ref 0–10)
ETHANOL UR QL: <0.01 %
FENTANYL UR-MCNC: NEGATIVE NG/ML
GLOBULIN UR ELPH-MCNC: 2.8 GM/DL
GLUCOSE SERPL-MCNC: 105 MG/DL (ref 65–99)
GLUCOSE UR STRIP-MCNC: NEGATIVE MG/DL
HCT VFR BLD AUTO: 40.1 % (ref 37.5–51)
HGB BLD-MCNC: 14.3 G/DL (ref 13–17.7)
HGB UR QL STRIP.AUTO: NEGATIVE
HYALINE CASTS UR QL AUTO: ABNORMAL /LPF
IMM GRANULOCYTES # BLD AUTO: 0.01 10*3/MM3 (ref 0–0.05)
IMM GRANULOCYTES NFR BLD AUTO: 0.2 % (ref 0–0.5)
KETONES UR QL STRIP: ABNORMAL
LEUKOCYTE ESTERASE UR QL STRIP.AUTO: ABNORMAL
LIPASE SERPL-CCNC: 238 U/L (ref 13–60)
LYMPHOCYTES # BLD AUTO: 0.69 10*3/MM3 (ref 0.7–3.1)
LYMPHOCYTES NFR BLD AUTO: 15.5 % (ref 19.6–45.3)
MAGNESIUM SERPL-MCNC: 1.6 MG/DL (ref 1.6–2.6)
MCH RBC QN AUTO: 33.3 PG (ref 26.6–33)
MCHC RBC AUTO-ENTMCNC: 35.7 G/DL (ref 31.5–35.7)
MCV RBC AUTO: 93.3 FL (ref 79–97)
METHADONE UR QL SCN: NEGATIVE
MONOCYTES # BLD AUTO: 0.38 10*3/MM3 (ref 0.1–0.9)
MONOCYTES NFR BLD AUTO: 8.6 % (ref 5–12)
NEUTROPHILS NFR BLD AUTO: 3.31 10*3/MM3 (ref 1.7–7)
NEUTROPHILS NFR BLD AUTO: 74.5 % (ref 42.7–76)
NITRITE UR QL STRIP: NEGATIVE
NRBC BLD AUTO-RTO: 0 /100 WBC (ref 0–0.2)
OPIATES UR QL: NEGATIVE
OXYCODONE UR QL SCN: NEGATIVE
PCP UR QL SCN: NEGATIVE
PH UR STRIP.AUTO: 5.5 [PH] (ref 5–8)
PLATELET # BLD AUTO: 146 10*3/MM3 (ref 140–450)
PMV BLD AUTO: 9.3 FL (ref 6–12)
POTASSIUM SERPL-SCNC: 4 MMOL/L (ref 3.5–5.2)
PROT SERPL-MCNC: 7.2 G/DL (ref 6–8.5)
PROT UR QL STRIP: ABNORMAL
RBC # BLD AUTO: 4.3 10*6/MM3 (ref 4.14–5.8)
RBC # UR STRIP: ABNORMAL /HPF
REF LAB TEST METHOD: ABNORMAL
SODIUM SERPL-SCNC: 136 MMOL/L (ref 136–145)
SP GR UR STRIP: 1.02 (ref 1–1.03)
SQUAMOUS #/AREA URNS HPF: ABNORMAL /HPF
TRICYCLICS UR QL SCN: NEGATIVE
UROBILINOGEN UR QL STRIP: ABNORMAL
WBC # UR STRIP: ABNORMAL /HPF
WBC NRBC COR # BLD AUTO: 4.44 10*3/MM3 (ref 3.4–10.8)

## 2024-02-17 PROCEDURE — 36415 COLL VENOUS BLD VENIPUNCTURE: CPT

## 2024-02-17 PROCEDURE — 96374 THER/PROPH/DIAG INJ IV PUSH: CPT

## 2024-02-17 PROCEDURE — 85025 COMPLETE CBC W/AUTO DIFF WBC: CPT | Performed by: STUDENT IN AN ORGANIZED HEALTH CARE EDUCATION/TRAINING PROGRAM

## 2024-02-17 PROCEDURE — 25010000002 DIAZEPAM PER 5 MG: Performed by: STUDENT IN AN ORGANIZED HEALTH CARE EDUCATION/TRAINING PROGRAM

## 2024-02-17 PROCEDURE — 25010000002 ONDANSETRON PER 1 MG: Performed by: PHYSICIAN ASSISTANT

## 2024-02-17 PROCEDURE — 96375 TX/PRO/DX INJ NEW DRUG ADDON: CPT

## 2024-02-17 PROCEDURE — 83735 ASSAY OF MAGNESIUM: CPT | Performed by: STUDENT IN AN ORGANIZED HEALTH CARE EDUCATION/TRAINING PROGRAM

## 2024-02-17 PROCEDURE — 82077 ASSAY SPEC XCP UR&BREATH IA: CPT | Performed by: STUDENT IN AN ORGANIZED HEALTH CARE EDUCATION/TRAINING PROGRAM

## 2024-02-17 PROCEDURE — 25810000003 SODIUM CHLORIDE 0.9 % SOLUTION: Performed by: STUDENT IN AN ORGANIZED HEALTH CARE EDUCATION/TRAINING PROGRAM

## 2024-02-17 PROCEDURE — 93005 ELECTROCARDIOGRAM TRACING: CPT | Performed by: STUDENT IN AN ORGANIZED HEALTH CARE EDUCATION/TRAINING PROGRAM

## 2024-02-17 PROCEDURE — 93005 ELECTROCARDIOGRAM TRACING: CPT | Performed by: PSYCHIATRY & NEUROLOGY

## 2024-02-17 PROCEDURE — HZ2ZZZZ DETOXIFICATION SERVICES FOR SUBSTANCE ABUSE TREATMENT: ICD-10-PCS | Performed by: PSYCHIATRY & NEUROLOGY

## 2024-02-17 PROCEDURE — 63710000001 PROMETHAZINE PER 12.5 MG: Performed by: STUDENT IN AN ORGANIZED HEALTH CARE EDUCATION/TRAINING PROGRAM

## 2024-02-17 PROCEDURE — 80307 DRUG TEST PRSMV CHEM ANLYZR: CPT | Performed by: STUDENT IN AN ORGANIZED HEALTH CARE EDUCATION/TRAINING PROGRAM

## 2024-02-17 PROCEDURE — 81001 URINALYSIS AUTO W/SCOPE: CPT | Performed by: STUDENT IN AN ORGANIZED HEALTH CARE EDUCATION/TRAINING PROGRAM

## 2024-02-17 PROCEDURE — 99285 EMERGENCY DEPT VISIT HI MDM: CPT

## 2024-02-17 PROCEDURE — 25010000002 LORAZEPAM PER 2 MG: Performed by: STUDENT IN AN ORGANIZED HEALTH CARE EDUCATION/TRAINING PROGRAM

## 2024-02-17 PROCEDURE — 80053 COMPREHEN METABOLIC PANEL: CPT | Performed by: STUDENT IN AN ORGANIZED HEALTH CARE EDUCATION/TRAINING PROGRAM

## 2024-02-17 PROCEDURE — 83690 ASSAY OF LIPASE: CPT | Performed by: STUDENT IN AN ORGANIZED HEALTH CARE EDUCATION/TRAINING PROGRAM

## 2024-02-17 RX ORDER — LORAZEPAM 2 MG/1
2 TABLET ORAL
Status: DISPENSED | OUTPATIENT
Start: 2024-02-17 | End: 2024-02-18

## 2024-02-17 RX ORDER — LORAZEPAM 0.5 MG/1
0.5 TABLET ORAL
Status: DISCONTINUED | OUTPATIENT
Start: 2024-02-21 | End: 2024-02-18 | Stop reason: HOSPADM

## 2024-02-17 RX ORDER — ONDANSETRON 4 MG/1
8 TABLET, ORALLY DISINTEGRATING ORAL EVERY 8 HOURS PRN
Status: CANCELLED | OUTPATIENT
Start: 2024-02-17

## 2024-02-17 RX ORDER — CHLORDIAZEPOXIDE HYDROCHLORIDE 25 MG/1
50 CAPSULE, GELATIN COATED ORAL ONCE
Status: COMPLETED | OUTPATIENT
Start: 2024-02-17 | End: 2024-02-17

## 2024-02-17 RX ORDER — ECHINACEA PURPUREA EXTRACT 125 MG
2 TABLET ORAL AS NEEDED
Status: DISCONTINUED | OUTPATIENT
Start: 2024-02-17 | End: 2024-02-18 | Stop reason: HOSPADM

## 2024-02-17 RX ORDER — ONDANSETRON 4 MG/1
4 TABLET, ORALLY DISINTEGRATING ORAL EVERY 6 HOURS PRN
Status: DISCONTINUED | OUTPATIENT
Start: 2024-02-17 | End: 2024-02-18 | Stop reason: HOSPADM

## 2024-02-17 RX ORDER — BENZONATATE 100 MG/1
100 CAPSULE ORAL 3 TIMES DAILY PRN
Status: DISCONTINUED | OUTPATIENT
Start: 2024-02-17 | End: 2024-02-18 | Stop reason: HOSPADM

## 2024-02-17 RX ORDER — DIAZEPAM 5 MG/ML
5 INJECTION, SOLUTION INTRAMUSCULAR; INTRAVENOUS ONCE
Status: COMPLETED | OUTPATIENT
Start: 2024-02-17 | End: 2024-02-17

## 2024-02-17 RX ORDER — MULTIPLE VITAMINS W/ MINERALS TAB 9MG-400MCG
1 TAB ORAL DAILY
Status: DISCONTINUED | OUTPATIENT
Start: 2024-02-17 | End: 2024-02-18

## 2024-02-17 RX ORDER — LORAZEPAM 2 MG/1
2 TABLET ORAL
Status: DISCONTINUED | OUTPATIENT
Start: 2024-02-18 | End: 2024-02-18 | Stop reason: HOSPADM

## 2024-02-17 RX ORDER — HYDROXYZINE 50 MG/1
50 TABLET, FILM COATED ORAL EVERY 6 HOURS PRN
Status: DISCONTINUED | OUTPATIENT
Start: 2024-02-17 | End: 2024-02-18

## 2024-02-17 RX ORDER — LORAZEPAM 1 MG/1
1 TABLET ORAL EVERY 4 HOURS PRN
Status: DISCONTINUED | OUTPATIENT
Start: 2024-02-20 | End: 2024-02-18

## 2024-02-17 RX ORDER — LORAZEPAM 0.5 MG/1
0.5 TABLET ORAL EVERY 4 HOURS PRN
Status: DISCONTINUED | OUTPATIENT
Start: 2024-02-21 | End: 2024-02-18

## 2024-02-17 RX ORDER — FAMOTIDINE 20 MG/1
20 TABLET, FILM COATED ORAL 2 TIMES DAILY PRN
Status: DISCONTINUED | OUTPATIENT
Start: 2024-02-17 | End: 2024-02-18 | Stop reason: HOSPADM

## 2024-02-17 RX ORDER — LORAZEPAM 2 MG/ML
1 INJECTION INTRAMUSCULAR ONCE
Status: COMPLETED | OUTPATIENT
Start: 2024-02-17 | End: 2024-02-17

## 2024-02-17 RX ORDER — ALUMINA, MAGNESIA, AND SIMETHICONE 2400; 2400; 240 MG/30ML; MG/30ML; MG/30ML
15 SUSPENSION ORAL EVERY 6 HOURS PRN
Status: DISCONTINUED | OUTPATIENT
Start: 2024-02-17 | End: 2024-02-18 | Stop reason: HOSPADM

## 2024-02-17 RX ORDER — LOPERAMIDE HYDROCHLORIDE 2 MG/1
2 CAPSULE ORAL
Status: DISCONTINUED | OUTPATIENT
Start: 2024-02-17 | End: 2024-02-18 | Stop reason: HOSPADM

## 2024-02-17 RX ORDER — LORAZEPAM 1 MG/1
1 TABLET ORAL
Status: DISCONTINUED | OUTPATIENT
Start: 2024-02-20 | End: 2024-02-18 | Stop reason: HOSPADM

## 2024-02-17 RX ORDER — BENZTROPINE MESYLATE 1 MG/ML
1 INJECTION INTRAMUSCULAR; INTRAVENOUS ONCE AS NEEDED
Status: DISCONTINUED | OUTPATIENT
Start: 2024-02-17 | End: 2024-02-18 | Stop reason: HOSPADM

## 2024-02-17 RX ORDER — PROMETHAZINE HYDROCHLORIDE 25 MG/1
12.5 TABLET ORAL EVERY 6 HOURS PRN
Status: DISCONTINUED | OUTPATIENT
Start: 2024-02-17 | End: 2024-02-18 | Stop reason: HOSPADM

## 2024-02-17 RX ORDER — IBUPROFEN 400 MG/1
400 TABLET ORAL EVERY 6 HOURS PRN
Status: DISCONTINUED | OUTPATIENT
Start: 2024-02-17 | End: 2024-02-18 | Stop reason: HOSPADM

## 2024-02-17 RX ORDER — CLONAZEPAM 0.5 MG/1
1 TABLET ORAL 2 TIMES DAILY PRN
Status: CANCELLED | OUTPATIENT
Start: 2024-02-17

## 2024-02-17 RX ORDER — LORAZEPAM 2 MG/1
2 TABLET ORAL EVERY 4 HOURS PRN
Status: DISCONTINUED | OUTPATIENT
Start: 2024-02-18 | End: 2024-02-18

## 2024-02-17 RX ORDER — PROMETHAZINE HYDROCHLORIDE 12.5 MG/1
12.5 TABLET ORAL ONCE
Status: COMPLETED | OUTPATIENT
Start: 2024-02-17 | End: 2024-02-17

## 2024-02-17 RX ORDER — BENZTROPINE MESYLATE 1 MG/1
2 TABLET ORAL ONCE AS NEEDED
Status: DISCONTINUED | OUTPATIENT
Start: 2024-02-17 | End: 2024-02-18 | Stop reason: HOSPADM

## 2024-02-17 RX ORDER — TRAZODONE HYDROCHLORIDE 50 MG/1
50 TABLET ORAL NIGHTLY PRN
Status: DISCONTINUED | OUTPATIENT
Start: 2024-02-17 | End: 2024-02-18 | Stop reason: HOSPADM

## 2024-02-17 RX ORDER — ONDANSETRON 2 MG/ML
4 INJECTION INTRAMUSCULAR; INTRAVENOUS ONCE
Status: COMPLETED | OUTPATIENT
Start: 2024-02-17 | End: 2024-02-17

## 2024-02-17 RX ORDER — MULTIVITAMIN WITH IRON
2 TABLET ORAL DAILY
Status: DISCONTINUED | OUTPATIENT
Start: 2024-02-17 | End: 2024-02-18 | Stop reason: HOSPADM

## 2024-02-17 RX ORDER — SCOLOPAMINE TRANSDERMAL SYSTEM 1 MG/1
1 PATCH, EXTENDED RELEASE TRANSDERMAL
Status: DISCONTINUED | OUTPATIENT
Start: 2024-02-18 | End: 2024-02-18

## 2024-02-17 RX ORDER — ACETAMINOPHEN 325 MG/1
650 TABLET ORAL EVERY 6 HOURS PRN
Status: DISCONTINUED | OUTPATIENT
Start: 2024-02-17 | End: 2024-02-18 | Stop reason: HOSPADM

## 2024-02-17 RX ORDER — CLONAZEPAM 1 MG/1
1 TABLET ORAL 2 TIMES DAILY PRN
COMMUNITY
End: 2024-02-18

## 2024-02-17 RX ADMIN — Medication 1 TABLET: at 18:06

## 2024-02-17 RX ADMIN — HYDROXYZINE HYDROCHLORIDE 50 MG: 50 TABLET ORAL at 18:05

## 2024-02-17 RX ADMIN — Medication 100 MG: at 18:05

## 2024-02-17 RX ADMIN — TRAZODONE HYDROCHLORIDE 50 MG: 50 TABLET ORAL at 21:11

## 2024-02-17 RX ADMIN — DIAZEPAM 5 MG: 5 INJECTION INTRAMUSCULAR; INTRAVENOUS at 15:04

## 2024-02-17 RX ADMIN — ONDANSETRON 4 MG: 2 INJECTION INTRAMUSCULAR; INTRAVENOUS at 11:00

## 2024-02-17 RX ADMIN — SODIUM CHLORIDE 1000 ML: 9 INJECTION, SOLUTION INTRAVENOUS at 10:17

## 2024-02-17 RX ADMIN — Medication 2 TABLET: at 18:05

## 2024-02-17 RX ADMIN — LORAZEPAM 2 MG: 2 TABLET ORAL at 18:06

## 2024-02-17 RX ADMIN — CHLORDIAZEPOXIDE HYDROCHLORIDE 50 MG: 25 CAPSULE ORAL at 10:18

## 2024-02-17 RX ADMIN — LORAZEPAM 1 MG: 2 INJECTION INTRAMUSCULAR; INTRAVENOUS at 10:18

## 2024-02-17 RX ADMIN — PROMETHAZINE HYDROCHLORIDE 12.5 MG: 12.5 TABLET ORAL at 10:15

## 2024-02-17 RX ADMIN — FAMOTIDINE 20 MG: 20 TABLET ORAL at 18:06

## 2024-02-17 NOTE — DISCHARGE INSTRUCTIONS
You are being sent to the [facility] in [facility] for treatment of your alcohol use.  Please report there immediately after discharge from the ED.

## 2024-02-17 NOTE — ED PROVIDER NOTES
Subjective  History of Present Illness:    This is a 33-year-old male, history of anxiety, colitis, alcohol use disorder presenting the ER today for evaluation of dizziness.  Ongoing for several days.  Intermittent in nature.  Has had feelings of lightheadedness and a couple episodes of vertigo-like symptoms.  No current vertigo-like symptoms.  Worse with standing.  No chest pain or shortness of air.  No known fevers.  Had some mild nasal congestion.  No cough.  Send nausea vomiting diarrhea and feels dehydrated.  No abdominal pain.  No urinary symptoms.  No known sick contacts.      Nurses Notes reviewed and agree, including vitals, allergies, social history and prior medical history.     REVIEW OF SYSTEMS: All systems reviewed and not pertinent unless noted.  Review of Systems   Constitutional:  Negative for fever.   HENT:  Positive for congestion.    Respiratory:  Negative for cough and shortness of breath.    Cardiovascular:  Negative for chest pain.   Gastrointestinal:  Positive for diarrhea, nausea and vomiting. Negative for abdominal pain.   Neurological:  Positive for dizziness and light-headedness.   All other systems reviewed and are negative.      Past Medical History:   Diagnosis Date    Anxiety     Colitis     Depression     Erectile dysfunction     History of alcohol use disorder     Hypertension        Allergies:    Patient has no known allergies.      Past Surgical History:   Procedure Laterality Date    ENDOSCOPY N/A 01/22/2023    Procedure: ESOPHAGOGASTRODUODENOSCOPY WITH BIOPSY;  Surgeon: Az Abbasi MD;  [facility];  Service: Gastroenterology;  Laterality: N/A;    NOSE SURGERY      fracture    WISDOM TOOTH EXTRACTION           Social History     Socioeconomic History    Marital status: Single   Tobacco Use    Smoking status: Never    Smokeless tobacco: Never   Vaping Use    Vaping Use: Unknown   Substance and Sexual Activity    Alcohol use: Yes     Alcohol/week: 10.0 standard  "drinks of alcohol     Types: 10 Cans of beer per week    Drug use: No         Family History   Problem Relation Age of Onset    Sleep apnea Mother     ADD / ADHD Mother     Anemia Mother     Diabetes Mother     Hypertension Mother     Mental illness Mother     Obesity Mother     Diabetes Father     Hypertension Father     Hyperlipidemia Father     Arthritis Maternal Grandmother     Heart attack Paternal Grandmother        Objective  Physical Exam:  /85 (Patient Position: Standing)   Pulse 92   Temp 97.9 °F (36.6 °C) (Oral)   Resp 16   Ht 185.4 cm (73\")   Wt 83.9 kg (185 lb)   SpO2 96%   BMI 24.41 kg/m²      Physical Exam  Vitals and nursing note reviewed.   Constitutional:       General: He is not in acute distress.     Appearance: Normal appearance. He is not ill-appearing, toxic-appearing or diaphoretic.   HENT:      Head: Normocephalic and atraumatic.      Nose: Nose normal.      Mouth/Throat:      Mouth: Mucous membranes are moist.      Pharynx: Oropharynx is clear.   Eyes:      Extraocular Movements: Extraocular movements intact.   Cardiovascular:      Rate and Rhythm: Normal rate and regular rhythm.      Pulses: Normal pulses.      Heart sounds: Normal heart sounds.   Pulmonary:      Effort: Pulmonary effort is normal. No respiratory distress.      Breath sounds: Normal breath sounds. No stridor. No wheezing, rhonchi or rales.   Abdominal:      General: There is no distension.      Palpations: Abdomen is soft.      Tenderness: There is no abdominal tenderness. There is no guarding.   Musculoskeletal:         General: Normal range of motion.      Cervical back: Normal range of motion and neck supple.   Skin:     General: Skin is warm and dry.      Capillary Refill: Capillary refill takes less than 2 seconds.   Neurological:      General: No focal deficit present.      Mental Status: He is alert and oriented to person, place, and time.      Cranial Nerves: No cranial nerve deficit.      Sensory: No " sensory deficit.      Motor: No weakness.      Coordination: Coordination normal.   Psychiatric:         Mood and Affect: Mood normal.         Behavior: Behavior normal.         Thought Content: Thought content normal.         Judgment: Judgment normal.               Procedures    ED Course:    ED Course as of 02/16/24 2023 Fri Feb 16, 2024 2012 EKG was directly visualized by myself, interpretations as documented in hospital course.  Normal ventricular rate of 68 bpm, sinus rhythm, normal EKG. [JK]      ED Course User Index  [JK] Alon Pugh MD       Lab Results (last 24 hours)       Procedure Component Value Units Date/Time    CBC & Differential [[id number]]  (Abnormal) Collected: 02/16/24 1911    Specimen: Blood Updated: 02/16/24 1921    Narrative:      The following orders were created for panel order CBC & Differential.  Procedure                               Abnormality         Status                     ---------                               -----------         ------                     CBC Auto Differential[[id number]]        Abnormal            Final result                 Please view results for these tests on the individual orders.    Comprehensive Metabolic Panel [[id number]]  (Abnormal) Collected: 02/16/24 1911    Specimen: Blood Updated: 02/16/24 1947     Glucose 88 mg/dL      BUN 11 mg/dL      Creatinine 0.94 mg/dL      Sodium 134 mmol/L      Potassium 4.5 mmol/L      Chloride 92 mmol/L      CO2 23.1 mmol/L      Calcium 9.3 mg/dL      Total Protein 8.5 g/dL      Albumin 5.0 g/dL      ALT (SGPT) 243 U/L      AST (SGOT) 205 U/L      Alkaline Phosphatase 154 U/L      Total Bilirubin 0.8 mg/dL      Globulin 3.5 gm/dL      A/G Ratio 1.4 g/dL      BUN/Creatinine Ratio 11.7     Anion Gap 18.9 mmol/L      eGFR 111.1 mL/min/1.73     Narrative:      GFR Normal >60  Chronic Kidney Disease <60  Kidney Failure <15      Single High Sensitivity Troponin T [[id number]]  (Normal) Collected: 02/16/24 1911     Specimen: Blood Updated: 02/16/24 1941     HS Troponin T <6 ng/L     Narrative:      High Sensitive Troponin T Reference Range:  <14.0 ng/L- Negative Female for AMI  <22.0 ng/L- Negative Male for AMI  >=14 - Abnormal Female indicating possible myocardial injury.  >=22 - Abnormal Male indicating possible myocardial injury.   Clinicians would have to utilize clinical acumen, EKG, Troponin, and serial changes to determine if it is an Acute Myocardial Infarction or myocardial injury due to an underlying chronic condition.         Magnesium [[id number]]  (Normal) Collected: 02/16/24 1911    Specimen: Blood Updated: 02/16/24 1947     Magnesium 1.9 mg/dL     CBC Auto Differential [[id number]]  (Abnormal) Collected: 02/16/24 1911    Specimen: Blood Updated: 02/16/24 1921     WBC 4.45 10*3/mm3      RBC 5.04 10*6/mm3      Hemoglobin 16.8 g/dL      Hematocrit 47.3 %      MCV 93.8 fL      MCH 33.3 pg      MCHC 35.5 g/dL      RDW 12.3 %      RDW-SD 42.7 fl      MPV 9.3 fL      Platelets 186 10*3/mm3      Neutrophil % 72.8 %      Lymphocyte % 22.0 %      Monocyte % 4.3 %      Eosinophil % 0.0 %      Basophil % 0.7 %      Immature Grans % 0.2 %      Neutrophils, Absolute 3.24 10*3/mm3      Lymphocytes, Absolute 0.98 10*3/mm3      Monocytes, Absolute 0.19 10*3/mm3      Eosinophils, Absolute 0.00 10*3/mm3      Basophils, Absolute 0.03 10*3/mm3      Immature Grans, Absolute 0.01 10*3/mm3      nRBC 0.0 /100 WBC     Ethanol [[id number]]  (Abnormal) Collected: 02/16/24 1911    Specimen: Blood Updated: 02/16/24 1947     Ethanol 342 mg/dL      Ethanol % 0.342 %     Narrative:      This result is for medical use only and should not be used for forensic purposes.             CT Head Without Contrast    Result Date: 2/16/2024  FINAL REPORT TECHNIQUE: null CLINICAL HISTORY: vertigo sx, headaches and dizziness COMPARISON: null FINDINGS: CT Head WO Contrast COMPARISON: None FINDINGS: No acute intracranial hemorrhage. No evidence of acute  infarction. No mass-effect or midline shift. No hydrocephalus. Visualized paranasal sinuses are clear. The mastoid air cells are clear. The visible orbits are normal. No acute fracture. Unremarkable soft tissues.     Impression: IMPRESSION: No acute intracranial findings. Authenticated and Electronically Signed by Pantera Bower MD on 02/16/2024 07:54:12 PM        [facility]     Amount and/or Complexity of Data Reviewed  Decide to obtain previous medical records or to obtain history from someone other than the patient: yes  Independent visualization of images, tracings, or specimens: yes        Initial impression of presenting illness: This is a 33-year-old male present today for evaluation of dizziness    DDX: includes but is not limited to: Vertigo, otitis media, otitis externa, dehydration, orthostatic hypotension, electrolyte derangement, viral illness, others    Patient arrives hemodynamically stable afebrile nontachycardic nonhypoxic nontoxic-appearing with vitals interpreted by myself.     Pertinent features from physical exam: Well-appearing nontoxic 33-year-old male.  Abdomen soft nontender nonreactive, cardiac auscultation regular rate rhythm lungs were clear bilaterally.  Neuroexam normal, cranial nerves II through XII grossly intact.  Equal upper and lower extremity strength.  Coordination intact upper lower extremities.  No sensory deficits.  Pupils PEERLA without nystagmus..  There was odor of alcohol on the patient on my initial assessment.    Initial diagnostic plan: CBC CMP EtOH magnesium troponin EKG chest x-ray urinalysis point-of-care glucose CT head without contrast and, orthostatic vitals    Results from initial plan were reviewed and interpreted by me revealing CBC unremarkable.  CT head per radiology interpretation is unremarkable.  CMP reveals transaminitis without an obstructive pattern.  He had no abdominal tenderness, this is likely related to the patient's chronic alcohol abuse.  He also has an  elevated anion gap of 18.9 which is likely secondary to alcohol intoxication.  His alcohol level is 342.  Magnesium of 1.9 and a troponin of 6.  Chest x-ray per my interpretation with no acute process.  EKG revealed normal sinus rhythm rate of 68.    Diagnostic information from other sources: Old record reviewed    Interventions / Re-evaluation: Zofran scopolamine 1 L fluids.  Patient felt better after interventions.  He has a safe ride home with partner who is here who will be driving him home and watching after him.    Results/clinical rationale were discussed with patient at bedside.    Consultations/Discussion of results with other physicians: Discussed with behavioral health, they are going to provide the patient resources for outpatient substance use.    Disposition plan: Discharge.  Follow-up with PCP and resources provided by behavioral health.  He did not wish to go inpatient for alcohol detox.  He does admit that he has a problem with alcohol use and wishes to follow-up outpatient for this.  Encouraged follow-up for elevated liver enzymes and having these repeated.  -----    Final diagnoses:   Alcoholic intoxication without complication   Elevated liver enzymes          Levi Keane PA-C  02/16/24 2023

## 2024-02-17 NOTE — ED PROVIDER NOTES
Subjective  History of Present Illness:    Chief Complaint:   Chief Complaint   Patient presents with    Delirium Tremens (DTS)      History of Present Illness: Tr Brito is a 33 y.o. male who presents to the emergency department complaining of nausea vomiting and generalized shaking.  History of alcohol abuse.  Drinks over a pint of liquor 7 days a week that has consistently for the past 4 to 5 years.  No history of withdrawal seizures.  Was seen here last night for an episode of dizziness diagnosed with vertigo after workup including head CT and blood work and sent home with scopolamine and Phenergan.  Patient states last drink was 16 hours prior to arrival today.  Has had numerous episodes of nonbloody emesis.  No reported seizure activity.  Here requesting treatment.  He does take clonazepam prescribed him twice daily, has not had today's dose.  Onset: Last night  Duration: Ongoing  Exacerbating / Alleviating factors: None  Associated symptoms: None      Nurses Notes reviewed and agree, including vitals, allergies, social history and prior medical history.     Review of Systems   Constitutional: Negative.    HENT: Negative.     Eyes: Negative.    Respiratory: Negative.     Cardiovascular: Negative.    Gastrointestinal:  Positive for nausea and vomiting.   Genitourinary: Negative.    Musculoskeletal: Negative.    Skin: Negative.    Allergic/Immunologic: Negative.    Neurological:         Generalized shaking   Psychiatric/Behavioral: Negative.     All other systems reviewed and are negative.      Past Medical History:   Diagnosis Date    Anxiety     Colitis     Depression     Erectile dysfunction     History of alcohol use disorder     Hypertension        Allergies:    Patient has no known allergies.      Past Surgical History:   Procedure Laterality Date    ENDOSCOPY N/A 01/22/2023    Procedure: ESOPHAGOGASTRODUODENOSCOPY WITH BIOPSY;  Surgeon: Az Abbasi MD;  [facility];   "Service: Gastroenterology;  Laterality: N/A;    NOSE SURGERY      fracture    WISDOM TOOTH EXTRACTION           Social History     Socioeconomic History    Marital status: Single   Tobacco Use    Smoking status: Never    Smokeless tobacco: Never   Vaping Use    Vaping Use: Unknown   Substance and Sexual Activity    Alcohol use: Yes     Alcohol/week: 10.0 standard drinks of alcohol     Types: 10 Cans of beer per week    Drug use: No         Family History   Problem Relation Age of Onset    Sleep apnea Mother     ADD / ADHD Mother     Anemia Mother     Diabetes Mother     Hypertension Mother     Mental illness Mother     Obesity Mother     Diabetes Father     Hypertension Father     Hyperlipidemia Father     Arthritis Maternal Grandmother     Heart attack Paternal Grandmother        Objective  Physical Exam:  BP (!) 142/103   Pulse 74   Temp 98 °F (36.7 °C)   Resp 16   Ht 185.4 cm (73\")   Wt 83.9 kg (185 lb)   SpO2 95%   BMI 24.41 kg/m²      Physical Exam  Vitals and nursing note reviewed.   Constitutional:       General: He is not in acute distress.     Appearance: Normal appearance. He is normal weight. He is not ill-appearing, toxic-appearing or diaphoretic.   HENT:      Head: Normocephalic and atraumatic.      Nose: Nose normal.   Eyes:      Extraocular Movements: Extraocular movements intact.      Pupils: Pupils are equal, round, and reactive to light.   Cardiovascular:      Rate and Rhythm: Regular rhythm. Tachycardia present.      Heart sounds: Normal heart sounds.   Pulmonary:      Effort: Pulmonary effort is normal.   Abdominal:      General: Abdomen is flat.      Palpations: Abdomen is soft.      Tenderness: There is no abdominal tenderness. There is no guarding or rebound.   Musculoskeletal:         General: Normal range of motion.      Cervical back: Normal range of motion.   Skin:     General: Skin is warm and dry.   Neurological:      General: No focal deficit present.      Mental Status: He is " alert. Mental status is at baseline.   Psychiatric:         Mood and Affect: Mood normal.         Behavior: Behavior normal.           Procedures    ED Course:    ED Course as of 02/17/24 1332   Sat Feb 17, 2024   0907 EKG interpreted by me, normal sinus rhythm with no concerning ST changes noted, rate of 91 [JE]   1036 Total Bilirubin: 1.0 [TM]   1036 ALT (SGPT)(!): 200 [TM]   1036 AST (SGOT)(!): 202 [TM]   1115 Benzodiazepine Screen, Urine(!): Positive [TM]   1115 Bacteria, UA(!): Trace [TM]   1115 Fentanyl, Urine: Negative [TM]   1115 Lipase(!): 238 [TM]      ED Course User Index  [JE] Alon Mark MD  [TM] Ezequiel Johnson PA-C       Lab Results (last 24 hours)       Procedure Component Value Units Date/Time    CBC & Differential [[id number]]  (Abnormal) Collected: 02/16/24 1911    Specimen: Blood Updated: 02/16/24 1921    Narrative:      The following orders were created for panel order CBC & Differential.  Procedure                               Abnormality         Status                     ---------                               -----------         ------                     CBC Auto Differential[[id number]]        Abnormal            Final result                 Please view results for these tests on the individual orders.    Comprehensive Metabolic Panel [[id number]]  (Abnormal) Collected: 02/16/24 1911    Specimen: Blood Updated: 02/16/24 1947     Glucose 88 mg/dL      BUN 11 mg/dL      Creatinine 0.94 mg/dL      Sodium 134 mmol/L      Potassium 4.5 mmol/L      Chloride 92 mmol/L      CO2 23.1 mmol/L      Calcium 9.3 mg/dL      Total Protein 8.5 g/dL      Albumin 5.0 g/dL      ALT (SGPT) 243 U/L      AST (SGOT) 205 U/L      Alkaline Phosphatase 154 U/L      Total Bilirubin 0.8 mg/dL      Globulin 3.5 gm/dL      A/G Ratio 1.4 g/dL      BUN/Creatinine Ratio 11.7     Anion Gap 18.9 mmol/L      eGFR 111.1 mL/min/1.73     Narrative:      GFR Normal >60  Chronic Kidney Disease <60  Kidney Failure  <15      Single High Sensitivity Troponin T [[id number]]  (Normal) Collected: 02/16/24 1911    Specimen: Blood Updated: 02/16/24 1941     HS Troponin T <6 ng/L     Narrative:      High Sensitive Troponin T Reference Range:  <14.0 ng/L- Negative Female for AMI  <22.0 ng/L- Negative Male for AMI  >=14 - Abnormal Female indicating possible myocardial injury.  >=22 - Abnormal Male indicating possible myocardial injury.   Clinicians would have to utilize clinical acumen, EKG, Troponin, and serial changes to determine if it is an Acute Myocardial Infarction or myocardial injury due to an underlying chronic condition.         Magnesium [[id number]]  (Normal) Collected: 02/16/24 1911    Specimen: Blood Updated: 02/16/24 1947     Magnesium 1.9 mg/dL     CBC Auto Differential [[id number]]  (Abnormal) Collected: 02/16/24 1911    Specimen: Blood Updated: 02/16/24 1921     WBC 4.45 10*3/mm3      RBC 5.04 10*6/mm3      Hemoglobin 16.8 g/dL      Hematocrit 47.3 %      MCV 93.8 fL      MCH 33.3 pg      MCHC 35.5 g/dL      RDW 12.3 %      RDW-SD 42.7 fl      MPV 9.3 fL      Platelets 186 10*3/mm3      Neutrophil % 72.8 %      Lymphocyte % 22.0 %      Monocyte % 4.3 %      Eosinophil % 0.0 %      Basophil % 0.7 %      Immature Grans % 0.2 %      Neutrophils, Absolute 3.24 10*3/mm3      Lymphocytes, Absolute 0.98 10*3/mm3      Monocytes, Absolute 0.19 10*3/mm3      Eosinophils, Absolute 0.00 10*3/mm3      Basophils, Absolute 0.03 10*3/mm3      Immature Grans, Absolute 0.01 10*3/mm3      nRBC 0.0 /100 WBC     Ethanol [[id number]]  (Abnormal) Collected: 02/16/24 1911    Specimen: Blood Updated: 02/16/24 1947     Ethanol 342 mg/dL      Ethanol % 0.342 %     Narrative:      This result is for medical use only and should not be used for forensic purposes.    CBC & Differential [[id number]]  (Abnormal) Collected: 02/17/24 0951    Specimen: Blood Updated: 02/17/24 1010    Narrative:      The following orders were created for panel order CBC &  Differential.  Procedure                               Abnormality         Status                     ---------                               -----------         ------                     CBC Auto Differential[[id number]]        Abnormal            Final result                 Please view results for these tests on the individual orders.    Comprehensive Metabolic Panel [[id number]]  (Abnormal) Collected: 02/17/24 0951    Specimen: Blood Updated: 02/17/24 1011     Glucose 105 mg/dL      BUN 13 mg/dL      Creatinine 0.87 mg/dL      Sodium 136 mmol/L      Potassium 4.0 mmol/L      Comment: Slight hemolysis detected by analyzer. Result may be falsely elevated.        Chloride 96 mmol/L      CO2 25.3 mmol/L      Calcium 9.2 mg/dL      Total Protein 7.2 g/dL      Albumin 4.4 g/dL      ALT (SGPT) 200 U/L      AST (SGOT) 202 U/L      Alkaline Phosphatase 133 U/L      Total Bilirubin 1.0 mg/dL      Globulin 2.8 gm/dL      A/G Ratio 1.6 g/dL      BUN/Creatinine Ratio 14.9     Anion Gap 14.7 mmol/L      eGFR 118.3 mL/min/1.73     Narrative:      GFR Normal >60  Chronic Kidney Disease <60  Kidney Failure <15      Lipase [[id number]]  (Abnormal) Collected: 02/17/24 0951    Specimen: Blood Updated: 02/17/24 1011     Lipase 238 U/L     Magnesium [[id number]]  (Normal) Collected: 02/17/24 0951    Specimen: Blood Updated: 02/17/24 1011     Magnesium 1.6 mg/dL     Ethanol [[id number]] Collected: 02/17/24 0951    Specimen: Blood Updated: 02/17/24 1011     Ethanol <10 mg/dL      Ethanol % <0.010 %     Narrative:      This result is for medical use only and should not be used for forensic purposes.    CBC Auto Differential [[id number]]  (Abnormal) Collected: 02/17/24 0951    Specimen: Blood Updated: 02/17/24 1010     WBC 4.44 10*3/mm3      RBC 4.30 10*6/mm3      Hemoglobin 14.3 g/dL      Hematocrit 40.1 %      MCV 93.3 fL      MCH 33.3 pg      MCHC 35.7 g/dL      RDW 12.5 %      RDW-SD 43.3 fl      MPV 9.3 fL      Platelets 146 10*3/mm3       Neutrophil % 74.5 %      Lymphocyte % 15.5 %      Monocyte % 8.6 %      Eosinophil % 0.5 %      Basophil % 0.7 %      Immature Grans % 0.2 %      Neutrophils, Absolute 3.31 10*3/mm3      Lymphocytes, Absolute 0.69 10*3/mm3      Monocytes, Absolute 0.38 10*3/mm3      Eosinophils, Absolute 0.02 10*3/mm3      Basophils, Absolute 0.03 10*3/mm3      Immature Grans, Absolute 0.01 10*3/mm3      nRBC 0.0 /100 WBC     Urinalysis With Microscopic If Indicated (No Culture) - Urine, Clean Catch [[id number]]  (Abnormal) Collected: 02/17/24 1044    Specimen: Urine, Clean Catch Updated: 02/17/24 1053     Color, UA Dark Yellow     Appearance, UA Clear     pH, UA 5.5     Specific Gravity, UA 1.022     Glucose, UA Negative     Ketones, UA Trace     Bilirubin, UA Negative     Blood, UA Negative     Protein, UA Trace     Leuk Esterase, UA Trace     Nitrite, UA Negative     Urobilinogen, UA 1.0 E.U./dL    Urine Drug Screen - Urine, Clean Catch [[id number]]  (Abnormal) Collected: 02/17/24 1044    Specimen: Urine, Clean Catch Updated: 02/17/24 1103     THC, Screen, Urine Negative     Phencyclidine (PCP), Urine Negative     Cocaine Screen, Urine Negative     Methamphetamine, Ur Negative     Opiate Screen Negative     Amphetamine Screen, Urine Negative     Benzodiazepine Screen, Urine Positive     Tricyclic Antidepressants Screen Negative     Methadone Screen, Urine Negative     Barbiturates Screen, Urine Negative     Oxycodone Screen, Urine Negative     Buprenorphine, Screen, Urine Negative    Narrative:      Cutoff For Drugs Screened:    Amphetamines               500 ng/ml  Barbiturates               200 ng/ml  Benzodiazepines            150 ng/ml  Cocaine                    150 ng/ml  Methadone                  200 ng/ml  Opiates                    100 ng/ml  Phencyclidine               25 ng/ml  THC                         50 ng/ml  Methamphetamine            500 ng/ml  Tricyclic Antidepressants  300 ng/ml  Oxycodone                   100 ng/ml  Buprenorphine               10 ng/ml    The normal value for all drugs tested is negative. This report includes unconfirmed screening results, with the cutoff values listed, to be used for medical treatment purposes only.  Unconfirmed results must not be used for non-medical purposes such as employment or legal testing.  Clinical consideration should be applied to any drug of abuse test, particularly when unconfirmed results are used.      Fentanyl, Urine - Urine, Clean Catch [[id number]]  (Normal) Collected: 02/17/24 1044    Specimen: Urine, Clean Catch Updated: 02/17/24 1112     Fentanyl, Urine Negative    Narrative:      Negative Threshold:      Fentanyl 5 ng/mL     The normal value for the drug tested is negative. This report includes final unconfirmed screening results to be used for medical treatment purposes only. Unconfirmed results must not be used for non-medical purposes such as employment or legal testing. Clinical consideration should be applied to any drug of abuse test, particularly when unconfirmed results are used.           Urinalysis, Microscopic Only - Urine, Clean Catch [[id number]]  (Abnormal) Collected: 02/17/24 1044    Specimen: Urine, Clean Catch Updated: 02/17/24 1103     RBC, UA None Seen /HPF      WBC, UA 0-2 /HPF      Bacteria, UA Trace /HPF      Squamous Epithelial Cells, UA 0-2 /HPF      Hyaline Casts, UA None Seen /LPF      Methodology Manual Light Microscopy             XR Chest 1 View    Result Date: 2/17/2024  PORTABLE CHEST  2/17/2024 7:34 AM  HISTORY: Chest pain protocol  COMPARISON:   None  FINDINGS: The cardiac silhouette is normal in size. The mediastinal and hilar contours are unremarkable.  The lungs are clear. There is no pneumothorax. The visualized osseous structures demonstrate no acute abnormalities.      Impression: No acute cardiopulmonary process.     This report was signed and finalized on 2/17/2024 9:45 AM by Kumar Dee MD.      CT Head Without  Contrast    Result Date: 2/16/2024  FINAL REPORT TECHNIQUE: null CLINICAL HISTORY: vertigo sx, headaches and dizziness COMPARISON: null FINDINGS: CT Head WO Contrast COMPARISON: None FINDINGS: No acute intracranial hemorrhage. No evidence of acute infarction. No mass-effect or midline shift. No hydrocephalus. Visualized paranasal sinuses are clear. The mastoid air cells are clear. The visible orbits are normal. No acute fracture. Unremarkable soft tissues.     Impression: IMPRESSION: No acute intracranial findings. Authenticated and Electronically Signed by Pantera Bower MD on 02/16/2024 07:54:12 PM                           Medical Decision Making  Amount and/or Complexity of Data Reviewed  Labs: ordered. Decision-making details documented in ED Course.  ECG/medicine tests: ordered.    Risk  Prescription drug management.              Tr Brito is a 33 y.o. male who presents to the emergency department for evaluation of alcohol withdrawal    Differential diagnosis includes withdrawal, electrolyte abnormality, dehydration, DTs among other etiologies.    CBC, CMP, EtOH, urinalysis, UDS ordered for further evaluation of the patient's presentation.    Chart review if available included outside testing, previous visits, prior labs, prior imaging, available notes from prior evaluations or visits with specialists, medication list, allergies, past medical history, past surgical history when applicable.    Patient was treated with   Medications   LORazepam (ATIVAN) injection 1 mg (1 mg Intravenous Given 2/17/24 1018)   promethazine (PHENERGAN) tablet 12.5 mg (12.5 mg Oral Given 2/17/24 1015)   sodium chloride 0.9 % bolus 1,000 mL ( Intravenous Currently Infusing 2/17/24 1141)   chlordiazePOXIDE (LIBRIUM) capsule 50 mg (50 mg Oral Given 2/17/24 1018)   ondansetron (ZOFRAN) injection 4 mg (4 mg Intravenous Given 2/17/24 1100)       She was accepted by Dr. Jimenez at [facility] for inpatient alcohol  withdrawal/detox      Plan for disposition is transfer to behavioral health.  Patient/family comfortable with and understanding of the plan.      Final diagnoses:   Alcohol withdrawal syndrome without complication          Ezequiel Johnson PA-C  02/17/24 8865

## 2024-02-17 NOTE — CONSULTS
"Tr Brito  1990    Race/Ethnicity: White or   Martial Status: Single  Guardian Name/Contact/etc: self  Pt Lives With:  girlfriend   Occupation: full time job - [employer]   Appearance: clean and casually dressed, appropriate     Time Called for Assessment: 11:00  Assessment Start and End: 11:00-11:30      DATA:   Clinician received a call from [facility] staff for a behavioral health consult.  The patient is agreeable to speak with the behavioral health team.  Met with patient at bedside. Patient is not under 1:1 security monitoring.  The attending treatment team is FINA Lam and Ezequiel MAHARAJ, Provider.  Patient presents today with chief compliant of substance abuse. Per patient, he has been drinking daily for the past 5 years since his mother passed away. Patient reports he is unsure of how much he drinks but he usually drinks bourbon and it is at least \"half a bottle\" per day. Patient's CIWA is 18. Patient is agreeable to inpatient detox.  Clinician completed assessment with patient and observations are documented as follows.    ASSESSMENT:    Clinician consulted with patient for mental status exam and assessment.  Clinical descriptors are documented as follows.  Clinician completed CSSRS with patient for suicide risk assessment.  The results of patient’s CSSRS documented as follows.    Presenting Problems:  alcohol withdrawal   Current Stressors: chemical dependency/abuse     Established Therapy, Medication Management or Other Mental Health Services: none   Current Psychiatric Medications:   cetirizine (zyrTEC) 10 MG tablet    clonazePAM (KlonoPIN) 1 MG tablet  diazePAM (Valium) 5 MG tablet  omeprazole (priLOSEC) 40 MG capsule  ondansetron (Zofran) 8 MG tablet  ondansetron ODT (ZOFRAN-ODT) 8 MG disintegrating tablet  promethazine (PHENERGAN) 25 MG tablet  Scopolamine 1 MG/3DAYS patch    sildenafil (Viagra) 100 MG tablet       Mental Status Exam:  Behavior: " Appropriate  Psychomotor Movement: Restless  Attention and Cooperation: Normal and Cooperative  Mood: anxious and Affect: Appropriate  Orientation: alert and oriented to person, place, and time   Thought Process: linear, logical, and goal directed  Thought Content: normal  Delusions:  none    Hallucinations: None   Concentration: Normal  Suicidal Ideation: Absent  Homicidal Ideation: Absent  Hopelessness: no  Speech: Normal  Eye Contact: Good  Insight: good  Judgement: good    Depression: 3   Anxiety: 10  Sleep: Good   Appetite: Good       Hx of Psychiatric or Detox Hospitalizations:  No  Most recent inpatient admission: n/a    Suicidal Ideation Assessment:    COLUMBIA-SUICIDE SEVERITY RATING SCALE  Psychiatric Inpatient Setting - Discharge Screener    Ask questions that are bold and underlined Discharge   Ask Questions 1 and 2 YES NO   Wish to be Dead:   Person endorses thoughts about a wish to be dead or not alive anymore, or wish to fall asleep and not wake up.  While you were here in the hospital, have you wished you were dead or wished you could go to sleep and not wake up?  x   Suicidal Thoughts:   General non-specific thoughts of wanting to end one's life/die by suicide, “I've thought about killing myself” without general thoughts of ways to kill oneself/associated methods, intent, or plan.   While you were here in the hospital, have you actually had thoughts about killing yourself?   x   If YES to 2, ask questions 3, 4, 5, and 6.  If NO to 2, go directly to question 6   3) Suicidal Thoughts with Method (without Specific Plan or Intent to Act):   Person endorses thoughts of suicide and has thought of a least one method during the assessment period. This is different than a specific plan with time, place or method details worked out. “I thought about taking an overdose but I never made a specific plan as to when where or how I would actually do it….and I would never go through with it.”   Have you been  thinking about how you might kill yourself?      4) Suicidal Intent (without Specific Plan):   Active suicidal thoughts of killing oneself and patient reports having some intent to act on such thoughts, as opposed to “I have the thoughts but I definitely will not do anything about them.”   Have you had these thoughts and had some intention of acting on them or do you have some intention of acting on them after you leave the hospital?      5) Suicide Intent with Specific Plan:   Thoughts of killing oneself with details of plan fully or partially worked out and person has some intent to carry it out.   Have you started to work out or worked out the details of how to kill yourself either for while you were here in the hospital or for after you leave the hospital? Do you intend to carry out this plan?        6) Suicide Behavior    While you were here in the hospital, have you done anything, started to do anything, or prepared to do anything to end your life?    Examples: Took pills, cut yourself, tried to hang yourself, took out pills but didn't swallow any because you changed your mind or someone took them from you, collected pills, secured a means of obtaining a gun, gave away valuables, wrote a will or suicide note, etc.  x     Suicidal: Absent (If present, describe frequency of thoughts, patient's perception of impulse control, plan or behavior details, etc)  Previous Attempts:  none  Most Recent Attempt: n/a    Psychosocial History    Highest Level of Education:  unknown    Family Hx of Mental Health/Substance Abuse: No  Patient Trauma/Abuse History: Further details: did not disclose     Does this require reporting: N/A  Patient Identified Support System (List family members, loved ones, guardians, friends, etc): girlfriend    Legal History / History of Violence: None known   Experience with Interpersonal Violence: No  History of Inappropriate Sexual Behavior: No  Current Medical Conditions or Biomedical  Complications: No (If yes, explain/list)    Social Determinants of Health  Housing Instability and/or Utility Needs: No  Food Insecurity: No  Transportation Needs: No    Substance Use History  Active Use: Yes, describe: alcohol   If yes, what is active: Alcohol  History of Use: Patient reports he has drank daily for five years since his mother passed away.   Does the patient have history or current MAT/MOUD: No  Withdrawal Symptoms: Yes, describe: see CIWA  History of DT's: Yes, describe: patient presented to ED today due to DT's.   History of Seizures: No  Recovery Environment: [facility] Withdrawal Assessment of Alcohol Scale (CIWA)  Pulse or heart rate, taken for one minute: 74  Blood pressure: 142/103    NAUSEA AND VOMITING--Ask “Do you fell sick to your stomach? Have you vomited?” Observatoin.  0 no nausea and no vomiting  1 mild nausea with no vomiting  2  3  4 intermittent nausea with dry heaves  5  6  7 constant nausea, frequent dry heaves and vomiting    TREMOR--Arms extended and fingers spread apart.  Observation  0 no tremor  1 not visible, but can be felt fingertip to fingertip  2  3  4 moderate, with patient's arms extended  5  6  7 severe, even with arms not extended    PAROXYSMAL SWEATS--Observation  0 no sweat visible  1 barely perceptible sweating, palms moist  2  3  4 beads of sweat obvious on forehead  5  6  7 drenching sweats    ANXIETY--Ask “Do you feel nervous?” Observation.  0 no anxiety, at ease  1 mild anxious  2  3  4 moderately anxious, or guarded, so anxiety is inferred  5  6  7 equivalent to acute panic states as seen in severe delirium or acute schizophrenic reactions    TACTILE DISTURBANCES--Ask “Have you any itching, pins and needles sensations, any burning, any numbness, or do you feel bugs crawling on or under your skin?” Observation.  0 none  1 very mild itching, pins and needles, burning or numbness  2 mild itching, pins and needles, burning or  numbness  3 moderate itching, pins and needles, burning or numbness  4 moderately severe hallucinations  5 severe hallucinations  6 extremely severe hallucinations  7 continuous hallucinations  AUDITORY DISTURBANCES--Ask “Are you more aware of sounds around you ? Are they harsh? Do they frighten you?  Are you hearing anything that is disturbing to you?  Are you hearing things you know are not there? Observation.  0 not present  1 very mild harshness or ability to frighten  2 mild harshness or ability to frighten  3 moderate harshness or ability to frighten  4 moderately severe hallucinations  5 severe hallucinations  6 extremely severe hallucinations  7 continuous hallucinations       VISUAL DISTURBANCES--Ask “Does the light appear to be too bright? Is its color different? Does it hurt your eyes?  Are you seeing anything that is disturbing to you? Are you seeing things you know are not there?' Observation  0 not present  1 very mild sensitivity  2 mild sensitivity  3 moderate sensitivity  4 moderately severe hallucinations  5 severe hallucinations  6 extremely severe hallucinations  7 continuous hallucinations    HEADACHE, FULLNESS IN HEAD--Ask “Does your head feel different? Does it feel like there is a band around your head?” Do not rate for dizziness or lightheadedness.  Otherwise, rate severity.  0 not present  1 very mild  2 mild  3 moderate  4 moderately severe  5 severe  6 very severe  7 extremely severe    AGITATION--Observation  0 normal activity  1 somewhat more than normal activity  2   3  4 moderately fidgety and restless  5  6  7 paces back and forth during most of the interview, or constantly thrashes about    ORIENTATION AND CLOUDING OF SENSORIUM--Ask   “What day is this? Where are you? Who am I?  0 oriented and can do serial additions  1 cannot do serial additions or is uncertain about date  2 disoriented for date by no more than 2 calendar days  3 disoriented for date by more than 2 calendar days  4  disoriented for place/or person    Total CIWA-Ar Score: 18   Rater's Initials: KA      PLAN:  At this time, clinician recommends inpatient detox treatment based upon the above assessment.   Clinician collaborated with the treatment team who agree to adopt the recommendations.  Clinician discussed recommendations with patient and/or patient support systems, and patient is agreeable to the plan.  Patient is agreeable for referrals to be sent to facilities and agencies for treatment.    Have the levels of care been discussed with the patient? Yes  Level of care recommendation: inpatient detox  Is patient agreeable to treatment? Yes    Care Coordination Timeline:  12:00: Therapist spoke with Avelina, Lead RN at [facility] to present referral.   12:40: Patient was accepted by Dr. Jimenez.   13:28: [facility] ETA: 15:00. Patient and treatment team were updated.         Haydee Lim, FLETCHERW, MSW

## 2024-02-17 NOTE — CONSULTS
Therapist was asked by provider Levi Keane PA-C to provide substance use resources to patient.     20:47 Therapist provided patient with Resources for outpatient and inpatient substance abuse facilities , and and discussed the availability of emergency behavioral health services 24/7 through the [facility].      Derrick Jackson MA [facility]  02/16/2024

## 2024-02-17 NOTE — DISCHARGE INSTRUCTIONS
Follow-up closely with your primary care and the resources provided to you by behavioral health.  Your liver enzymes were elevated, if you continue drinking like you are they will continue to rise and could lead to cirrhosis or alcoholic hepatitis.  Recommend he follow-up closely with your primary care.  I have sent antinausea medicines and scopolamine patches to your pharmacy take as directed and as needed only.  Additionally recommend plenty of oral fluids.

## 2024-02-18 ENCOUNTER — HOSPITAL ENCOUNTER (INPATIENT)
Facility: HOSPITAL | Age: 34
LOS: 2 days | Discharge: HOME | DRG: 897 | End: 2024-02-20
Attending: INTERNAL MEDICINE | Admitting: INTERNAL MEDICINE
Payer: COMMERCIAL

## 2024-02-18 ENCOUNTER — APPOINTMENT (OUTPATIENT)
Dept: ULTRASOUND IMAGING | Facility: HOSPITAL | Age: 34
DRG: 897 | End: 2024-02-18
Payer: COMMERCIAL

## 2024-02-18 VITALS
RESPIRATION RATE: 18 BRPM | HEART RATE: 124 BPM | TEMPERATURE: 97.7 F | OXYGEN SATURATION: 98 % | WEIGHT: 190.6 LBS | DIASTOLIC BLOOD PRESSURE: 79 MMHG | HEIGHT: 72 IN | BODY MASS INDEX: 25.81 KG/M2 | SYSTOLIC BLOOD PRESSURE: 138 MMHG

## 2024-02-18 DIAGNOSIS — F10.931 ALCOHOL WITHDRAWAL SYNDROME, WITH DELIRIUM: Primary | ICD-10-CM

## 2024-02-18 PROBLEM — F10.939 ALCOHOL WITHDRAWAL: Status: ACTIVE | Noted: 2024-02-18

## 2024-02-18 LAB
HAV IGM SERPL QL IA: NORMAL
HBV CORE IGM SERPL QL IA: NORMAL
HBV SURFACE AG SERPL QL IA: NORMAL
HCV AB SER DONR QL: NORMAL
QT INTERVAL: 388 MS
QTC INTERVAL: 406 MS

## 2024-02-18 PROCEDURE — 25810000003 SODIUM CHLORIDE 0.9 % SOLUTION: Performed by: INTERNAL MEDICINE

## 2024-02-18 PROCEDURE — 25010000002 HALOPERIDOL LACTATE PER 5 MG: Performed by: INTERNAL MEDICINE

## 2024-02-18 PROCEDURE — 25010000002 DIPHENHYDRAMINE PER 50 MG: Performed by: PSYCHIATRY & NEUROLOGY

## 2024-02-18 PROCEDURE — 99222 1ST HOSP IP/OBS MODERATE 55: CPT | Performed by: PSYCHIATRY & NEUROLOGY

## 2024-02-18 PROCEDURE — 25010000002 HALOPERIDOL LACTATE PER 5 MG: Performed by: PSYCHIATRY & NEUROLOGY

## 2024-02-18 PROCEDURE — 25010000002 HALOPERIDOL LACTATE PER 5 MG

## 2024-02-18 PROCEDURE — 63710000001 PROMETHAZINE PER 25 MG: Performed by: PSYCHIATRY & NEUROLOGY

## 2024-02-18 PROCEDURE — 80074 ACUTE HEPATITIS PANEL: CPT | Performed by: PSYCHIATRY & NEUROLOGY

## 2024-02-18 PROCEDURE — 63710000001 DIPHENHYDRAMINE PER 50 MG: Performed by: PSYCHIATRY & NEUROLOGY

## 2024-02-18 PROCEDURE — 25010000002 ENOXAPARIN PER 10 MG: Performed by: INTERNAL MEDICINE

## 2024-02-18 PROCEDURE — 93005 ELECTROCARDIOGRAM TRACING: CPT | Performed by: INTERNAL MEDICINE

## 2024-02-18 PROCEDURE — 99223 1ST HOSP IP/OBS HIGH 75: CPT | Performed by: PSYCHIATRY & NEUROLOGY

## 2024-02-18 PROCEDURE — 25010000002 PHENOBARBITAL PER 120 MG: Performed by: INTERNAL MEDICINE

## 2024-02-18 PROCEDURE — 93010 ELECTROCARDIOGRAM REPORT: CPT | Performed by: INTERNAL MEDICINE

## 2024-02-18 PROCEDURE — 25010000002 DIAZEPAM PER 5 MG: Performed by: INTERNAL MEDICINE

## 2024-02-18 PROCEDURE — 99223 1ST HOSP IP/OBS HIGH 75: CPT | Performed by: INTERNAL MEDICINE

## 2024-02-18 PROCEDURE — 25010000002 DIPHENHYDRAMINE PER 50 MG

## 2024-02-18 PROCEDURE — 25010000002 THIAMINE HCL 200 MG/2ML SOLUTION: Performed by: INTERNAL MEDICINE

## 2024-02-18 RX ORDER — HALOPERIDOL 5 MG/ML
5 INJECTION INTRAMUSCULAR ONCE
Status: COMPLETED | OUTPATIENT
Start: 2024-02-18 | End: 2024-02-18

## 2024-02-18 RX ORDER — HALOPERIDOL 5 MG/1
5 TABLET ORAL EVERY 6 HOURS PRN
Status: DISCONTINUED | OUTPATIENT
Start: 2024-02-18 | End: 2024-02-18 | Stop reason: HOSPADM

## 2024-02-18 RX ORDER — DIAZEPAM 5 MG/ML
5 INJECTION, SOLUTION INTRAMUSCULAR; INTRAVENOUS EVERY 4 HOURS PRN
Status: DISCONTINUED | OUTPATIENT
Start: 2024-02-18 | End: 2024-02-20 | Stop reason: HOSPADM

## 2024-02-18 RX ORDER — SCOLOPAMINE TRANSDERMAL SYSTEM 1 MG/1
1 PATCH, EXTENDED RELEASE TRANSDERMAL
COMMUNITY
End: 2024-02-20 | Stop reason: HOSPADM

## 2024-02-18 RX ORDER — LORAZEPAM 2 MG/1
4 TABLET ORAL
Status: DISCONTINUED | OUTPATIENT
Start: 2024-02-18 | End: 2024-02-20 | Stop reason: HOSPADM

## 2024-02-18 RX ORDER — PHENOBARBITAL 32.4 MG/1
32.4 TABLET ORAL ONCE
Status: DISCONTINUED | OUTPATIENT
Start: 2024-02-21 | End: 2024-02-20 | Stop reason: HOSPADM

## 2024-02-18 RX ORDER — ONDANSETRON 2 MG/ML
4 INJECTION INTRAMUSCULAR; INTRAVENOUS EVERY 6 HOURS PRN
Status: DISCONTINUED | OUTPATIENT
Start: 2024-02-18 | End: 2024-02-20 | Stop reason: HOSPADM

## 2024-02-18 RX ORDER — LORAZEPAM 1 MG/1
1 TABLET ORAL EVERY 4 HOURS PRN
Status: DISCONTINUED | OUTPATIENT
Start: 2024-02-20 | End: 2024-02-18

## 2024-02-18 RX ORDER — BISACODYL 10 MG
10 SUPPOSITORY, RECTAL RECTAL DAILY PRN
Status: DISCONTINUED | OUTPATIENT
Start: 2024-02-18 | End: 2024-02-20 | Stop reason: HOSPADM

## 2024-02-18 RX ORDER — THIAMINE HYDROCHLORIDE 100 MG/ML
200 INJECTION, SOLUTION INTRAMUSCULAR; INTRAVENOUS EVERY 8 HOURS SCHEDULED
Qty: 30 ML | Refills: 0 | Status: DISCONTINUED | OUTPATIENT
Start: 2024-02-18 | End: 2024-02-20 | Stop reason: HOSPADM

## 2024-02-18 RX ORDER — ONDANSETRON 4 MG/1
8 TABLET, ORALLY DISINTEGRATING ORAL EVERY 8 HOURS PRN
Status: CANCELLED | OUTPATIENT
Start: 2024-02-18

## 2024-02-18 RX ORDER — DIAZEPAM 5 MG/ML
5 INJECTION, SOLUTION INTRAMUSCULAR; INTRAVENOUS EVERY 4 HOURS PRN
Status: DISCONTINUED | OUTPATIENT
Start: 2024-02-23 | End: 2024-02-20 | Stop reason: HOSPADM

## 2024-02-18 RX ORDER — LORAZEPAM 2 MG/1
2 TABLET ORAL
Status: DISCONTINUED | OUTPATIENT
Start: 2024-02-18 | End: 2024-02-20 | Stop reason: HOSPADM

## 2024-02-18 RX ORDER — ONDANSETRON 8 MG/1
8 TABLET, ORALLY DISINTEGRATING ORAL EVERY 8 HOURS PRN
COMMUNITY

## 2024-02-18 RX ORDER — POLYETHYLENE GLYCOL 3350 17 G/17G
17 POWDER, FOR SOLUTION ORAL DAILY PRN
Status: DISCONTINUED | OUTPATIENT
Start: 2024-02-18 | End: 2024-02-20 | Stop reason: HOSPADM

## 2024-02-18 RX ORDER — METHION/INOS/CHOL BT/B COM/LIV 110MG-86MG
100 CAPSULE ORAL DAILY
Status: DISCONTINUED | OUTPATIENT
Start: 2024-02-25 | End: 2024-02-20 | Stop reason: HOSPADM

## 2024-02-18 RX ORDER — SODIUM CHLORIDE 9 MG/ML
40 INJECTION, SOLUTION INTRAVENOUS AS NEEDED
Status: DISCONTINUED | OUTPATIENT
Start: 2024-02-18 | End: 2024-02-20 | Stop reason: HOSPADM

## 2024-02-18 RX ORDER — CHLORDIAZEPOXIDE HYDROCHLORIDE 25 MG/1
50 CAPSULE, GELATIN COATED ORAL EVERY 8 HOURS SCHEDULED
Status: DISCONTINUED | OUTPATIENT
Start: 2024-02-18 | End: 2024-02-20

## 2024-02-18 RX ORDER — LORAZEPAM 2 MG/1
2 TABLET ORAL
Status: DISCONTINUED | OUTPATIENT
Start: 2024-02-18 | End: 2024-02-18

## 2024-02-18 RX ORDER — PROMETHAZINE HYDROCHLORIDE 12.5 MG/1
12.5 TABLET ORAL EVERY 6 HOURS PRN
Status: CANCELLED | OUTPATIENT
Start: 2024-02-18

## 2024-02-18 RX ORDER — LORAZEPAM 0.5 MG/1
0.5 TABLET ORAL EVERY 4 HOURS PRN
Status: DISCONTINUED | OUTPATIENT
Start: 2024-02-21 | End: 2024-02-18 | Stop reason: HOSPADM

## 2024-02-18 RX ORDER — ENOXAPARIN SODIUM 100 MG/ML
40 INJECTION SUBCUTANEOUS NIGHTLY
Status: DISCONTINUED | OUTPATIENT
Start: 2024-02-18 | End: 2024-02-20 | Stop reason: HOSPADM

## 2024-02-18 RX ORDER — PHENOBARBITAL SODIUM 65 MG/ML
65 INJECTION, SOLUTION INTRAMUSCULAR; INTRAVENOUS ONCE
Status: COMPLETED | OUTPATIENT
Start: 2024-02-19 | End: 2024-02-19

## 2024-02-18 RX ORDER — LORAZEPAM 1 MG/1
1 TABLET ORAL EVERY 4 HOURS PRN
Status: DISCONTINUED | OUTPATIENT
Start: 2024-02-20 | End: 2024-02-18 | Stop reason: HOSPADM

## 2024-02-18 RX ORDER — DIPHENHYDRAMINE HYDROCHLORIDE 50 MG/ML
50 INJECTION INTRAMUSCULAR; INTRAVENOUS ONCE
Status: COMPLETED | OUTPATIENT
Start: 2024-02-18 | End: 2024-02-18

## 2024-02-18 RX ORDER — HALOPERIDOL 5 MG/ML
5 INJECTION INTRAMUSCULAR ONCE
Qty: 1 ML | Refills: 0 | Status: COMPLETED | OUTPATIENT
Start: 2024-02-18 | End: 2024-02-18

## 2024-02-18 RX ORDER — BISACODYL 5 MG/1
5 TABLET, DELAYED RELEASE ORAL DAILY PRN
Status: DISCONTINUED | OUTPATIENT
Start: 2024-02-18 | End: 2024-02-20 | Stop reason: HOSPADM

## 2024-02-18 RX ORDER — PHENOBARBITAL 32.4 MG/1
32.4 TABLET ORAL ONCE
Status: COMPLETED | OUTPATIENT
Start: 2024-02-20 | End: 2024-02-20

## 2024-02-18 RX ORDER — AMOXICILLIN 250 MG
2 CAPSULE ORAL 2 TIMES DAILY
Status: DISCONTINUED | OUTPATIENT
Start: 2024-02-18 | End: 2024-02-20 | Stop reason: HOSPADM

## 2024-02-18 RX ORDER — CHLORDIAZEPOXIDE HYDROCHLORIDE 25 MG/1
50 CAPSULE, GELATIN COATED ORAL 3 TIMES DAILY
Status: DISCONTINUED | OUTPATIENT
Start: 2024-02-18 | End: 2024-02-18 | Stop reason: HOSPADM

## 2024-02-18 RX ORDER — NITROGLYCERIN 0.4 MG/1
0.4 TABLET SUBLINGUAL
Status: DISCONTINUED | OUTPATIENT
Start: 2024-02-18 | End: 2024-02-20 | Stop reason: HOSPADM

## 2024-02-18 RX ORDER — SODIUM CHLORIDE 0.9 % (FLUSH) 0.9 %
10 SYRINGE (ML) INJECTION EVERY 12 HOURS SCHEDULED
Status: DISCONTINUED | OUTPATIENT
Start: 2024-02-18 | End: 2024-02-20 | Stop reason: HOSPADM

## 2024-02-18 RX ORDER — SODIUM CHLORIDE 0.9 % (FLUSH) 0.9 %
10 SYRINGE (ML) INJECTION AS NEEDED
Status: DISCONTINUED | OUTPATIENT
Start: 2024-02-18 | End: 2024-02-20 | Stop reason: HOSPADM

## 2024-02-18 RX ORDER — CLONAZEPAM 1 MG/1
1 TABLET ORAL 2 TIMES DAILY PRN
COMMUNITY
End: 2024-02-21 | Stop reason: SDUPTHER

## 2024-02-18 RX ORDER — MULTIPLE VITAMINS W/ MINERALS TAB 9MG-400MCG
1 TAB ORAL DAILY
Status: DISCONTINUED | OUTPATIENT
Start: 2024-02-19 | End: 2024-02-20 | Stop reason: HOSPADM

## 2024-02-18 RX ORDER — ACETAMINOPHEN 650 MG/1
650 SUPPOSITORY RECTAL EVERY 4 HOURS PRN
Status: DISCONTINUED | OUTPATIENT
Start: 2024-02-18 | End: 2024-02-20 | Stop reason: HOSPADM

## 2024-02-18 RX ORDER — CHLORDIAZEPOXIDE HYDROCHLORIDE 25 MG/1
25 CAPSULE, GELATIN COATED ORAL 3 TIMES DAILY
Status: DISCONTINUED | OUTPATIENT
Start: 2024-02-19 | End: 2024-02-18 | Stop reason: HOSPADM

## 2024-02-18 RX ORDER — FOLIC ACID 1 MG/1
1 TABLET ORAL DAILY
Status: DISCONTINUED | OUTPATIENT
Start: 2024-02-18 | End: 2024-02-20 | Stop reason: HOSPADM

## 2024-02-18 RX ORDER — LORAZEPAM 2 MG/1
2 TABLET ORAL
Status: DISCONTINUED | OUTPATIENT
Start: 2024-02-18 | End: 2024-02-18 | Stop reason: HOSPADM

## 2024-02-18 RX ORDER — PHENOBARBITAL SODIUM 65 MG/ML
65 INJECTION, SOLUTION INTRAMUSCULAR; INTRAVENOUS ONCE
Status: COMPLETED | OUTPATIENT
Start: 2024-02-20 | End: 2024-02-20

## 2024-02-18 RX ORDER — SCOLOPAMINE TRANSDERMAL SYSTEM 1 MG/1
1 PATCH, EXTENDED RELEASE TRANSDERMAL
Status: CANCELLED | OUTPATIENT
Start: 2024-02-18

## 2024-02-18 RX ORDER — HYDRALAZINE HYDROCHLORIDE 25 MG/1
25 TABLET, FILM COATED ORAL 3 TIMES DAILY PRN
Status: DISCONTINUED | OUTPATIENT
Start: 2024-02-18 | End: 2024-02-18 | Stop reason: HOSPADM

## 2024-02-18 RX ORDER — SILDENAFIL 100 MG/1
100 TABLET, FILM COATED ORAL DAILY PRN
COMMUNITY
End: 2024-02-20 | Stop reason: HOSPADM

## 2024-02-18 RX ORDER — CHLORDIAZEPOXIDE HYDROCHLORIDE 25 MG/1
50 CAPSULE, GELATIN COATED ORAL ONCE
Status: COMPLETED | OUTPATIENT
Start: 2024-02-18 | End: 2024-02-18

## 2024-02-18 RX ORDER — PROMETHAZINE HYDROCHLORIDE 25 MG/1
12.5 TABLET ORAL EVERY 6 HOURS PRN
COMMUNITY
End: 2024-02-20 | Stop reason: HOSPADM

## 2024-02-18 RX ORDER — DIPHENHYDRAMINE HCL 50 MG
50 CAPSULE ORAL EVERY 6 HOURS PRN
Status: DISCONTINUED | OUTPATIENT
Start: 2024-02-18 | End: 2024-02-18 | Stop reason: HOSPADM

## 2024-02-18 RX ORDER — LORAZEPAM 1 MG/1
1 TABLET ORAL
Status: DISCONTINUED | OUTPATIENT
Start: 2024-02-18 | End: 2024-02-20 | Stop reason: HOSPADM

## 2024-02-18 RX ORDER — LORAZEPAM 0.5 MG/1
0.5 TABLET ORAL EVERY 4 HOURS PRN
Status: DISCONTINUED | OUTPATIENT
Start: 2024-02-21 | End: 2024-02-18

## 2024-02-18 RX ORDER — ACETAMINOPHEN 325 MG/1
650 TABLET ORAL EVERY 4 HOURS PRN
Status: DISCONTINUED | OUTPATIENT
Start: 2024-02-18 | End: 2024-02-20 | Stop reason: HOSPADM

## 2024-02-18 RX ADMIN — LORAZEPAM 2 MG: 2 TABLET ORAL at 10:14

## 2024-02-18 RX ADMIN — ENOXAPARIN SODIUM 40 MG: 40 INJECTION SUBCUTANEOUS at 21:05

## 2024-02-18 RX ADMIN — SODIUM CHLORIDE 0.8 MCG/KG/HR: 9 INJECTION, SOLUTION INTRAVENOUS at 20:01

## 2024-02-18 RX ADMIN — LORAZEPAM 2 MG: 2 TABLET ORAL at 15:13

## 2024-02-18 RX ADMIN — LORAZEPAM 2 MG: 2 TABLET ORAL at 21:05

## 2024-02-18 RX ADMIN — HYDROXYZINE HYDROCHLORIDE 50 MG: 50 TABLET ORAL at 07:01

## 2024-02-18 RX ADMIN — LORAZEPAM 2 MG: 2 TABLET ORAL at 12:14

## 2024-02-18 RX ADMIN — LORAZEPAM 2 MG: 2 TABLET ORAL at 13:17

## 2024-02-18 RX ADMIN — CHLORDIAZEPOXIDE HYDROCHLORIDE 50 MG: 25 CAPSULE ORAL at 15:04

## 2024-02-18 RX ADMIN — LORAZEPAM 2 MG: 2 TABLET ORAL at 14:05

## 2024-02-18 RX ADMIN — DIPHENHYDRAMINE HYDROCHLORIDE 50 MG: 50 INJECTION, SOLUTION INTRAMUSCULAR; INTRAVENOUS at 16:00

## 2024-02-18 RX ADMIN — CHLORDIAZEPOXIDE HYDROCHLORIDE 50 MG: 25 CAPSULE ORAL at 15:59

## 2024-02-18 RX ADMIN — THIAMINE HYDROCHLORIDE 200 MG: 100 INJECTION, SOLUTION INTRAMUSCULAR; INTRAVENOUS at 21:07

## 2024-02-18 RX ADMIN — Medication 1 TABLET: at 08:12

## 2024-02-18 RX ADMIN — HALOPERIDOL 5 MG: 5 TABLET ORAL at 11:13

## 2024-02-18 RX ADMIN — PHENOBARBITAL SODIUM 310.7 MG: 65 INJECTION INTRAMUSCULAR; INTRAVENOUS at 21:53

## 2024-02-18 RX ADMIN — PROMETHAZINE HYDROCHLORIDE 12.5 MG: 25 TABLET ORAL at 07:01

## 2024-02-18 RX ADMIN — LORAZEPAM 2 MG: 2 TABLET ORAL at 10:52

## 2024-02-18 RX ADMIN — HALOPERIDOL LACTATE 5 MG: 5 INJECTION, SOLUTION INTRAMUSCULAR at 21:30

## 2024-02-18 RX ADMIN — LORAZEPAM 2 MG: 2 TABLET ORAL at 07:00

## 2024-02-18 RX ADMIN — DIPHENHYDRAMINE HYDROCHLORIDE 50 MG: 50 INJECTION INTRAMUSCULAR; INTRAVENOUS at 13:17

## 2024-02-18 RX ADMIN — SODIUM CHLORIDE 0.2 MCG/KG/HR: 9 INJECTION, SOLUTION INTRAVENOUS at 19:00

## 2024-02-18 RX ADMIN — DIAZEPAM 5 MG: 5 INJECTION, SOLUTION INTRAMUSCULAR; INTRAVENOUS at 19:46

## 2024-02-18 RX ADMIN — DOCUSATE SODIUM 50 MG AND SENNOSIDES 8.6 MG 2 TABLET: 8.6; 5 TABLET, FILM COATED ORAL at 21:05

## 2024-02-18 RX ADMIN — SCOPOLAMINE 1 PATCH: 1.5 PATCH, EXTENDED RELEASE TRANSDERMAL at 08:13

## 2024-02-18 RX ADMIN — Medication 10 ML: at 21:06

## 2024-02-18 RX ADMIN — LORAZEPAM 2 MG: 2 TABLET ORAL at 00:44

## 2024-02-18 RX ADMIN — HALOPERIDOL LACTATE 5 MG: 5 INJECTION, SOLUTION INTRAMUSCULAR at 16:00

## 2024-02-18 RX ADMIN — CHLORDIAZEPOXIDE HYDROCHLORIDE 50 MG: 25 CAPSULE ORAL at 21:06

## 2024-02-18 RX ADMIN — HYDRALAZINE HYDROCHLORIDE 25 MG: 25 TABLET, FILM COATED ORAL at 10:52

## 2024-02-18 RX ADMIN — Medication 100 MG: at 08:12

## 2024-02-18 RX ADMIN — HALOPERIDOL LACTATE 5 MG: 5 INJECTION, SOLUTION INTRAMUSCULAR at 13:17

## 2024-02-18 RX ADMIN — LORAZEPAM 2 MG: 2 TABLET ORAL at 16:06

## 2024-02-18 RX ADMIN — Medication 2 TABLET: at 08:12

## 2024-02-18 RX ADMIN — DIPHENHYDRAMINE HCL 50 MG: 50 CAPSULE ORAL at 11:13

## 2024-02-18 RX ADMIN — PROMETHAZINE HYDROCHLORIDE 12.5 MG: 25 TABLET ORAL at 00:16

## 2024-02-18 RX ADMIN — CHLORDIAZEPOXIDE HYDROCHLORIDE 50 MG: 25 CAPSULE ORAL at 09:41

## 2024-02-18 NOTE — PLAN OF CARE
"Goal Outcome Evaluation:  Plan of Care Reviewed With: patient  Patient Agreement with Plan of Care: agrees     Progress: declining  Outcome Evaluation: Pt has been disoriented and agitated most of shift. Pt has required several PRN medications for BP and elevated CIWAs. Pt rated anxiety 9/10 and depression 8/10. Pt denies SI/HI/AVH but patient continues to report mice in his room and looking for the cat up and down the hallway. Pt also states \"Im waiting for my dad to bring the helicopter and come pick me up.\" MD made aware. Pt had a fall this shift and now has an order for sitter at bedside for safety. No s/s of distress ntd.                               "

## 2024-02-18 NOTE — H&P
[facility] HOSPITALIST HISTORY AND PHYSICAL      Admit Date: 2/18/2024        Chief complaint Detox    Subjective     Tr Brito is a 33 y.o. male initially admitted to the detox unit on 2/17 for treatment of alcohol and benzodiazepine dependence. He was started on Ativan detox protocol in addition to supplemental Librium. Despite these interventions, he developed worsening withdrawal with worsening confusion and hallucinations requiring more frequent interventions and escalating PRN medications. The hospitalist service was ultimately consulted and upon evaluation, the decision was made to transfer patient to the CCU for closer monitoring.     Upon my evaluation, patient is alert with RN and staff at bedside. His oriented to person only and unable to provide any reliable history. Appears to be experiencing visual hallucinations during my encounter. Appears restless requiring frequent redirection from staff.       History  Past Medical History:   Diagnosis Date    Anxiety     Colitis     Depression     Erectile dysfunction     History of alcohol use disorder     Hypertension      Past Surgical History:   Procedure Laterality Date    ENDOSCOPY N/A 01/22/2023    Procedure: ESOPHAGOGASTRODUODENOSCOPY WITH BIOPSY;  Surgeon: Az Abbasi MD;  Location: [facility] ENDOSCOPY;  Service: Gastroenterology;  Laterality: N/A;    NOSE SURGERY      fracture    WISDOM TOOTH EXTRACTION       Family History   Problem Relation Age of Onset    Sleep apnea Mother     ADD / ADHD Mother     Anemia Mother     Diabetes Mother     Hypertension Mother     Mental illness Mother     Obesity Mother     Diabetes Father     Hypertension Father     Hyperlipidemia Father     Arthritis Maternal Grandmother     Heart attack Paternal Grandmother      Social History     Tobacco Use    Smoking status: Never    Smokeless tobacco: Never   Vaping Use    Vaping Use: Some days    Substances: Nicotine   Substance Use Topics    Alcohol  use: Yes     Alcohol/week: 10.0 standard drinks of alcohol     Types: 10 Cans of beer per week    Drug use: No     Medications Prior to Admission   Medication Sig Dispense Refill Last Dose    clonazePAM (KlonoPIN) 1 MG tablet Take 1 tablet by mouth 2 (Two) Times a Day As Needed for Anxiety.       ondansetron ODT (ZOFRAN-ODT) 8 MG disintegrating tablet Place 1 tablet on the tongue Every 8 (Eight) Hours As Needed for Nausea or Vomiting. 25 tablet 2     promethazine (PHENERGAN) 25 MG tablet Take 0.5 tablets by mouth Every 6 (Six) Hours As Needed for Nausea or Vomiting. 10 tablet 0     Scopolamine 1 MG/3DAYS patch Place 1 patch on the skin as directed by provider Every 72 (Seventy-Two) Hours. 10 patch 0     sildenafil (Viagra) 100 MG tablet Take 1 tablet by mouth Daily As Needed for Erectile Dysfunction. 30 tablet 3      Allergies:  Patient has no known allergies.      Review of Systems    Review of Systems   Unable to perform ROS: Mental status change        Objective     Vital Signs  Temp:  [97.7 °F (36.5 °C)-99.5 °F (37.5 °C)] 97.7 °F (36.5 °C)  Heart Rate:  [] 124  Resp:  [16-22] 18  BP: (125-156)/() 138/79    Physical Exam:  General:    Awake, alert but confused, in no acute distress   Head:    Normocephalic, atraumatic   Eyes:           PERRLA, EOMI. Conjunctivae and sclerae normal. No icterus or pallor.   Ears:    Ears appear intact with no abnormalities noted.   Throat:   No oral lesions or thrush. Oral mucosa moist   Heart:      Normal S1 and S2. Tachycardic. No significant murmur, rubs or gallops appreciated.   Lungs:     Respirations regular, even and unlabored. Lungs clear to auscultation B/L. No wheezes, rales or rhonchi.   Abdomen:   Soft and nontender. No guarding, rebound tenderness or  organomegaly noted. Bowel sounds present x 4.   MS:   No joint swelling, deformity, or tenderness.   Extremities:  No clubbing, cyanosis or edema noted. Moves UE and LE equally B/L.   Pulses:   Pulses palpable  and equal bilaterally.   Skin:   No bleeding, bruising or rash.   Lymph nodes:   No palpable adenopathy   Neurologic:   Oriented to person only. Follows a few simple commands. No gross focal deficits appreciated.        Results Review:     I reviewed the patient's new imaging results and agree with the interpretation.  I reviewed the patient's other test results and agree with the interpretation.    Results from last 7 days   Lab Units 02/17/24  0951 02/16/24 1911   WBC 10*3/mm3 4.44 4.45   HEMOGLOBIN g/dL 14.3 16.8   PLATELETS 10*3/mm3 146 186     Results from last 7 days   Lab Units 02/17/24  0951 02/16/24 1911   SODIUM mmol/L 136 134*   POTASSIUM mmol/L 4.0 4.5   CHLORIDE mmol/L 96* 92*   CO2 mmol/L 25.3 23.1   BUN mg/dL 13 11   CREATININE mg/dL 0.87 0.94   CALCIUM mg/dL 9.2 9.3   GLUCOSE mg/dL 105* 88     Results from last 7 days   Lab Units 02/17/24  0951 02/16/24 1911   BILIRUBIN mg/dL 1.0 0.8   ALK PHOS U/L 133* 154*   AST (SGOT) U/L 202* 205*   ALT (SGPT) U/L 200* 243*     Results from last 7 days   Lab Units 02/17/24  0951 02/16/24 1911   MAGNESIUM mg/dL 1.6 1.9         Results from last 7 days   Lab Units 02/16/24 1911   HSTROP T ng/L <6       Imaging Results (Last 24 Hours)       ** No results found for the last 24 hours. **                Assessment & Plan   Alcohol abuse with withdrawal/DT's: Start scheduled Librium and Precedex. PRN benzodiazepines with CIWA protocol. Start vitamin supplementation including thiamine. Monitor and replace electrolytes. Safety and seizure precautions.     Benzodiazepine use disorder with withdrawal: Treatment as outlined above. Supportive treatment.     Elevated liver enzymes: Likely 2/2 alcohol abuse. Viral hepatitis panel non-reactive. Order US of liver. Check PTT, PT/INR. Repeat CMP in the AM.     Elevated lipase: Emesis and abdominal pain reported on ED visit to [facility] but reportedly improved on admission to detox unit. Repeat labs. Diet as tolerated.      DVT PPX: Lovenox     I discussed the patient's findings and my recommendations with patient and nursing staff.       Hay Sauceda DO  02/18/24  17:50 EST

## 2024-02-18 NOTE — NURSING NOTE
Notified Dr. Jimenez of patients behavior, elevated BP and CIWA. Pt continues to be confused looking for dogs and things that aren't there and talking to his dad who also is not present. New orders ntd.

## 2024-02-18 NOTE — NURSING NOTE
"Patient agitated with staff keeps wanting to walk around and is now wanting to lean forward when attempting to walk. Pt looking for his car keys so he can leave and ask staff \"hey have you seen any of those animals out here.\" MD made aware.  "

## 2024-02-18 NOTE — NURSING NOTE
Head to toe assessment completed. No injury observed at this time. Pt has no complaints or no s/s of distress ntd.
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
## 2024-02-18 NOTE — NURSING NOTE
"Notified MD of paitent behavior. Pt disoriented walking the hallways looking for mice and a cat. Pt asked \" why are my house doors locked I should be able to get out.\" Explained to patient that he is in the hospital and he's safe. Pt able to be redirected at this time.  "

## 2024-02-18 NOTE — PLAN OF CARE
Goal Outcome Evaluation:  Plan of Care Reviewed With: patient  Patient Agreement with Plan of Care: agrees     Progress: declining    Pt discharging to medical

## 2024-02-18 NOTE — PLAN OF CARE
Goal Outcome Evaluation:  Plan of Care Reviewed With: patient  Patient Agreement with Plan of Care: agrees     Progress: no change  Outcome Evaluation: Patient is a Direct Admit from [facility] in [place], [place]. Patient presents with nausea, vomiting, tremors, stomach pain, and HA. Patient reports he is here to detox off of alcohol. Patient reports he drinks about 1/5 of vodka or bourbon daily. Patient reports he has been drinking since 2018 after his mother passed away. History of alcohol abuse. No history of withdrawal seizures. Patient reports he went to [facility] in [place], [place]. for an episode of dizziness diagnosed with vertigo after workup including head CT and blood work and sent home with scopolamine and Phenergan.  Patient states last drink was 2 days ago.He does take clonazepam prescribed him twice daily, for severe anxiety and panic attacks. Rates anxiety and depression 10/10. Has feelings of helplessness and hopelessness. Appetite has been very poor reports he has not ate anything in about 5 days. Reports his sleep has been excessive. . . Lipase 238. Chloride 96. Reports that Zofran makes him sick. Hx: HTN and colitis.

## 2024-02-18 NOTE — NURSING NOTE
"Notified Dr. Jimenez that patient leaned against the column in the day room next to the phone and slid down into the floor. Pt states \"Im ok I just sat down to fast you guys are making it bigger than what it is Im ok.\" Sitter order obtained.  "

## 2024-02-18 NOTE — NURSING NOTE
Notified Dr. Jimenez of patients behavior still up and down out of bed and super unsteady leaning forward, pt is not listening to staff when trying to redirect, pt crawled in floor to look under chair cause he thought he saw mice. New order ntd for hospitalist consult.

## 2024-02-18 NOTE — PLAN OF CARE
Goal Outcome Evaluation:  Plan of Care Reviewed With: patient  Patient Agreement with Plan of Care: agrees      Pt rated anxiety 10/10, depression 8/10, and cravings 0/10. Pt denies SI/HI/AVH. Pt was given prn ativan, phenergan, and trazodone. Pt had appeared to have slept 5 hours tonight.

## 2024-02-19 ENCOUNTER — APPOINTMENT (OUTPATIENT)
Dept: ULTRASOUND IMAGING | Facility: HOSPITAL | Age: 34
DRG: 897 | End: 2024-02-19
Payer: COMMERCIAL

## 2024-02-19 PROBLEM — F13.20 SEDATIVE, HYPNOTIC OR ANXIOLYTIC USE DISORDER, SEVERE, DEPENDENCE: Status: ACTIVE | Noted: 2024-02-19

## 2024-02-19 PROBLEM — F10.20 ALCOHOL USE DISORDER, SEVERE, DEPENDENCE: Status: ACTIVE | Noted: 2024-02-17

## 2024-02-19 PROBLEM — R74.01 ELEVATED TRANSAMINASE LEVEL: Status: ACTIVE | Noted: 2024-02-19

## 2024-02-19 PROBLEM — R74.8 ELEVATED LIPASE: Status: ACTIVE | Noted: 2024-02-19

## 2024-02-19 LAB
ALBUMIN SERPL-MCNC: 4.3 G/DL (ref 3.5–5.2)
ALBUMIN/GLOB SERPL: 1.5 G/DL
ALP SERPL-CCNC: 117 U/L (ref 39–117)
ALT SERPL W P-5'-P-CCNC: 131 U/L (ref 1–41)
ANION GAP SERPL CALCULATED.3IONS-SCNC: 12.4 MMOL/L (ref 5–15)
APTT PPP: 27.6 SECONDS (ref 26.5–34.5)
AST SERPL-CCNC: 130 U/L (ref 1–40)
BASOPHILS # BLD AUTO: 0.03 10*3/MM3 (ref 0–0.2)
BASOPHILS NFR BLD AUTO: 0.5 % (ref 0–1.5)
BILIRUB SERPL-MCNC: 1.2 MG/DL (ref 0–1.2)
BUN SERPL-MCNC: 9 MG/DL (ref 6–20)
BUN/CREAT SERPL: 11.7 (ref 7–25)
CALCIUM SPEC-SCNC: 9.2 MG/DL (ref 8.6–10.5)
CHLORIDE SERPL-SCNC: 102 MMOL/L (ref 98–107)
CO2 SERPL-SCNC: 23.6 MMOL/L (ref 22–29)
CREAT SERPL-MCNC: 0.77 MG/DL (ref 0.76–1.27)
DEPRECATED RDW RBC AUTO: 44.3 FL (ref 37–54)
EGFRCR SERPLBLD CKD-EPI 2021: 122.7 ML/MIN/1.73
EOSINOPHIL # BLD AUTO: 0.1 10*3/MM3 (ref 0–0.4)
EOSINOPHIL NFR BLD AUTO: 1.6 % (ref 0.3–6.2)
ERYTHROCYTE [DISTWIDTH] IN BLOOD BY AUTOMATED COUNT: 12.4 % (ref 12.3–15.4)
FOLATE SERPL-MCNC: 17.3 NG/ML (ref 4.78–24.2)
GLOBULIN UR ELPH-MCNC: 2.9 GM/DL
GLUCOSE SERPL-MCNC: 103 MG/DL (ref 65–99)
HCT VFR BLD AUTO: 40.9 % (ref 37.5–51)
HGB BLD-MCNC: 14.1 G/DL (ref 13–17.7)
IMM GRANULOCYTES # BLD AUTO: 0.02 10*3/MM3 (ref 0–0.05)
IMM GRANULOCYTES NFR BLD AUTO: 0.3 % (ref 0–0.5)
INR PPP: 1.03 (ref 0.9–1.1)
LIPASE SERPL-CCNC: 671 U/L (ref 13–60)
LYMPHOCYTES # BLD AUTO: 1.31 10*3/MM3 (ref 0.7–3.1)
LYMPHOCYTES NFR BLD AUTO: 21.4 % (ref 19.6–45.3)
MAGNESIUM SERPL-MCNC: 1.9 MG/DL (ref 1.6–2.6)
MCH RBC QN AUTO: 33.3 PG (ref 26.6–33)
MCHC RBC AUTO-ENTMCNC: 34.5 G/DL (ref 31.5–35.7)
MCV RBC AUTO: 96.7 FL (ref 79–97)
MONOCYTES # BLD AUTO: 0.46 10*3/MM3 (ref 0.1–0.9)
MONOCYTES NFR BLD AUTO: 7.5 % (ref 5–12)
NEUTROPHILS NFR BLD AUTO: 4.19 10*3/MM3 (ref 1.7–7)
NEUTROPHILS NFR BLD AUTO: 68.7 % (ref 42.7–76)
NRBC BLD AUTO-RTO: 0 /100 WBC (ref 0–0.2)
PHOSPHATE SERPL-MCNC: 3 MG/DL (ref 2.5–4.5)
PLATELET # BLD AUTO: 115 10*3/MM3 (ref 140–450)
PMV BLD AUTO: 10.1 FL (ref 6–12)
POTASSIUM SERPL-SCNC: 3.8 MMOL/L (ref 3.5–5.2)
PROT SERPL-MCNC: 7.2 G/DL (ref 6–8.5)
PROTHROMBIN TIME: 14 SECONDS (ref 12.1–14.7)
RBC # BLD AUTO: 4.23 10*6/MM3 (ref 4.14–5.8)
SODIUM SERPL-SCNC: 138 MMOL/L (ref 136–145)
VIT B12 BLD-MCNC: 1178 PG/ML (ref 211–946)
WBC NRBC COR # BLD AUTO: 6.11 10*3/MM3 (ref 3.4–10.8)

## 2024-02-19 PROCEDURE — 85610 PROTHROMBIN TIME: CPT | Performed by: INTERNAL MEDICINE

## 2024-02-19 PROCEDURE — 80053 COMPREHEN METABOLIC PANEL: CPT | Performed by: INTERNAL MEDICINE

## 2024-02-19 PROCEDURE — 85025 COMPLETE CBC W/AUTO DIFF WBC: CPT | Performed by: INTERNAL MEDICINE

## 2024-02-19 PROCEDURE — 25010000002 PHENOBARBITAL PER 120 MG: Performed by: INTERNAL MEDICINE

## 2024-02-19 PROCEDURE — 85730 THROMBOPLASTIN TIME PARTIAL: CPT | Performed by: INTERNAL MEDICINE

## 2024-02-19 PROCEDURE — 83735 ASSAY OF MAGNESIUM: CPT | Performed by: INTERNAL MEDICINE

## 2024-02-19 PROCEDURE — 76705 ECHO EXAM OF ABDOMEN: CPT | Performed by: RADIOLOGY

## 2024-02-19 PROCEDURE — 83690 ASSAY OF LIPASE: CPT | Performed by: INTERNAL MEDICINE

## 2024-02-19 PROCEDURE — 84100 ASSAY OF PHOSPHORUS: CPT | Performed by: INTERNAL MEDICINE

## 2024-02-19 PROCEDURE — 76705 ECHO EXAM OF ABDOMEN: CPT

## 2024-02-19 PROCEDURE — 25010000002 ENOXAPARIN PER 10 MG: Performed by: INTERNAL MEDICINE

## 2024-02-19 PROCEDURE — 25010000002 DIAZEPAM PER 5 MG: Performed by: INTERNAL MEDICINE

## 2024-02-19 PROCEDURE — 82607 VITAMIN B-12: CPT | Performed by: INTERNAL MEDICINE

## 2024-02-19 PROCEDURE — 25810000003 SODIUM CHLORIDE 0.9 % SOLUTION: Performed by: INTERNAL MEDICINE

## 2024-02-19 PROCEDURE — 25010000002 THIAMINE HCL 200 MG/2ML SOLUTION: Performed by: INTERNAL MEDICINE

## 2024-02-19 PROCEDURE — 94761 N-INVAS EAR/PLS OXIMETRY MLT: CPT

## 2024-02-19 PROCEDURE — 99232 SBSQ HOSP IP/OBS MODERATE 35: CPT | Performed by: INTERNAL MEDICINE

## 2024-02-19 PROCEDURE — 82746 ASSAY OF FOLIC ACID SERUM: CPT | Performed by: INTERNAL MEDICINE

## 2024-02-19 RX ORDER — SODIUM CHLORIDE 9 MG/ML
40 INJECTION, SOLUTION INTRAVENOUS AS NEEDED
Status: DISCONTINUED | OUTPATIENT
Start: 2024-02-19 | End: 2024-02-20 | Stop reason: HOSPADM

## 2024-02-19 RX ORDER — SODIUM CHLORIDE 0.9 % (FLUSH) 0.9 %
10 SYRINGE (ML) INJECTION AS NEEDED
Status: DISCONTINUED | OUTPATIENT
Start: 2024-02-19 | End: 2024-02-20 | Stop reason: HOSPADM

## 2024-02-19 RX ORDER — SODIUM CHLORIDE 0.9 % (FLUSH) 0.9 %
10 SYRINGE (ML) INJECTION EVERY 12 HOURS SCHEDULED
Status: DISCONTINUED | OUTPATIENT
Start: 2024-02-19 | End: 2024-02-20 | Stop reason: HOSPADM

## 2024-02-19 RX ADMIN — LORAZEPAM 4 MG: 2 TABLET ORAL at 13:23

## 2024-02-19 RX ADMIN — SODIUM CHLORIDE 1.5 MCG/KG/HR: 9 INJECTION, SOLUTION INTRAVENOUS at 03:47

## 2024-02-19 RX ADMIN — PHENOBARBITAL SODIUM 65 MG: 65 INJECTION INTRAMUSCULAR; INTRAVENOUS at 14:43

## 2024-02-19 RX ADMIN — Medication 1 MG: at 09:57

## 2024-02-19 RX ADMIN — Medication 10 ML: at 08:28

## 2024-02-19 RX ADMIN — THIAMINE HYDROCHLORIDE 200 MG: 100 INJECTION, SOLUTION INTRAMUSCULAR; INTRAVENOUS at 21:04

## 2024-02-19 RX ADMIN — CHLORDIAZEPOXIDE HYDROCHLORIDE 50 MG: 25 CAPSULE ORAL at 14:43

## 2024-02-19 RX ADMIN — CHLORDIAZEPOXIDE HYDROCHLORIDE 50 MG: 25 CAPSULE ORAL at 08:43

## 2024-02-19 RX ADMIN — THIAMINE HYDROCHLORIDE 200 MG: 100 INJECTION, SOLUTION INTRAMUSCULAR; INTRAVENOUS at 14:43

## 2024-02-19 RX ADMIN — PHENOBARBITAL SODIUM 232.7 MG: 65 INJECTION INTRAMUSCULAR; INTRAVENOUS at 04:28

## 2024-02-19 RX ADMIN — ENOXAPARIN SODIUM 40 MG: 40 INJECTION SUBCUTANEOUS at 21:03

## 2024-02-19 RX ADMIN — Medication 1 TABLET: at 09:57

## 2024-02-19 RX ADMIN — PHENOBARBITAL SODIUM 232.7 MG: 65 INJECTION INTRAMUSCULAR; INTRAVENOUS at 01:30

## 2024-02-19 RX ADMIN — DIAZEPAM 5 MG: 5 INJECTION, SOLUTION INTRAMUSCULAR; INTRAVENOUS at 21:33

## 2024-02-19 RX ADMIN — LORAZEPAM 1 MG: 1 TABLET ORAL at 12:13

## 2024-02-19 RX ADMIN — Medication 10 ML: at 21:03

## 2024-02-19 RX ADMIN — THIAMINE HYDROCHLORIDE 200 MG: 100 INJECTION, SOLUTION INTRAMUSCULAR; INTRAVENOUS at 08:27

## 2024-02-19 RX ADMIN — CHLORDIAZEPOXIDE HYDROCHLORIDE 50 MG: 25 CAPSULE ORAL at 21:04

## 2024-02-19 NOTE — PAYOR COMM NOTE
"[facility]  NPI:[id number]    Utilization Review  Contact: Brinda Morillo RN  Phone: [phone]  Fax:[phone]    INITIATE INPATIENT AUTHORIZATION:  PT NAME : Nathaly Gross 1990 ID # [id number]    inpatient Medical admission   [facility]  [address] [place] [address], NPI [id number]  Fax: [phone]   Date of admission 02/18/2024, ICD: F10.939    Nathaly Lao (33 y.o. Male)       Date of Birth   1990    Social Security Number       Address   [facility] [place]ER [address] [place] [address]    Home Phone   [phone]    MRN   [id number]       Christian   Worship    Marital Status   Significant Other                            Admission Date   2/18/24    Admission Type   Urgent    Admitting Provider   Hay Sauceda DO    Attending Provider   Valeriy Clement DO    Department, Room/Bed   [facility] CRITICAL CARE, [facility]/       Discharge Date       Discharge Disposition       Discharge Destination                                 Attending Provider: Valeriy Clement DO    Allergies: No Known Allergies    Isolation: None   Infection: None   Code Status: CPR    Ht: 182.9 cm (72\")   Wt: 76.9 kg (169 lb 8.5 oz)    Admission Cmt: None   Principal Problem: Alcohol withdrawal [F10.939]                   Active Insurance as of 2/18/2024       Primary Coverage       Payor Plan Insurance Group Employer/Plan Group    ANTHEM BLUE CROSS ANTHEM BLUE [place] BLUE [facility] [id number]       Payor Plan Address Payor Plan Phone Number Payor Plan Fax Number Effective Dates    [address] [phone]  1/1/2020 - None Entered    [facility] [id number]         Subscriber Name Subscriber Birth Date Member ID       NATHALY LAO 1990 [id number]                     Emergency Contacts        (Rel.) Home Phone Work Phone Mobile Phone    SANTOS LAO (Father) [phone] -- [phone]                 History & " Physical        Hay Sauceda DO at 02/18/24 1750              [facility] HOSPITALIST HISTORY AND PHYSICAL      Admit Date: 2/18/2024        Chief complaint Detox    Subjective    Tr Brito is a 33 y.o. male initially admitted to the detox unit on 2/17 for treatment of alcohol and benzodiazepine dependence. He was started on Ativan detox protocol in addition to supplemental Librium. Despite these interventions, he developed worsening withdrawal with worsening confusion and hallucinations requiring more frequent interventions and escalating PRN medications. The hospitalist service was ultimately consulted and upon evaluation, the decision was made to transfer patient to the CCU for closer monitoring.     Upon my evaluation, patient is alert with RN and staff at bedside. His oriented to person only and unable to provide any reliable history. Appears to be experiencing visual hallucinations during my encounter. Appears restless requiring frequent redirection from staff.       History  Past Medical History:   Diagnosis Date    Anxiety     Colitis     Depression     Erectile dysfunction     History of alcohol use disorder     Hypertension      Past Surgical History:   Procedure Laterality Date    ENDOSCOPY N/A 01/22/2023    Procedure: ESOPHAGOGASTRODUODENOSCOPY WITH BIOPSY;  Surgeon: Az Abbasi MD;  Location: [facility] ENDOSCOPY;  Service: Gastroenterology;  Laterality: N/A;    NOSE SURGERY      fracture    WISDOM TOOTH EXTRACTION       Family History   Problem Relation Age of Onset    Sleep apnea Mother     ADD / ADHD Mother     Anemia Mother     Diabetes Mother     Hypertension Mother     Mental illness Mother     Obesity Mother     Diabetes Father     Hypertension Father     Hyperlipidemia Father     Arthritis Maternal Grandmother     Heart attack Paternal Grandmother      Social History     Tobacco Use    Smoking status: Never    Smokeless tobacco: Never   Vaping Use    Vaping Use:  Some days    Substances: Nicotine   Substance Use Topics    Alcohol use: Yes     Alcohol/week: 10.0 standard drinks of alcohol     Types: 10 Cans of beer per week    Drug use: No     Medications Prior to Admission   Medication Sig Dispense Refill Last Dose    clonazePAM (KlonoPIN) 1 MG tablet Take 1 tablet by mouth 2 (Two) Times a Day As Needed for Anxiety.       ondansetron ODT (ZOFRAN-ODT) 8 MG disintegrating tablet Place 1 tablet on the tongue Every 8 (Eight) Hours As Needed for Nausea or Vomiting. 25 tablet 2     promethazine (PHENERGAN) 25 MG tablet Take 0.5 tablets by mouth Every 6 (Six) Hours As Needed for Nausea or Vomiting. 10 tablet 0     Scopolamine 1 MG/3DAYS patch Place 1 patch on the skin as directed by provider Every 72 (Seventy-Two) Hours. 10 patch 0     sildenafil (Viagra) 100 MG tablet Take 1 tablet by mouth Daily As Needed for Erectile Dysfunction. 30 tablet 3      Allergies:  Patient has no known allergies.      Review of Systems    Review of Systems   Unable to perform ROS: Mental status change        Objective    Vital Signs  Temp:  [97.7 °F (36.5 °C)-99.5 °F (37.5 °C)] 97.7 °F (36.5 °C)  Heart Rate:  [] 124  Resp:  [16-22] 18  BP: (125-156)/() 138/79    Physical Exam:  General:    Awake, alert but confused, in no acute distress   Head:    Normocephalic, atraumatic   Eyes:           PERRLA, EOMI. Conjunctivae and sclerae normal. No icterus or pallor.   Ears:    Ears appear intact with no abnormalities noted.   Throat:   No oral lesions or thrush. Oral mucosa moist   Heart:      Normal S1 and S2. Tachycardic. No significant murmur, rubs or gallops appreciated.   Lungs:     Respirations regular, even and unlabored. Lungs clear to auscultation B/L. No wheezes, rales or rhonchi.   Abdomen:   Soft and nontender. No guarding, rebound tenderness or  organomegaly noted. Bowel sounds present x 4.   MS:   No joint swelling, deformity, or tenderness.   Extremities:  No clubbing, cyanosis or  edema noted. Moves UE and LE equally B/L.   Pulses:   Pulses palpable and equal bilaterally.   Skin:   No bleeding, bruising or rash.   Lymph nodes:   No palpable adenopathy   Neurologic:   Oriented to person only. Follows a few simple commands. No gross focal deficits appreciated.        Results Review:     I reviewed the patient's new imaging results and agree with the interpretation.  I reviewed the patient's other test results and agree with the interpretation.    Results from last 7 days   Lab Units 02/17/24  0951 02/16/24 1911   WBC 10*3/mm3 4.44 4.45   HEMOGLOBIN g/dL 14.3 16.8   PLATELETS 10*3/mm3 146 186     Results from last 7 days   Lab Units 02/17/24  0951 02/16/24  1911   SODIUM mmol/L 136 134*   POTASSIUM mmol/L 4.0 4.5   CHLORIDE mmol/L 96* 92*   CO2 mmol/L 25.3 23.1   BUN mg/dL 13 11   CREATININE mg/dL 0.87 0.94   CALCIUM mg/dL 9.2 9.3   GLUCOSE mg/dL 105* 88     Results from last 7 days   Lab Units 02/17/24  0951 02/16/24 1911   BILIRUBIN mg/dL 1.0 0.8   ALK PHOS U/L 133* 154*   AST (SGOT) U/L 202* 205*   ALT (SGPT) U/L 200* 243*     Results from last 7 days   Lab Units 02/17/24  0951 02/16/24  1911   MAGNESIUM mg/dL 1.6 1.9         Results from last 7 days   Lab Units 02/16/24  1911   HSTROP T ng/L <6       Imaging Results (Last 24 Hours)       ** No results found for the last 24 hours. **                Assessment & Plan  Alcohol abuse with withdrawal/DT's: Start scheduled Librium and Precedex. PRN benzodiazepines with CIWA protocol. Start vitamin supplementation including thiamine. Monitor and replace electrolytes. Safety and seizure precautions.     Benzodiazepine use disorder with withdrawal: Treatment as outlined above. Supportive treatment.     Elevated liver enzymes: Likely 2/2 alcohol abuse. Viral hepatitis panel non-reactive. Order US of liver. Check PTT, PT/INR. Repeat CMP in the AM.     Elevated lipase: Emesis and abdominal pain reported on ED visit to [facility] but reportedly  improved on admission to detox unit. Repeat labs. Diet as tolerated.     DVT PPX: Lovenox     I discussed the patient's findings and my recommendations with patient and nursing staff.       Hay Sauceda DO  02/18/24  17:50 EST       Electronically signed by Hay Sauceda DO at 02/18/24 2033       Emergency Department Notes    No notes of this type exist for this encounter.       Facility-Administered Medications as of 2/19/2024   Medication Dose Route Frequency Provider Last Rate Last Admin    acetaminophen (TYLENOL) tablet 650 mg  650 mg Oral Q4H PRN Hay Sauceda DO        Or    acetaminophen (TYLENOL) suppository 650 mg  650 mg Rectal Q4H PRN Hay Sauceda DO        sennosides-docusate (PERICOLACE) 8.6-50 MG per tablet 2 tablet  2 tablet Oral BID Hay Sauceda DO   2 tablet at 02/18/24 2105    And    polyethylene glycol (MIRALAX) packet 17 g  17 g Oral Daily PRN Hay Sauceda DO        And    bisacodyl (DULCOLAX) EC tablet 5 mg  5 mg Oral Daily PRN Hay Sauceda DO        And    bisacodyl (DULCOLAX) suppository 10 mg  10 mg Rectal Daily PRN Hay Sauceda DO        [COMPLETED] chlordiazePOXIDE (LIBRIUM) capsule 50 mg  50 mg Oral Once Dax Jimenez MD   50 mg at 02/18/24 1559    chlordiazePOXIDE (LIBRIUM) capsule 50 mg  50 mg Oral Q8H Hay Sauceda DO   50 mg at 02/18/24 2106    DEXMEDETOMIDINE 1000 MCG/250ML INFUSION infusion  0.2-1.5 mcg/kg/hr Intravenous Titrated Hay Sauceda DO 8.7 mL/hr at 02/19/24 0839 0.4 mcg/kg/hr at 02/19/24 0839    diazePAM (VALIUM) injection 5 mg  5 mg Intravenous Q4H PRN Hay Sauceda DO   5 mg at 02/18/24 1946    Followed by    [START ON 2/23/2024] diazePAM (VALIUM) injection 5 mg  5 mg Intravenous Q4H PRN Hay Sauceda DO        LORazepam (ATIVAN) tablet 1 mg  1 mg Oral Q1H PRN Hay Sauceda DO        Or    diazePAM (VALIUM) injection 5 mg  5 mg  Intravenous Q4H PRN Hya Sauceda DO        Or    LORazepam (ATIVAN) tablet 2 mg  2 mg Oral Q1H PRN Hay Sauceda DO   2 mg at 24 210    Or    diazePAM (VALIUM) injection 5 mg  5 mg Intravenous Q4H PRN Hay Sauceda DO        Or    diazePAM (VALIUM) injection 5 mg  5 mg Intravenous Q4H PRN Hay Sauceda DO        Or    diazePAM (VALIUM) injection 5 mg  5 mg Intravenous Q4H PRN Hay Sauceda DO        Or    LORazepam (ATIVAN) tablet 4 mg  4 mg Oral Q1H PRN Hay Sauceda DO        Or    diazePAM (VALIUM) injection 5 mg  5 mg Intravenous Q4H PRN Hay Sauceda DO        [COMPLETED] diphenhydrAMINE (BENADRYL) injection 50 mg  50 mg Intramuscular Once Dax Jimenez MD   50 mg at 24 1317    [COMPLETED] diphenhydrAMINE (BENADRYL) injection 50 mg  50 mg Intramuscular Once Dax Jimenez MD   50 mg at 24 1600    Enoxaparin Sodium (LOVENOX) syringe 40 mg  40 mg Subcutaneous Nightly Hay Sauceda DO   40 mg at 24    folic acid (FOLVITE) tablet 1 mg  1 mg Oral Daily Hay Sauceda DO        [COMPLETED] haloperidol lactate (HALDOL) injection 5 mg  5 mg Intramuscular Once Dax Jimenez MD   5 mg at 24 1317    [COMPLETED] haloperidol lactate (HALDOL) injection 5 mg  5 mg Intramuscular Once Dax Jimenez MD   5 mg at 24 1600    [COMPLETED] haloperidol lactate (HALDOL) injection 5 mg  5 mg Intravenous Once Taqueria Hope MD   5 mg at 24 2130    [] LORazepam (ATIVAN) tablet 2 mg  2 mg Oral Q2H PRN Dax Jimenez MD   2 mg at 24 0044    Magnesium Standard Dose Replacement - Follow Nurse / BPA Driven Protocol   Does not apply PRN Hay Sauceda DO        multivitamin with minerals 1 tablet  1 tablet Oral Daily Hay Sauceda DO        nitroglycerin (NITROSTAT) SL tablet 0.4 mg  0.4 mg Sublingual Q5 Min PRN Hay Sauceda,          ondansetron (ZOFRAN) injection 4 mg  4 mg Intravenous Q6H PRN Hay Sauceda DO        [COMPLETED] PHENobarbital 310.7 mg in sodium chloride 0.9 % 100 mL IVPB  4 mg/kg (Ideal) Intravenous Once Taqueria Hope  mL/hr at 02/18/24 2153 310.7 mg at 02/18/24 2153    Followed by    [COMPLETED] PHENobarbital 232.7 mg in sodium chloride 0.9 % 100 mL IVPB  3 mg/kg (Ideal) Intravenous Once Taqueria Hope  mL/hr at 02/19/24 0130 232.7 mg at 02/19/24 0130    Followed by    [COMPLETED] PHENobarbital 232.7 mg in sodium chloride 0.9 % 100 mL IVPB  3 mg/kg (Ideal) Intravenous Once Taqueria Hope  mL/hr at 02/19/24 0428 232.7 mg at 02/19/24 0428    PHENobarbital injection 65 mg  65 mg Intravenous Once Taqueria Hope MD        Followed by    [START ON 2/20/2024] PHENobarbital injection 65 mg  65 mg Intravenous Once Taqueria Hope MD        Followed by    [START ON 2/20/2024] PHENobarbital tablet 32.4 mg  32.4 mg Oral Once Taqueria Hope MD        Followed by    [START ON 2/21/2024] PHENobarbital tablet 32.4 mg  32.4 mg Oral Once Taqueria Hope MD        Followed by    [START ON 2/21/2024] PHENobarbital tablet 32.4 mg  32.4 mg Oral Once Taqueria Hope MD        sodium chloride 0.9 % flush 10 mL  10 mL Intravenous Q12H Hay Sauceda DO   10 mL at 02/19/24 0828    sodium chloride 0.9 % flush 10 mL  10 mL Intravenous PRN Hay Sauceda DO        sodium chloride 0.9 % flush 10 mL  10 mL Intravenous Q12H Sadi Johnston MD   10 mL at 02/19/24 0828    sodium chloride 0.9 % flush 10 mL  10 mL Intravenous PRN Sadi Johnston MD        sodium chloride 0.9 % infusion 40 mL  40 mL Intravenous PRN Hay Sauceda DO        sodium chloride 0.9 % infusion 40 mL  40 mL Intravenous PRN Sadi Johnston MD        thiamine (B-1) injection 200 mg  200 mg Intravenous Q8H Hay Sauceda DO   200  mg at 02/19/24 0827    Followed by    [START ON 2/25/2024] thiamine (VITAMIN B-1) tablet 100 mg  100 mg Oral Daily Hay Sauceda DO            Physician Progress Notes (all)        Sadi Johnston MD at 02/18/24 2201          Hospitalist Update:    Notified by lead RN that patient required brief hold due to agitation and combativeness related to worsening alcohol withdrawal/delirium tremens. Has received valium, and precedex infusion is now at max rate. EICU has ordered phenobarbital and patient just received first dose. Currently he is lying in bed, eyes closed, with security standing around him. He will occasionally open his eyes, raise up in bed and try to get out of bed and mumble some words. Security informed me that just before I arrived, he was literally attempting to punch them. Hopefully addition of phenobarbital will help with this behavior. If he continues to get worse, will need to consider intubating.     Electronically signed by Sadi Johnston MD at 02/18/24 2205       Progress Notes signed by Taqueria Hope MD at 02/18/24 2256           Nurse Practitioner - Brief Progress Note  PERMANENT  02/18/2024 21:58    [facility] - U - 10 - [place], [place] ([facility])    NATHALY LAO.    Date of Service 02/18/2024 21:58    HPI/Events of Note Counts include 234 beds at the [facility] Provider Assessment Note      33-year-old male admitted to the CCU for alcohol withdrawal.  The patient was admitted to the detox unit on February 17th for treatment of alcohol and benzodiazepine dependence.  The patient was started on Ativan detox protocol, in addition to   supplemental Librium.  Despite these interventions the patient developed worsening withdrawal symptoms associated with confusion and hallucination.  The patient was referred for admission to inpatient, at this point.  Since his arrival to the CCU, the   patient was seen during an Elert by Dr. Herminia MD, [facility], , for  severe agitation and combativeness despite max dose Precedex and PRN Ativan.  Haldol x 1 ordered, and Phenobarbital added to regimen.          CIWA, thiamine, folic acid, and MVI.  Cont Precedex, Librium and Phenobarbital.     Transaminitis (, , TBili 1.0).  Liver US pending.  Acute hepatitis panel negative.     Sz precautions.                     PMH:  Anxiety, colitis, depression, ED, hypertension, alcohol abuse, smoking           VS: T 98.7, HR 86, /112 (115), RR 22, Sat 97%                      __X___   Video Assessment performed     __X___   Most recent labs reviewed     __X___   Vital Signs reviewed     __X___   Best Practices addressed:                    VTE prophylaxis: LMWH                    SUP (when indicated): Protonix                    Current Glucose: 105                         Please notify bedside physician when present or [facility] if glc > 180 X 2                    Sepsis guidelines: NA                    Lung protective strategy: NA                               _____     Spoke with bedside RN     _____     Orders written                 [facility] any needs if bedside physician is not present.          Please note this report has been generated using speech recognition software and may contain errors related to the system including punctuations, spelling errors as well as words and phrases that maybe inappropriate.  If there are any questions or   concerns, please feel free to contact the dictating provider for clarification.      Interventions Major-Delirium, psychosis, severe agitation - evaluation and management        Electronically Signed by: Ellis Markham) on 02/18/2024 21:59    Annotated By: Taqueria Hope)    Date: 02/18/24 22:56  33/M admitted with ETOH withdrawal  I had responded to an elert earlier and placed orders for Phenobarbitol protocol (See separate brief progress note)  Above note reviewed and  agree    Electronically signed by Taqueria Hope MD at 02/18/24 7109       Progress Notes signed by Taqueria Hope MD at 02/18/24 2128           THC Physician - Brief Progress Note  PERMANENT  02/18/2024 21:27    [facility] - CCU - 10 - [place], [place] ([facility])    NATHALY LAO    Date of Service 02/18/2024 21:27    HPI/Events of Note Responded to Stephanie  RN informed patient is 33/M admitted for ETOH withdrawal and is significantly agitated and trying to get out bed, pulling on lines despite max dosess of Precedex and IV Ativan PRN  Discussed and suggested Haldol 5 mg IV X 1 and to initiate CIWA protocol with addition of Phenobarbital to current regimen      Interventions Major-Change in mental status - evaluation and management, Other: Elert        Electronically Signed by: Taqueria Hope) on 02/18/2024 21:28    Electronically signed by Taqueria Hope MD at 02/18/24 2128       Consult Notes (all)    No notes of this type exist for this encounter.

## 2024-02-19 NOTE — NURSING NOTE
Patient extremely agitated, confused, and combative. Attempting to hit and bite staff. Patient removed  monitoring equipment. Attempts to redirect patient were unsuccessful. Security called to bedside at 2019 with brief hold beginning at 2020. Haldol administered per orders. AICU aware of situation. VS Temp 98.7. 153/105. RR 22. Pulse 98. Oxygen saturation 97% on room air.

## 2024-02-19 NOTE — NURSING NOTE
Patient requested personal phone at bedside.phone and  returned from security and are at bedside. Other belongings sent to security including black wallet, 2 car keys, insurance cards, credit cards, social security card, and drivers license. Security bag # [id number]

## 2024-02-19 NOTE — NURSING NOTE
Patient released from brief hold beginning at 2020. Assessment completed per primary RN. No injury noted. No complaints voiced. Patient remains disoriented to place, time, and situation. Not harming to self or others currently. /103, RR 22. Unable to obtain pulse, temperature or oxygen saturation at this time. Sitter remains at bedside

## 2024-02-19 NOTE — PLAN OF CARE
Problem: Restraint, Violent or Self-Destructive  Goal: Absence of Harm or Injury  Outcome: Ongoing, Progressing  Intervention: Implement Least Restrictive Safety Strategies  Recent Flowsheet Documentation  Taken 2/18/2024 2020 by Aparna Alaniz, RN  Medical Device Protection:   IV pole/bag removed from visual field   torso covered   tubing secured  Less Restrictive Alternative:   1:1 observation maintained   appropriate expression promoted   calming techniques promoted   environment adjusted  De-Escalation Techniques: (Precedex infusing)   appropriate behavior reinforced   reoriented   stimulation decreased   verbally redirected   medication administered   diversional activity encouraged  Diversional Activities: television   Goal Outcome Evaluation:

## 2024-02-19 NOTE — NURSING NOTE
1 hour face to face following a brief hold that began at 2020 and ended at 2130.  Patient calm and resting in bed. No signs of agitation or aggression noted. /103.pulse 67 RR 21 oxygen sat 96%. Reported to Dr. Johnston and need for brief hold to end met per Dr. Johnston.

## 2024-02-19 NOTE — PROGRESS NOTES
THC Physician - Brief Progress Note  PERMANENT  02/18/2024 21:27    [facility] [place] - CCU - 10 - [place], [place] ([facility])    NATHALY LAO    Date of Service 02/18/2024 21:27    HPI/Events of Note Responded to Elert  RN informed patient is 33/M admitted for ETOH withdrawal and is significantly agitated and trying to get out bed, pulling on lines despite max dosess of Precedex and IV Ativan PRN  Discussed and suggested Haldol 5 mg IV X 1 and to initiate CIWA protocol with addition of Phenobarbital to current regimen      Interventions Major-Change in mental status - evaluation and management, Other: Elert        Electronically Signed by: Taqueria Hope) on 02/18/2024 21:28

## 2024-02-19 NOTE — PLAN OF CARE
Goal Outcome Evaluation:  Plan of Care Reviewed With: patient        Progress: improving  Outcome Evaluation: Pt is AOx4 on room air. Precedex titrated off during shift and PO meds given for CIWA protocol. Afebrile throughout shift. Adequate uop. Up in chair. Complains of hip pain. See mar. Sitter D/C and doing well. Call light in reach. Care ongoing.

## 2024-02-19 NOTE — CASE MANAGEMENT/SOCIAL WORK
Discharge Planning Assessment   [facility]     Patient Name: Tr Brito  MRN: [id number]  Today's Date: 2/19/2024    Admit Date: 2/18/2024    Plan: SS received nursing consult for dc planning. SS spoke with pt at bedside. Pt lives with S/O Ashley and states he plans to return home at discharge. Pt does not utitlize HH services or DME at this time. Pt PCP Jorje Hurtado. Pt does not have POA/living will. Pt s/o to provide transportation at discharge. Pt has declined inpatient treatment for Alcohol and states he rather complete outpatient treatment. SS to follow and assist with discharge planning.   Discharge Needs Assessment       Row Name 02/19/24 1851       Living Environment    People in Home significant other    Name(s) of People in Home Ashley Bone - S/O    Current Living Arrangements apartment    Potentially Unsafe Housing Conditions none    In the past 12 months has the electric, gas, oil, or water company threatened to shut off services in your home? No    Primary Care Provided by self    Provides Primary Care For no one    Family Caregiver if Needed significant other;parent(s)    Family Caregiver Names S/O Ashley    Quality of Family Relationships helpful;involved;supportive    Able to Return to Prior Arrangements yes       Resource/Environmental Concerns    Resource/Environmental Concerns none       Transportation Needs    In the past 12 months, has lack of transportation kept you from medical appointments or from getting medications? no    In the past 12 months, has lack of transportation kept you from meetings, work, or from getting things needed for daily living? No       Food Insecurity    Within the past 12 months, you worried that your food would run out before you got the money to buy more. Never true    Within the past 12 months, the food you bought just didn't last and you didn't have money to get more. Never true       Transition Planning    Patient/Family Anticipates Transition to home     Transportation Anticipated family or friend will provide       Discharge Needs Assessment    Equipment Currently Used at Home none    Concerns to be Addressed discharge planning                Discharge Plan       Row Name 02/19/24 1852       Plan    Plan SS received nursing consult for dc planning. SS spoke with pt at bedside. Pt lives with S/O Ashley and states he plans to return home at discharge. Pt does not utitlize  services or DME at this time. Pt PCP Jorje Hurtado. Pt does not have POA/living will. Pt s/o to provide transportation at discharge. Pt has declined inpatient treatment for Alcohol and states he rather complete outpatient treatment. SS to follow and assist with discharge planning.                Demographic Summary       Row Name 02/19/24 1851       General Information    Admission Type inpatient    Arrived From psychiatric facility    Referral Source nursing    Reason for Consult discharge planning    Preferred Language English               QASIM Hanna

## 2024-02-19 NOTE — NURSING NOTE
Patient resting in bed. No longer agitated or combative. Remains oriented to self only. Sitter at bedside. Brief hold complete. /103 RR 22. Unable to obtain complete VS at this time.

## 2024-02-19 NOTE — PROGRESS NOTES
Hospitalist Update:    Notified by lead RN that patient required brief hold due to agitation and combativeness related to worsening alcohol withdrawal/delirium tremens. Has received valium, and precedex infusion is now at max rate. EICU has ordered phenobarbital and patient just received first dose. Currently he is lying in bed, eyes closed, with security standing around him. He will occasionally open his eyes, raise up in bed and try to get out of bed and mumble some words. Security informed me that just before I arrived, he was literally attempting to punch them. Hopefully addition of phenobarbital will help with this behavior. If he continues to get worse, will need to consider intubating.

## 2024-02-19 NOTE — DISCHARGE SUMMARY
"      PSYCHIATRIC DISCHARGE SUMMARY     Patient Identification:  Name:  Tr Brito  Age:  33 y.o.  Sex:  male  :  1990  MRN:  [id number]  Visit Number:  [id number]    Date of Admission:2024   Date of Discharge: 2024     Discharge Diagnosis:  Principal Problem:    Alcohol use disorder, severe, dependence  Active Problems:    Sedative, hypnotic or anxiolytic use disorder, severe, dependence    Elevated lipase    Elevated transaminase level      Admission Diagnosis:  Alcohol abuse [F10.10]     Hospital Course  Patient is a 33 y.o. male presented with alcohol and benzodiazepine dependence.  Admitted for medically assisted detox.  Admission labs with ALT//202, lipase 238, UDS + benzodiazepine.  Patient prescribed clonazepam, but there are reports that he also abused diazepam.  Patient started on Ativan detox protocol, which was quickly escalated with supplemental Librium due to worsening symptoms.  Patient developed increased confusion, instability, visual hallucinations, disorientation over the course of the day despite additional doses of Ativan, Librium, and three administrations of haloperidol 5 mg and diphenhydramine 50 mg IM.  Blood pressure and heart rate also continued to climb.  Due to severity of symptoms and concern for worsening DTs, hospitalist was consulted and elected to transfer patient to medical for more intensive care.    Mental Status Exam upon discharge:   Mood \"my dad is bringing a helicopter to get me\"   Affect-labile  Thought Content-delusional material present  Thought process-disorganized  Suicidality: No SI  Homicidality: No HI  Perception: +VH    Procedures Performed    Consults:   Consults       No orders found from 2024 to 2024.            Pertinent Test Results:   Lab Results (last 7 days)       Procedure Component Value Units Date/Time    Hepatitis Panel, Acute [[id number]]  (Normal) Collected: 24 0531    Specimen: Blood Updated: 24 " 0621     Hepatitis B Surface Ag Non-Reactive     Hep A IgM Non-Reactive     Hep B C IgM Non-Reactive     Hepatitis C Ab Non-Reactive    Narrative:      Results may be falsely decreased if patient taking Biotin.             Condition on Discharge:  guarded    Vital Signs  Temp:  [97.7 °F (36.5 °C)-99 °F (37.2 °C)] 97.8 °F (36.6 °C)  Heart Rate:  [] 71  Resp:  [17-26] 23  BP: (120-159)/() 120/93    Discharge Disposition:  Skilled Nursing Facility (DC - External)    Discharge Medications:     Discharge Medications        Stop These Medications      clonazePAM 1 MG tablet  Commonly known as: KlonoPIN     ondansetron ODT 8 MG disintegrating tablet  Commonly known as: ZOFRAN-ODT     promethazine 25 MG tablet  Commonly known as: PHENERGAN     Scopolamine 1 MG/3DAYS patch     sildenafil 100 MG tablet  Commonly known as: Viagra              Discharge Diet: Normal  Diet Instructions    Regular           Activity at Discharge: Normal  Activity Instructions    As tolerated           Follow-up Appointments  No future appointments.      Test Results Pending at Discharge  None     Time: I spent greater than 30 minutes on this discharge activity which included: face-to-face encounter with the patient, reviewing the data in the system, coordination of the care with the nursing staff as well as consultants, documentation, and entering orders.      Clinician:   Dax Jimenez MD  02/19/24  11:21 EST

## 2024-02-19 NOTE — PROGRESS NOTES
Nurse Practitioner - Brief Progress Note  PERMANENT  02/18/2024 21:58    [facility] - [facility] - [place], [place] ([facility])    NATHALY LAOAlok    Date of Service 02/18/2024 21:58    HPI/Events of Note [facility] Provider Assessment Note      33-year-old male admitted to the CCU for alcohol withdrawal.  The patient was admitted to the detox unit on February 17th for treatment of alcohol and benzodiazepine dependence.  The patient was started on Ativan detox protocol, in addition to   supplemental Librium.  Despite these interventions the patient developed worsening withdrawal symptoms associated with confusion and hallucination.  The patient was referred for admission to inpatient, at this point.  Since his arrival to the CCU, the   patient was seen during an Elert by Dr. Herminia MD, [facility], , for severe agitation and combativeness despite max dose Precedex and PRN Ativan.  Haldol x 1 ordered, and Phenobarbital added to regimen.          CIWA, thiamine, folic acid, and MVI.  Cont Precedex, Librium and Phenobarbital.     Transaminitis (, , TBili 1.0).  Liver US pending.  Acute hepatitis panel negative.     Sz precautions.                     PMH:  Anxiety, colitis, depression, ED, hypertension, alcohol abuse, smoking           VS: T 98.7, HR 86, /112 (115), RR 22, Sat 97%                      __X___   Video Assessment performed     __X___   Most recent labs reviewed     __X___   Vital Signs reviewed     __X___   Best Practices addressed:                    VTE prophylaxis: LMWH                    SUP (when indicated): Protonix                    Current Glucose: 105                         Please notify bedside physician when present or [facility] if glc > 180 X 2                    Sepsis guidelines: NA                    Lung protective strategy: NA                               _____     Spoke with bedside RN     _____     Orders written                  Contact [facility] for any needs if bedside physician is not present.          Please note this report has been generated using speech recognition software and may contain errors related to the system including punctuations, spelling errors as well as words and phrases that maybe inappropriate.  If there are any questions or   concerns, please feel free to contact the dictating provider for clarification.      Interventions Major-Delirium, psychosis, severe agitation - evaluation and management        Electronically Signed by: Ellis Markham) on 02/18/2024 21:59    Annotated By: Taqueria Hope)    Date: 02/18/24 22:56  33/M admitted with ETOH withdrawal  I had responded to an elert earlier and placed orders for Phenobarbitol protocol (See separate brief progress note)  Above note reviewed and agree

## 2024-02-20 ENCOUNTER — APPOINTMENT (OUTPATIENT)
Dept: GENERAL RADIOLOGY | Facility: HOSPITAL | Age: 34
DRG: 897 | End: 2024-02-20
Payer: COMMERCIAL

## 2024-02-20 ENCOUNTER — READMISSION MANAGEMENT (OUTPATIENT)
Dept: CALL CENTER | Facility: HOSPITAL | Age: 34
End: 2024-02-20
Payer: COMMERCIAL

## 2024-02-20 VITALS
WEIGHT: 177.47 LBS | BODY MASS INDEX: 24.04 KG/M2 | DIASTOLIC BLOOD PRESSURE: 99 MMHG | SYSTOLIC BLOOD PRESSURE: 123 MMHG | RESPIRATION RATE: 14 BRPM | TEMPERATURE: 98 F | OXYGEN SATURATION: 99 % | HEART RATE: 83 BPM | HEIGHT: 72 IN

## 2024-02-20 PROBLEM — F10.939 ALCOHOL WITHDRAWAL: Status: RESOLVED | Noted: 2024-02-18 | Resolved: 2024-02-20

## 2024-02-20 LAB
ANION GAP SERPL CALCULATED.3IONS-SCNC: 10.8 MMOL/L (ref 5–15)
BUN SERPL-MCNC: 13 MG/DL (ref 6–20)
BUN/CREAT SERPL: 14.1 (ref 7–25)
CALCIUM SPEC-SCNC: 8.7 MG/DL (ref 8.6–10.5)
CHLORIDE SERPL-SCNC: 97 MMOL/L (ref 98–107)
CO2 SERPL-SCNC: 25.2 MMOL/L (ref 22–29)
CREAT SERPL-MCNC: 0.92 MG/DL (ref 0.76–1.27)
DEPRECATED RDW RBC AUTO: 42.9 FL (ref 37–54)
EGFRCR SERPLBLD CKD-EPI 2021: 114.1 ML/MIN/1.73
ERYTHROCYTE [DISTWIDTH] IN BLOOD BY AUTOMATED COUNT: 12.2 % (ref 12.3–15.4)
GLUCOSE SERPL-MCNC: 118 MG/DL (ref 65–99)
HCT VFR BLD AUTO: 41.1 % (ref 37.5–51)
HGB BLD-MCNC: 14.3 G/DL (ref 13–17.7)
MCH RBC QN AUTO: 33.1 PG (ref 26.6–33)
MCHC RBC AUTO-ENTMCNC: 34.8 G/DL (ref 31.5–35.7)
MCV RBC AUTO: 95.1 FL (ref 79–97)
PLATELET # BLD AUTO: 130 10*3/MM3 (ref 140–450)
PMV BLD AUTO: 10.8 FL (ref 6–12)
POTASSIUM SERPL-SCNC: 3.3 MMOL/L (ref 3.5–5.2)
POTASSIUM SERPL-SCNC: 3.4 MMOL/L (ref 3.5–5.2)
QT INTERVAL: 374 MS
QTC INTERVAL: 431 MS
RBC # BLD AUTO: 4.32 10*6/MM3 (ref 4.14–5.8)
SODIUM SERPL-SCNC: 133 MMOL/L (ref 136–145)
WBC NRBC COR # BLD AUTO: 8.13 10*3/MM3 (ref 3.4–10.8)

## 2024-02-20 PROCEDURE — 25010000002 THIAMINE HCL 200 MG/2ML SOLUTION: Performed by: INTERNAL MEDICINE

## 2024-02-20 PROCEDURE — 73502 X-RAY EXAM HIP UNI 2-3 VIEWS: CPT | Performed by: RADIOLOGY

## 2024-02-20 PROCEDURE — 73502 X-RAY EXAM HIP UNI 2-3 VIEWS: CPT

## 2024-02-20 PROCEDURE — 99239 HOSP IP/OBS DSCHRG MGMT >30: CPT | Performed by: INTERNAL MEDICINE

## 2024-02-20 PROCEDURE — 80048 BASIC METABOLIC PNL TOTAL CA: CPT | Performed by: INTERNAL MEDICINE

## 2024-02-20 PROCEDURE — 85027 COMPLETE CBC AUTOMATED: CPT | Performed by: INTERNAL MEDICINE

## 2024-02-20 PROCEDURE — 25010000002 PHENOBARBITAL PER 120 MG: Performed by: INTERNAL MEDICINE

## 2024-02-20 PROCEDURE — 84132 ASSAY OF SERUM POTASSIUM: CPT | Performed by: INTERNAL MEDICINE

## 2024-02-20 RX ORDER — CHLORDIAZEPOXIDE HYDROCHLORIDE 25 MG/1
25 CAPSULE, GELATIN COATED ORAL EVERY 8 HOURS SCHEDULED
Status: DISCONTINUED | OUTPATIENT
Start: 2024-02-20 | End: 2024-02-20 | Stop reason: HOSPADM

## 2024-02-20 RX ORDER — POTASSIUM CHLORIDE 20 MEQ/1
40 TABLET, EXTENDED RELEASE ORAL EVERY 4 HOURS
Status: COMPLETED | OUTPATIENT
Start: 2024-02-20 | End: 2024-02-20

## 2024-02-20 RX ORDER — CHLORDIAZEPOXIDE HYDROCHLORIDE 5 MG/1
CAPSULE, GELATIN COATED ORAL
Qty: 12 CAPSULE | Refills: 0 | Status: SHIPPED | OUTPATIENT
Start: 2024-02-20 | End: 2024-02-24

## 2024-02-20 RX ADMIN — THIAMINE HYDROCHLORIDE 200 MG: 100 INJECTION, SOLUTION INTRAMUSCULAR; INTRAVENOUS at 05:38

## 2024-02-20 RX ADMIN — CHLORDIAZEPOXIDE HYDROCHLORIDE 50 MG: 25 CAPSULE ORAL at 05:38

## 2024-02-20 RX ADMIN — Medication 10 ML: at 08:44

## 2024-02-20 RX ADMIN — PHENOBARBITAL SODIUM 65 MG: 65 INJECTION INTRAMUSCULAR at 02:54

## 2024-02-20 RX ADMIN — Medication 1 MG: at 08:44

## 2024-02-20 RX ADMIN — THIAMINE HYDROCHLORIDE 200 MG: 100 INJECTION, SOLUTION INTRAMUSCULAR; INTRAVENOUS at 14:34

## 2024-02-20 RX ADMIN — POTASSIUM CHLORIDE 40 MEQ: 1500 TABLET, EXTENDED RELEASE ORAL at 08:43

## 2024-02-20 RX ADMIN — POTASSIUM CHLORIDE 40 MEQ: 1500 TABLET, EXTENDED RELEASE ORAL at 05:38

## 2024-02-20 RX ADMIN — CHLORDIAZEPOXIDE HYDROCHLORIDE 25 MG: 25 CAPSULE ORAL at 14:34

## 2024-02-20 RX ADMIN — LORAZEPAM 2 MG: 2 TABLET ORAL at 01:24

## 2024-02-20 RX ADMIN — PHENOBARBITAL 32.4 MG: 32.4 TABLET ORAL at 14:44

## 2024-02-20 RX ADMIN — Medication 1 TABLET: at 08:43

## 2024-02-20 NOTE — DISCHARGE SUMMARY
[facility]    Patient Identification:  Name:  Tr Brito  Age:  33 y.o.  Sex:  male  :  1990  MRN:  [id number]  Visit Number:  [id number]    Date of Admission: 2024  Date of Discharge: 2024    PCP: Jorje Hurtado MD    DISCHARGE DIAGNOSIS   Alcohol withdrawal      CONSULTS  None      PROCEDURES PERFORMED   None      HOSPITAL COURSE  Mr. Brito is a 33 y.o. male who was a direct admission from the [facility] on 2024 with a chief complaint of alcohol withdrawal.  Patient was initially admitted to the detox unit on 2024 for treatment of alcohol and benzodiazepine dependence.  Unfortunately, during that hospitalization, patient developed worsening withdrawal with confusion and hallucinations requiring more frequent interventions and escalating as needed medications.  As such, hospitalist service was requested to admit patient to the intensive care unit for further treatment and evaluation.    Patient was started on CIWA protocol with as needed Ativan and scheduled Librium.  Patient did require 2 days of intervention with these medications which did eventually result in adequate withdrawal treatment.  As needed IV medications were discontinued on the night of 2024.  Patient was continued on scheduled Librium on 2024 and had a CIWA score of 3 at time of discharge.  Patient has reported plans on attending outpatient substance abuse counseling and patient will be given a short taper of Librium on date of discharge.  Patient is alert, oriented to person, place and time and is requesting discharge home.  With this in mind, it is felt patient has reached maximum medical benefit of current hospitalization and will be discharged home in stable condition today.  The beforementioned plan was thoroughly discussed with the patient and he expressed his understanding and willingness to proceed with the beforementioned plan.  Patient was also given multiple points of contact to assist him in his search for an outpatient substance abuse rehabilitation program, including [facility], [facility] and the [facility].    VITAL SIGNS:      02/19/24  0600 02/20/24  0200 02/20/24  0500   Weight: 76.9 kg (169 lb 8.5 oz) 80.5 kg (177 lb 7.5 oz) 80.5 kg (177 lb 7.5 oz)     Body mass index is 24.07 kg/m².    PHYSICAL EXAM:  Constitutional: Well-nourished  male in no apparent distress.     HENT:  Head:  Normocephalic and atraumatic.  Mouth:  Moist mucous membranes.    Eyes:  Conjunctivae and EOM are normal.  Pupils are equal, round, and reactive to light.  No scleral icterus.    Neck:  Neck supple. No thyromegaly.  No JVD present.    Cardiovascular:  Regular rate and rhythm with no murmurs, rubs, clicks or gallops appreciated.  Pulmonary/Chest:  Clear to auscultation bilaterally with no crackles, wheezes or rhonchi appreciated.  Abdominal:  Soft. Nontender. Nondistended  Bowel sounds are normal in all four quadrants. No organomegally appreciated.   Musculoskeletal:  No edema, no tenderness, and no deformity.  No red or swollen joints anywhere.    Neurological:  Alert, Cranial nerves II-XII intact with no focal deficits.  No facial droop.  No slurred speech.   Skin:  Warm and dry to palpation with no rashes or lesions appreciated.  Peripheral vascular:  2+ radial and pedal pulses in bilateral upper and lower extremities.    DISCHARGE DISPOSITION   Stable    DISCHARGE MEDICATIONS:     Discharge Medications        New Medications        Instructions Start Date   chlordiazePOXIDE 5 MG capsule  Commonly known as: LIBRIUM   Take 2 capsules by mouth Every 8 (Eight) Hours for 1 day, THEN 1 capsule Every 8 (Eight) Hours for 1 day, THEN 1 capsule 2 (Two) Times a Day for 1 day, THEN 1 capsule Daily for 1 day.   Start Date: February 20, 2024            Continue These Medications        Instructions Start Date   clonazePAM 1 MG tablet  Commonly  known as: KlonoPIN   1 mg, Oral, 2 Times Daily PRN      ondansetron ODT 8 MG disintegrating tablet  Commonly known as: ZOFRAN-ODT   8 mg, Translingual, Every 8 Hours PRN             Stop These Medications      promethazine 25 MG tablet  Commonly known as: PHENERGAN     Scopolamine 1 MG/3DAYS patch     Viagra 100 MG tablet  Generic drug: sildenafil                     Follow-up Information       Jorje Hurtado MD .    Specialties: Internal Medicine, Emergency Medicine, Urgent Care, Hospitalist  Contact information:  [address] [id number] [phone]                             TEST  RESULTS PENDING AT DISCHARGE       Valeriy Clement DO  02/20/24  16:50 EST    Please note that this discharge summary required more than 30 minutes to complete.    Please send a copy of this dictation to the following providers:  Jorje Hurtado MD

## 2024-02-20 NOTE — PLAN OF CARE
Goal Outcome Evaluation:  Plan of Care Reviewed With: patient        Progress: improving  Outcome Evaluation: Pt A&O x4 on room air.  Pt has received PRN IV Valium x1 dose overnight for anxiety and PRN PO Ativan x1 dose per CIWA protocol overnight.  VSS.  Adequate UOP.  Complains of R Hip pain during ambulation.  Bed in lowest, locked position with bed alarm on and call bell in reach.

## 2024-02-20 NOTE — PROGRESS NOTES
[facility]     Patient Identification:  Name:  Tr Brito  Age:  33 y.o.  Sex:  male  :  1990  MRN:  [id number]  Visit Number:  [id number]  Primary Care Provider:  Jorje Hurtado MD    Length of stay:  1    Chief complaint: Confusion    Subjective:    Patient seen and examined this morning with sitter present.  Patient was asleep and difficult to arouse and per nursing reports had been confused and combative overnight.  However, patient demonstrated significant improvement this afternoon and was alert and oriented to person, place and time.  Sitter was able to be discontinued.  ----------------------------------------------------------------------------------------------------------------------  [facility] Meds:  chlordiazePOXIDE, 50 mg, Oral, Q8H  enoxaparin, 40 mg, Subcutaneous, Nightly  folic acid, 1 mg, Oral, Daily  multivitamin with minerals, 1 tablet, Oral, Daily  [START ON 2024] PHENobarbital, 65 mg, Intravenous, Once   Followed by  [START ON 2024] PHENobarbital, 32.4 mg, Oral, Once   Followed by  [START ON 2024] PHENobarbital, 32.4 mg, Oral, Once   Followed by  [START ON 2024] PHENobarbital, 32.4 mg, Oral, Once  senna-docusate sodium, 2 tablet, Oral, BID  sodium chloride, 10 mL, Intravenous, Q12H  sodium chloride, 10 mL, Intravenous, Q12H  thiamine (B-1) IV, 200 mg, Intravenous, Q8H   Followed by  [START ON 2024] thiamine, 100 mg, Oral, Daily      dexmedetomidine, 0.2-1.5 mcg/kg/hr, Last Rate: Stopped (24 1000)      ----------------------------------------------------------------------------------------------------------------------  Vital Signs:  Temp:  [97.8 °F (36.6 °C)-99 °F (37.2 °C)] 98.5 °F (36.9 °C)  Heart Rate:  [] 103  Resp:  [14-26] 20  BP: (103-159)/() 117/80      24  0600   Weight: 76.9 kg (169 lb 8.5 oz)     Body mass index is 22.99 kg/m².    Intake/Output Summary (Last 24 hours)  at 2/19/2024 1928  Last data filed at 2/19/2024 1400  Gross per 24 hour   Intake 897.96 ml   Output 1100 ml   Net -202.04 ml     Diet: Regular/House Diet; Texture: Regular Texture (IDDSI 7); Fluid Consistency: Thin (IDDSI 0)  ----------------------------------------------------------------------------------------------------------------------  Physical exam:  Constitutional: Well-nourished  male in no apparent distress.     HENT:  Head:  Normocephalic and atraumatic.  Mouth:  Moist mucous membranes.    Eyes:  Conjunctivae and EOM are normal.  Pupils are equal, round, and reactive to light.  No scleral icterus.    Neck:  Neck supple. No thyromegaly.  No JVD present.    Cardiovascular:  Regular rate and rhythm with no murmurs, rubs, clicks or gallops appreciated.  Pulmonary/Chest:  Clear to auscultation bilaterally with no crackles, wheezes or rhonchi appreciated.  Abdominal:  Soft. Nontender. Nondistended  Bowel sounds are normal in all four quadrants. No organomegally appreciated.   Musculoskeletal:  No edema, no tenderness, and no deformity.  No red or swollen joints anywhere.    Neurological:  Alert, Cranial nerves II-XII intact with no focal deficits.  No facial droop.  No slurred speech.   Skin:  Warm and dry to palpation with no rashes or lesions appreciated.  Peripheral vascular:  2+ radial and pedal pulses in bilateral upper and lower extremities.    ---------------------------------------------------------------------------------------------  ----------------------------------------------------------------------------------------------------------------------  Results from last 7 days   Lab Units 02/16/24 1911   HSTROP T ng/L <6     Results from last 7 days   Lab Units 02/19/24  0322 02/17/24  0951 02/16/24 1911   WBC 10*3/mm3 6.11 4.44 4.45   HEMOGLOBIN g/dL 14.1 14.3 16.8   HEMATOCRIT % 40.9 40.1 47.3   MCV fL 96.7 93.3 93.8   MCHC g/dL 34.5 35.7 35.5   PLATELETS 10*3/mm3 115* 146 186   INR   "1.03  --   --          Results from last 7 days   Lab Units 02/19/24  0322 02/17/24  0951 02/16/24  1911   SODIUM mmol/L 138 136 134*   POTASSIUM mmol/L 3.8 4.0 4.5   MAGNESIUM mg/dL 1.9 1.6 1.9   CHLORIDE mmol/L 102 96* 92*   CO2 mmol/L 23.6 25.3 23.1   BUN mg/dL 9 13 11   CREATININE mg/dL 0.77 0.87 0.94   CALCIUM mg/dL 9.2 9.2 9.3   GLUCOSE mg/dL 103* 105* 88   ALBUMIN g/dL 4.3 4.4 5.0   BILIRUBIN mg/dL 1.2 1.0 0.8   ALK PHOS U/L 117 133* 154*   AST (SGOT) U/L 130* 202* 205*   ALT (SGPT) U/L 131* 200* 243*   Estimated Creatinine Clearance: 151.2 mL/min (by C-G formula based on SCr of 0.77 mg/dL).    No results found for: \"AMMONIA\"      No results found for: \"BLOODCX\"  No results found for: \"URINECX\"  No results found for: \"WOUNDCX\"  No results found for: \"STOOLCX\"    I have personally looked at the labs and they are summarized above.  ----------------------------------------------------------------------------------------------------------------------  Imaging Results (Last 24 Hours)       Procedure Component Value Units Date/Time    US Liver [[id number]] Collected: 02/19/24 0830     Updated: 02/19/24 0833    Narrative:      PROCEDURE: US LIVER-     HISTORY: Elevated liver enzymes     TECHNIQUE: Sonographic images of the liver were obtained in the  transverse and sagittal planes with color flow and pulse Doppler.     COMPARISON: January 21, 2023      FINDINGS: The liver is echogenic consistent with fatty infiltration.  There is no evidence of a focal liver mass. The hepatic vasculature is  patent with normal directional flow. The gallbladder is unremarkable  with no shadowing stones or wall thickening. The common duct measures  4.3 mm. Limited images of the right kidney are unremarkable.       Impression:      Fatty infiltration of the liver.              This report was finalized on 2/19/2024 8:31 AM by Julius Godoy M.D..         "     ----------------------------------------------------------------------------------------------------------------------  Assessment and Plan:    Alcohol withdrawal -continue scheduled Librium and as needed benzodiazepines with CIWA protocol.  Continue thiamine replacement.    2.  Elevated liver enzymes -likely secondary to alcohol abuse.  Viral hepatitis panel is nonreactive.    Disposition possible discharge in the next 48 hours    Valeriy Clement,    02/19/24   19:28 EST

## 2024-02-20 NOTE — DISCHARGE INSTRUCTIONS
[facility]  [facility]     [facility]  [address]  [phone]    [place] [phone]

## 2024-02-21 ENCOUNTER — TRANSITIONAL CARE MANAGEMENT TELEPHONE ENCOUNTER (OUTPATIENT)
Dept: CALL CENTER | Facility: HOSPITAL | Age: 34
End: 2024-02-21
Payer: COMMERCIAL

## 2024-02-21 DIAGNOSIS — F41.9 ANXIETY: Primary | ICD-10-CM

## 2024-02-21 RX ORDER — CLONAZEPAM 1 MG/1
1 TABLET ORAL 2 TIMES DAILY PRN
Qty: 60 TABLET | Refills: 4 | Status: SHIPPED | OUTPATIENT
Start: 2024-02-21

## 2024-02-21 NOTE — TELEPHONE ENCOUNTER
Caller: Tr Brito    Relationship: Self    Best call back number:      Requested Prescriptions:   Requested Prescriptions     Pending Prescriptions Disp Refills    clonazePAM (KlonoPIN) 1 MG tablet       Sig: Take 1 tablet by mouth 2 (Two) Times a Day As Needed for Anxiety.        Pharmacy where request should be sent: [facility] [id number] [address] AT [facility] [phone] [facility] [phone] FX     Last office visit with prescribing clinician: 11/2/2023   Last telemedicine visit with prescribing clinician: Visit date not found   Next office visit with prescribing clinician: 2/28/2024     Additional details provided by patient: PATIENT IS OUT OF MEDICATION AND REQUESTING AN INCREASE IN DOSAGE (ER PHYSICIAN SUGGESTED THIS)    PATIENT WOULD ALSO LIKE A CALL BACK FROM DR COCHRAN    Does the patient have less than a 3 day supply:  [x] Yes  [] No      Les Rivera Rep   02/21/24 08:40 EST

## 2024-02-21 NOTE — PAYOR COMM NOTE
"[facility]  NPI:[id number]    Utilization Review  Contact: Brinda Morillo RN  Phone: [phone]  Fax:[phone]    DISCHARGE NOTIFICATION   [id number]       Nathaly Lao (33 y.o. Male)       Date of Birth   1990    Social Security Number       Address   [facility] [place] [address] [place] [address]    Home Phone   [phone]    MRN   [id number]       Protestant   Hinduism    Marital Status   Significant Other                            Admission Date   24    Admission Type   Urgent    Admitting Provider   Hay Sauceda DO    Attending Provider       Department, Room/Bed   [facility] CRITICAL CARE, 06/       Discharge Date   2024    Discharge Disposition   Home or Self Care    Discharge Destination                                 Attending Provider: (none)   Allergies: No Known Allergies    Isolation: None   Infection: None   Code Status: Prior    Ht: 182.9 cm (72\")   Wt: 80.5 kg (177 lb 7.5 oz)    Admission Cmt: None   Principal Problem: Alcohol withdrawal [F10.939]                   Active Insurance as of 2024       Primary Coverage       Payor Plan Insurance Group Employer/Plan Group    ANTH BLUE CROSS [facility] BLUE CROSS BLUE SHIELD O [id number]       Payor Plan Address Payor Plan Phone Number Payor Plan Fax Number Effective Dates    [address] [phone]  2020 - None Entered    [facility] [id number]         Subscriber Name Subscriber Birth Date Member ID       NATHALY LAO 1990 [id number]                     Emergency Contacts        (Rel.) Home Phone Work Phone Mobile Phone    SANTOS LAO (Father) [phone] -- [phone]                 Discharge Summary        Valeriy Clement DO at 24 1650              [facility] HOSPITALIST MEDICINE DISCHARGE SUMMARY    Patient Identification:  Name:  Nathaly Lao  Age:  33 y.o.  Sex:  male  :  1990  MRN:  " [id number]  Visit Number:  [id number]    Date of Admission: 2/18/2024  Date of Discharge: 2/20/2024    PCP: Jorje Hurtado MD    DISCHARGE DIAGNOSIS   Alcohol withdrawal      CONSULTS  None      PROCEDURES PERFORMED   None      HOSPITAL COURSE  Mr. Brito is a 33 y.o. male who was a direct admission from the [facility] on 2/18/2024 with a chief complaint of alcohol withdrawal.  Patient was initially admitted to the detox unit on 2/17/2024 for treatment of alcohol and benzodiazepine dependence.  Unfortunately, during that hospitalization, patient developed worsening withdrawal with confusion and hallucinations requiring more frequent interventions and escalating as needed medications.  As such, hospitalist service was requested to admit patient to the intensive care unit for further treatment and evaluation.    Patient was started on CIWA protocol with as needed Ativan and scheduled Librium.  Patient did require 2 days of intervention with these medications which did eventually result in adequate withdrawal treatment.  As needed IV medications were discontinued on the night of 2/19/2024.  Patient was continued on scheduled Librium on 2/20/2024 and had a CIWA score of 3 at time of discharge.  Patient has reported plans on attending outpatient substance abuse counseling and patient will be given a short taper of Librium on date of discharge.  Patient is alert, oriented to person, place and time and is requesting discharge home.  With this in mind, it is felt patient has reached maximum medical benefit of current hospitalization and will be discharged home in stable condition today.  The beforementioned plan was thoroughly discussed with the patient and he expressed his understanding and willingness to proceed with the beforementioned plan. Patient was also given multiple points of contact to assist him in his search for an outpatient substance abuse rehabilitation program, including [facility] and  the [facility].    VITAL SIGNS:      02/19/24  0600 02/20/24  0200 02/20/24  0500   Weight: 76.9 kg (169 lb 8.5 oz) 80.5 kg (177 lb 7.5 oz) 80.5 kg (177 lb 7.5 oz)     Body mass index is 24.07 kg/m².    PHYSICAL EXAM:  Constitutional: Well-nourished  male in no apparent distress.     HENT:  Head:  Normocephalic and atraumatic.  Mouth:  Moist mucous membranes.    Eyes:  Conjunctivae and EOM are normal.  Pupils are equal, round, and reactive to light.  No scleral icterus.    Neck:  Neck supple. No thyromegaly.  No JVD present.    Cardiovascular:  Regular rate and rhythm with no murmurs, rubs, clicks or gallops appreciated.  Pulmonary/Chest:  Clear to auscultation bilaterally with no crackles, wheezes or rhonchi appreciated.  Abdominal:  Soft. Nontender. Nondistended  Bowel sounds are normal in all four quadrants. No organomegally appreciated.   Musculoskeletal:  No edema, no tenderness, and no deformity.  No red or swollen joints anywhere.    Neurological:  Alert, Cranial nerves II-XII intact with no focal deficits.  No facial droop.  No slurred speech.   Skin:  Warm and dry to palpation with no rashes or lesions appreciated.  Peripheral vascular:  2+ radial and pedal pulses in bilateral upper and lower extremities.    DISCHARGE DISPOSITION   Stable    DISCHARGE MEDICATIONS:     Discharge Medications        New Medications        Instructions Start Date   chlordiazePOXIDE 5 MG capsule  Commonly known as: LIBRIUM   Take 2 capsules by mouth Every 8 (Eight) Hours for 1 day, THEN 1 capsule Every 8 (Eight) Hours for 1 day, THEN 1 capsule 2 (Two) Times a Day for 1 day, THEN 1 capsule Daily for 1 day.   Start Date: February 20, 2024            Continue These Medications        Instructions Start Date   clonazePAM 1 MG tablet  Commonly known as: KlonoPIN   1 mg, Oral, 2 Times Daily PRN      ondansetron ODT 8 MG disintegrating tablet  Commonly known as: ZOFRAN-ODT   8 mg, Translingual, Every 8 Hours PRN              Stop These Medications      promethazine 25 MG tablet  Commonly known as: PHENERGAN     Scopolamine 1 MG/3DAYS patch     Viagra 100 MG tablet  Generic drug: sildenafil                     Follow-up Information       Jorje Hurtado MD .    Specialties: Internal Medicine, Emergency Medicine, Urgent Care, Hospitalist  Contact information:  [address] [id number]  [phone]                             TEST  RESULTS PENDING AT DISCHARGE       Valeriy Clement DO  02/20/24  16:50 EST    Please note that this discharge summary required more than 30 minutes to complete.    Please send a copy of this dictation to the following providers:  Jorje Hurtado MD    Electronically signed by Valeriy Clement DO at 02/20/24 9096

## 2024-02-21 NOTE — OUTREACH NOTE
Call Center TCM Note      Flowsheet Row Responses   [facility] patient discharged from? [facility]   Does the patient have one of the following disease processes/diagnoses(primary or secondary)? Other   TCM attempt successful? Yes  [no current verbal release]   Call start time 1526   Call Status Left message   Call end time 1529   Discharge diagnosis Alcohol withdrawal   Meds reviewed with patient/caregiver? Yes   Is the patient having any side effects they believe may be caused by any medication additions or changes? No   Does the patient have all medications ordered at discharge? Yes   Is the patient taking all medications as directed (includes completed medication regime)? Yes   Comments Hospital f/u on 2/28/24@0815am   Does the patient have an appointment with their PCP within 7-14 days of discharge? Yes   Has home health visited the patient within 72 hours of discharge? N/A   Psychosocial issues? No   Psychosocial comments Pt reports he is 7 days sober-reports no issues physically but dealing with psychological effects, trying to keep himself occupied   Did the patient receive a copy of their discharge instructions? Yes   Nursing interventions Reviewed instructions with patient   What is the patient's perception of their health status since discharge? Improving  [Pt denies any issues with DTs at time of call and reports physically he is doing well and sober x 7 days.]   Is the patient/caregiver able to teach back signs and symptoms related to disease process for when to call PCP? Yes   Is the patient/caregiver able to teach back signs and symptoms related to disease process for when to call 911? Yes   TCM call completed? Yes   Call end time 1529            Mckenzie Ang RN    2/21/2024, 15:34 EST

## 2024-02-21 NOTE — OUTREACH NOTE
Prep Survey      Flowsheet Row Responses   [facility] facility patient discharged from? [facility]   Is LACE score < 7 ? Yes   Eligibility [facility]   Date of Admission 02/18/24   Date of Discharge 02/20/24   Discharge Disposition Home or Self Care   Discharge diagnosis Alcohol withdrawal   Does the patient have one of the following disease processes/diagnoses(primary or secondary)? Other   Does the patient have Home health ordered? No   Is there a DME ordered? No   Prep survey completed? Yes            CHRISTOPHER LUTZ - Registered Nurse

## 2024-02-23 ENCOUNTER — TELEPHONE (OUTPATIENT)
Dept: SLEEP MEDICINE | Facility: CLINIC | Age: 34
End: 2024-02-23
Payer: COMMERCIAL

## 2024-02-23 ENCOUNTER — TELEPHONE (OUTPATIENT)
Dept: INTERNAL MEDICINE | Facility: CLINIC | Age: 34
End: 2024-02-23
Payer: COMMERCIAL

## 2024-02-23 ENCOUNTER — TELEPHONE (OUTPATIENT)
Dept: SLEEP MEDICINE | Facility: HOSPITAL | Age: 34
End: 2024-02-23
Payer: COMMERCIAL

## 2024-02-23 NOTE — TELEPHONE ENCOUNTER
PT CALLED FOR HIS SLEEP STUDY RESULTS. HE WOULD LIKE TO PROCEED WITH PAP THERAPY. HE WOULD LIKE TO USE [facility] IN [place]. COMPLIANCE FU SCHEDULED

## 2024-02-23 NOTE — TELEPHONE ENCOUNTER
Your sleep study shows that you have obstructive sleep apnea (AHI 29.7) and the recommended treatment is PAP therapy. We do not supply medical equipment in our office so if you would like to move forward with PAP therapy, please respond to this message or call the office at [phone] to discuss further.       Thank you             AHI (Apnea Hypopnea Index) :   <5 - Normal   5 - 15 Mild   16 - 30 Moderate   >30 Severe

## 2024-02-23 NOTE — TELEPHONE ENCOUNTER
Caller: ANIKA LAOD    Relationship: Father    Best call back number: [phone]     What is the best time to reach you: ANYTIME    Who are you requesting to speak with (clinical staff, provider,  specific staff member): CLINICAL    Do you know the name of the person who called: SANTOS    What was the call regarding: RICHY HAS SEND A PACKET TO THE OFFICE ABOUT HIS SONS FMLA. HE WOULD LIKE TO MAKE SURE THIS WAS RECEIVED BUT THE PROVIDER.    Is it okay if the provider responds through MyChart: NO

## 2024-02-26 ENCOUNTER — TELEPHONE (OUTPATIENT)
Dept: SLEEP MEDICINE | Facility: CLINIC | Age: 34
End: 2024-02-26
Payer: COMMERCIAL

## 2024-02-28 ENCOUNTER — TELEPHONE (OUTPATIENT)
Dept: INTERNAL MEDICINE | Facility: CLINIC | Age: 34
End: 2024-02-28

## 2024-02-28 ENCOUNTER — OFFICE VISIT (OUTPATIENT)
Dept: INTERNAL MEDICINE | Facility: CLINIC | Age: 34
End: 2024-02-28
Payer: COMMERCIAL

## 2024-02-28 VITALS
RESPIRATION RATE: 18 BRPM | SYSTOLIC BLOOD PRESSURE: 136 MMHG | DIASTOLIC BLOOD PRESSURE: 94 MMHG | TEMPERATURE: 97.3 F | BODY MASS INDEX: 26.24 KG/M2 | WEIGHT: 193.5 LBS | HEART RATE: 88 BPM

## 2024-02-28 DIAGNOSIS — F10.10 ALCOHOL ABUSE: ICD-10-CM

## 2024-02-28 DIAGNOSIS — F41.0 PANIC ATTACKS: ICD-10-CM

## 2024-02-28 DIAGNOSIS — F41.9 ANXIETY: Primary | ICD-10-CM

## 2024-02-28 DIAGNOSIS — Z92.89 S/P ALCOHOL DETOXIFICATION: Primary | ICD-10-CM

## 2024-02-28 DIAGNOSIS — F41.9 ANXIETY: ICD-10-CM

## 2024-02-28 DIAGNOSIS — F10.20 ALCOHOL USE DISORDER, SEVERE, DEPENDENCE: ICD-10-CM

## 2024-02-28 RX ORDER — CLONAZEPAM 2 MG/1
TABLET ORAL
Qty: 30 TABLET | Refills: 0 | Status: SHIPPED | OUTPATIENT
Start: 2024-02-28

## 2024-02-28 RX ORDER — CLONAZEPAM 2 MG/1
TABLET ORAL
Qty: 30 TABLET | Refills: 0 | Status: SHIPPED | OUTPATIENT
Start: 2024-02-28 | End: 2024-02-28 | Stop reason: SDUPTHER

## 2024-02-28 NOTE — TELEPHONE ENCOUNTER
Can you please call [facility] in [place] and cancel the Klonopin order that was sent recently.    I have resent it to [facility] in [facility]

## 2024-02-28 NOTE — TELEPHONE ENCOUNTER
Psychiatry referral-patient will need both medication and counseling.  I meant to put that on the referral note

## 2024-02-28 NOTE — TELEPHONE ENCOUNTER
Note added to referral that he needs both medication and counseling    Sending to clinic staff to address medication call

## 2024-03-06 NOTE — PROGRESS NOTES
Mateo Byrd Patient Status:  Inpatient    1966 MRN [id number]   formerly [facility] Attending Rene Walker MD   Date 3/6/2024 PCP ELIEL KINGSLEY DO     PREOPERATIVE DIAGNOSIS/INDICATION: Biliary obstruction  POSTOPERTATIVE DIAGNOSIS: Same  PROCEDURE PERFORMED: ERCP with biliary sphincterotomy and bile duct stent placement  TIME OUT WAS PERFORMED    SEDATION: MAC sedation provided by General Anesthesia    INFORMED CONSENT: Risks, benefits and alternatives to the procedure were explained to the patient including but not limited to bleeding, infection, perforation, adverse drug reactions, pancreatitis and the need for hospitalization and surgery if this occurs, the patient understands and agrees to procedure.  PROCEDURE DESCRIPTION: The therapeutic side viewing duodenoscope was introduced into the patient’s mouth, hypo pharynx, esophagus, stomach and the first and second portion of the duodenum, straightening of the endoscope was performed to obtain a direct view of the major ampulla. Careful but limited examination of the above described areas was performed on withdrawal of the endoscope. The patient tolerated the procedure well and there were no immediate complications noted during the procedure, the patient was transported to the recovery area in stable condition.  FINDINGS:  DUODENUM: Normal  MAJOR AMPULLA: Normal  ERP: Not attempted  ERC: The CBD showed a mid ro proximal 1.5 cm stricture with upstream dilatation of the CHD, the confluence of the right and left hepatic ducts and the intrahepatics appear wnl, the cystic duct and the gallbladder were not visualized  THERAPEUTICS: The CBD was cannulated using a standard 0.035 Alexis scientific  Hydratome RX44, 20mm cut wire, deep cannulation was performed with the help of a 0.035 straight Acrobat wire 260cm in length, an adequate biliary sphincterotomy was performed with standard ERBE settings, there were no immediate  Transitional Care Follow Up Visit  Subjective     Tr Renato Brito is a 33 y.o. male who presents for a transitional care management visit.    1. Substance abuse.    He was drinking a significant amount of alcohol. He was experiencing decreased hunger, was lethargic, and did not feel well. He has been experiencing this for approximately several months. He went to the [facility] ER in [place]; however, everything was normal there other than his alcohol levels were elevated. He was in the ICU for approximately a few days. It has been approximately 2 weeks since he discontinued drinking alcohol, and he feels better. He is currently experiencing occasional anxiety and panic attacks; however, he feels better physically. He has regular bowel movements and denies constipation.     2. Anxiety.    He occasionally has trouble with anxiety and has panic attacks. He will be seeing a therapist approxiately 3 times a week. The Klonopin was helping; however, it stopped working. He is unsure if he has gained a tolerance to the dose. He has had to take 3 daily because it is not working for him. He has a stressful job.      Within 48 business hours after discharge our office contacted him via telephone to coordinate his care and needs.      I reviewed and discussed the details of that call along with the discharge summary, hospital problems, inpatient lab results, inpatient diagnostic studies, and consultation reports with Tr.     Current outpatient and discharge medications have been reconciled for the patient.  Reviewed by: Lisa Haji MA          2/20/2024     7:29 PM   Date of TCM Phone Call   [facility]   Date of Admission 2/18/2024   Date of Discharge 2/20/2024   Discharge Disposition Home or Self Care     Risk for Readmission (LACE) Score: 7 (2/20/2024  6:00 AM)      History of Present Illness   Course During Hospital Stay:           Review of Systems    Objective   Physical Exam  Constitutional:        Comments: Mildly anxious.   HENT:      Head: Normocephalic and atraumatic.      Mouth/Throat:      Mouth: Mucous membranes are moist.   Eyes:      Extraocular Movements: Extraocular movements intact.      Pupils: Pupils are equal, round, and reactive to light.   Cardiovascular:      Heart sounds: Normal heart sounds, S1 normal and S2 normal. No murmur heard.     No gallop.   Pulmonary:      Breath sounds: Normal breath sounds. No wheezing or rhonchi.   Musculoskeletal:      Cervical back: Neck supple.   Lymphadenopathy:      Cervical: No cervical adenopathy.     Assessment & Plan   Diagnoses and all orders for this visit:    1. S/P alcohol detoxification (Primary)    2. Alcohol abuse    3. Anxiety  -     Ambulatory Referral to Psychiatry  -     clonazePAM (KlonoPIN) 2 MG tablet; TAKE 1 TABLET BY MOUTH 2 TIMES DAILY  Dispense: 30 tablet; Refill: 0    4. Alcohol use disorder, severe, dependence    5. Panic attacks  -     Ambulatory Referral to Psychiatry  -     clonazePAM (KlonoPIN) 2 MG tablet; TAKE 1 TABLET BY MOUTH 2 TIMES DAILY  Dispense: 30 tablet; Refill: 0        1. Anxiety.  - Extensive discussion here today regarding his anxiety with review of his previous medications, pharmacogenetics.   - The patient has been tried on multiple SSRIs and has been having complications with side effects with these medications including sexual side effects and no response to his overall mental health.   - The patient denies any suicidal ideations or emotional liability, threats towards himself or anybody else.   - Due to recent events, especially with alcohol intoxication and abuse, the patient says that it was just a one time and has not been complications previously.   - My concern is that it is a cry for help with dealing with his anxiety, so I am going to refer him to a psychiatrist for medication management.   - I am going to increase his Klonopin to 2 mg. He takes it twice daily to see if this helps curb some of his  complication noted. We placed a biliary Cotton-Garcia plastic stent size 10 fr x 7 cm successfully; at the end of the procedure the proximal end was located at the CHD and the distal end in the duodenum.  RECOMMENDATIONS:   Post ERCP precautions, watch for bleeding, infection, perforation, adverse drug reactions and pancreatitis.  CMP, CBC in AM.  Call status report post procedure.    Gene Galarza MD  3/6/2024  8:34 AM     anxiety.    2. Substance abuse.   - I have encouraged him to enroll in substance abuse counseling, which he has been provided with several resources here in the community and the patient has agreed to go ahead and contact those.   - In my history of taking care of him over the past approximately 4 to 5 years, there has not been any complications of my concerns with abuse of alcohol or substance abuse or concerns with not being compliant on his medications.   - I also spent 10 minutes filling out FMLA papers for the patient as well to help him with his job during this time of counseling and rehabilitation recovery.        Follow Up   The patient will follow up in approximately 2 weeks to see how he is doing.    Chief Complaint   Patient presents with    Hospital Follow Up Visit            Transcribed from ambient dictation for Jorje Hurtado MD by Shira Garcia.   02/28/24   10:19 EST    Patient or patient representative verbalized consent to the visit recording.  I have personally performed the services described in this document as transcribed by the above individual, and it is both accurate and complete.

## 2024-03-12 ENCOUNTER — HOSPITAL ENCOUNTER (EMERGENCY)
Facility: HOSPITAL | Age: 34
Discharge: HOME | End: 2024-03-13
Attending: EMERGENCY MEDICINE | Admitting: EMERGENCY MEDICINE
Payer: COMMERCIAL

## 2024-03-12 ENCOUNTER — TELEPHONE (OUTPATIENT)
Dept: INTERNAL MEDICINE | Facility: CLINIC | Age: 34
End: 2024-03-12

## 2024-03-12 DIAGNOSIS — F10.11 HISTORY OF ALCOHOL ABUSE: ICD-10-CM

## 2024-03-12 DIAGNOSIS — K31.84 GASTROPARESIS: ICD-10-CM

## 2024-03-12 DIAGNOSIS — R11.2 NAUSEA AND VOMITING, UNSPECIFIED VOMITING TYPE: Primary | ICD-10-CM

## 2024-03-12 LAB
ALBUMIN SERPL-MCNC: 4.9 G/DL (ref 3.5–5.2)
ALBUMIN/GLOB SERPL: 1.6 G/DL
ALP SERPL-CCNC: 99 U/L (ref 39–117)
ALT SERPL W P-5'-P-CCNC: 40 U/L (ref 1–41)
ANION GAP SERPL CALCULATED.3IONS-SCNC: 12 MMOL/L (ref 5–15)
AST SERPL-CCNC: 114 U/L (ref 1–40)
BASOPHILS # BLD AUTO: 0.03 10*3/MM3 (ref 0–0.2)
BASOPHILS NFR BLD AUTO: 0.6 % (ref 0–1.5)
BILIRUB SERPL-MCNC: 0.4 MG/DL (ref 0–1.2)
BUN SERPL-MCNC: 5 MG/DL (ref 6–20)
BUN/CREAT SERPL: 5.8 (ref 7–25)
CALCIUM SPEC-SCNC: 9.2 MG/DL (ref 8.6–10.5)
CHLORIDE SERPL-SCNC: 95 MMOL/L (ref 98–107)
CO2 SERPL-SCNC: 26 MMOL/L (ref 22–29)
CREAT SERPL-MCNC: 0.86 MG/DL (ref 0.76–1.27)
DEPRECATED RDW RBC AUTO: 44.5 FL (ref 37–54)
EGFRCR SERPLBLD CKD-EPI 2021: 118.7 ML/MIN/1.73
EOSINOPHIL # BLD AUTO: 0 10*3/MM3 (ref 0–0.4)
EOSINOPHIL NFR BLD AUTO: 0 % (ref 0.3–6.2)
ERYTHROCYTE [DISTWIDTH] IN BLOOD BY AUTOMATED COUNT: 12.6 % (ref 12.3–15.4)
ETHANOL BLD-MCNC: 41 MG/DL (ref 0–10)
GLOBULIN UR ELPH-MCNC: 3.1 GM/DL
GLUCOSE SERPL-MCNC: 112 MG/DL (ref 65–99)
HCT VFR BLD AUTO: 45.3 % (ref 37.5–51)
HGB BLD-MCNC: 15.2 G/DL (ref 13–17.7)
IMM GRANULOCYTES # BLD AUTO: 0.01 10*3/MM3 (ref 0–0.05)
IMM GRANULOCYTES NFR BLD AUTO: 0.2 % (ref 0–0.5)
LIPASE SERPL-CCNC: 29 U/L (ref 13–60)
LYMPHOCYTES # BLD AUTO: 1.21 10*3/MM3 (ref 0.7–3.1)
LYMPHOCYTES NFR BLD AUTO: 22.6 % (ref 19.6–45.3)
MCH RBC QN AUTO: 31.9 PG (ref 26.6–33)
MCHC RBC AUTO-ENTMCNC: 33.6 G/DL (ref 31.5–35.7)
MCV RBC AUTO: 95.2 FL (ref 79–97)
MONOCYTES # BLD AUTO: 0.25 10*3/MM3 (ref 0.1–0.9)
MONOCYTES NFR BLD AUTO: 4.7 % (ref 5–12)
NEUTROPHILS NFR BLD AUTO: 3.86 10*3/MM3 (ref 1.7–7)
NEUTROPHILS NFR BLD AUTO: 71.9 % (ref 42.7–76)
NRBC BLD AUTO-RTO: 0 /100 WBC (ref 0–0.2)
PLATELET # BLD AUTO: 404 10*3/MM3 (ref 140–450)
PMV BLD AUTO: 9.5 FL (ref 6–12)
POTASSIUM SERPL-SCNC: 4.2 MMOL/L (ref 3.5–5.2)
PROT SERPL-MCNC: 8 G/DL (ref 6–8.5)
RBC # BLD AUTO: 4.76 10*6/MM3 (ref 4.14–5.8)
SODIUM SERPL-SCNC: 133 MMOL/L (ref 136–145)
WBC NRBC COR # BLD AUTO: 5.36 10*3/MM3 (ref 3.4–10.8)

## 2024-03-12 PROCEDURE — 96374 THER/PROPH/DIAG INJ IV PUSH: CPT

## 2024-03-12 PROCEDURE — 25810000003 SODIUM CHLORIDE 0.9 % SOLUTION: Performed by: EMERGENCY MEDICINE

## 2024-03-12 PROCEDURE — 80053 COMPREHEN METABOLIC PANEL: CPT | Performed by: EMERGENCY MEDICINE

## 2024-03-12 PROCEDURE — 96375 TX/PRO/DX INJ NEW DRUG ADDON: CPT

## 2024-03-12 PROCEDURE — 85025 COMPLETE CBC W/AUTO DIFF WBC: CPT | Performed by: EMERGENCY MEDICINE

## 2024-03-12 PROCEDURE — 83690 ASSAY OF LIPASE: CPT | Performed by: EMERGENCY MEDICINE

## 2024-03-12 PROCEDURE — 99283 EMERGENCY DEPT VISIT LOW MDM: CPT

## 2024-03-12 PROCEDURE — 82077 ASSAY SPEC XCP UR&BREATH IA: CPT | Performed by: EMERGENCY MEDICINE

## 2024-03-12 PROCEDURE — 25010000002 DIAZEPAM PER 5 MG: Performed by: EMERGENCY MEDICINE

## 2024-03-12 PROCEDURE — 25010000002 ONDANSETRON PER 1 MG: Performed by: EMERGENCY MEDICINE

## 2024-03-12 PROCEDURE — 25010000002 DROPERIDOL PER 5 MG: Performed by: EMERGENCY MEDICINE

## 2024-03-12 RX ORDER — DIAZEPAM 5 MG/ML
2.5 INJECTION, SOLUTION INTRAMUSCULAR; INTRAVENOUS ONCE
Status: COMPLETED | OUTPATIENT
Start: 2024-03-12 | End: 2024-03-12

## 2024-03-12 RX ORDER — ONDANSETRON 2 MG/ML
4 INJECTION INTRAMUSCULAR; INTRAVENOUS
Status: DISCONTINUED | OUTPATIENT
Start: 2024-03-12 | End: 2024-03-13 | Stop reason: HOSPADM

## 2024-03-12 RX ORDER — ONDANSETRON 2 MG/ML
4 INJECTION INTRAMUSCULAR; INTRAVENOUS
Status: DISCONTINUED | OUTPATIENT
Start: 2024-03-12 | End: 2024-03-12

## 2024-03-12 RX ORDER — SODIUM CHLORIDE 0.9 % (FLUSH) 0.9 %
10 SYRINGE (ML) INJECTION AS NEEDED
Status: DISCONTINUED | OUTPATIENT
Start: 2024-03-12 | End: 2024-03-13 | Stop reason: HOSPADM

## 2024-03-12 RX ORDER — DROPERIDOL 2.5 MG/ML
2.5 INJECTION, SOLUTION INTRAMUSCULAR; INTRAVENOUS ONCE
Status: COMPLETED | OUTPATIENT
Start: 2024-03-12 | End: 2024-03-12

## 2024-03-12 RX ADMIN — SODIUM CHLORIDE 1000 ML: 9 INJECTION, SOLUTION INTRAVENOUS at 22:35

## 2024-03-12 RX ADMIN — DIAZEPAM 2.5 MG: 10 INJECTION, SOLUTION INTRAMUSCULAR; INTRAVENOUS at 23:01

## 2024-03-12 RX ADMIN — DROPERIDOL 2.5 MG: 2.5 INJECTION, SOLUTION INTRAMUSCULAR; INTRAVENOUS at 23:15

## 2024-03-12 RX ADMIN — ONDANSETRON 4 MG: 2 INJECTION INTRAMUSCULAR; INTRAVENOUS at 22:35

## 2024-03-12 NOTE — TELEPHONE ENCOUNTER
Provider: DR COCHRAN    Caller: NATHALY LAO    Relationship to Patient: WILLIAM    Phone Number: [phone]     Reason for Call: THE PATIENT CALLED IN TO MAKE SURE THAT HIS PCP WILL READ THE PREVIOUS MESSAGE ABOUT HIS MEDICATION..

## 2024-03-13 ENCOUNTER — HOSPITAL ENCOUNTER (EMERGENCY)
Facility: HOSPITAL | Age: 34
Discharge: HOME | End: 2024-03-13
Attending: STUDENT IN AN ORGANIZED HEALTH CARE EDUCATION/TRAINING PROGRAM | Admitting: STUDENT IN AN ORGANIZED HEALTH CARE EDUCATION/TRAINING PROGRAM
Payer: COMMERCIAL

## 2024-03-13 ENCOUNTER — TELEPHONE (OUTPATIENT)
Dept: INTERNAL MEDICINE | Facility: CLINIC | Age: 34
End: 2024-03-13
Payer: COMMERCIAL

## 2024-03-13 VITALS
RESPIRATION RATE: 20 BRPM | WEIGHT: 193 LBS | SYSTOLIC BLOOD PRESSURE: 142 MMHG | OXYGEN SATURATION: 98 % | TEMPERATURE: 97.8 F | BODY MASS INDEX: 26.14 KG/M2 | DIASTOLIC BLOOD PRESSURE: 99 MMHG | HEIGHT: 72 IN | HEART RATE: 89 BPM

## 2024-03-13 VITALS
RESPIRATION RATE: 18 BRPM | HEART RATE: 91 BPM | DIASTOLIC BLOOD PRESSURE: 84 MMHG | OXYGEN SATURATION: 96 % | SYSTOLIC BLOOD PRESSURE: 121 MMHG | BODY MASS INDEX: 26.14 KG/M2 | TEMPERATURE: 98.2 F | HEIGHT: 72 IN | WEIGHT: 193 LBS

## 2024-03-13 DIAGNOSIS — F41.9 ANXIETY: Primary | ICD-10-CM

## 2024-03-13 DIAGNOSIS — F13.930 BENZODIAZEPINE WITHDRAWAL WITHOUT COMPLICATION: Primary | ICD-10-CM

## 2024-03-13 LAB
ALBUMIN SERPL-MCNC: 4.6 G/DL (ref 3.5–5.2)
ALBUMIN/GLOB SERPL: 1.5 G/DL
ALP SERPL-CCNC: 97 U/L (ref 39–117)
ALT SERPL W P-5'-P-CCNC: 34 U/L (ref 1–41)
ANION GAP SERPL CALCULATED.3IONS-SCNC: 11.2 MMOL/L (ref 5–15)
APAP SERPL-MCNC: <5 MCG/ML (ref 0–30)
AST SERPL-CCNC: 79 U/L (ref 1–40)
BASOPHILS # BLD AUTO: 0.02 10*3/MM3 (ref 0–0.2)
BASOPHILS NFR BLD AUTO: 0.3 % (ref 0–1.5)
BILIRUB SERPL-MCNC: 0.4 MG/DL (ref 0–1.2)
BUN SERPL-MCNC: 4 MG/DL (ref 6–20)
BUN/CREAT SERPL: 4.9 (ref 7–25)
CALCIUM SPEC-SCNC: 8.7 MG/DL (ref 8.6–10.5)
CHLORIDE SERPL-SCNC: 100 MMOL/L (ref 98–107)
CO2 SERPL-SCNC: 23.8 MMOL/L (ref 22–29)
CREAT SERPL-MCNC: 0.81 MG/DL (ref 0.76–1.27)
DEPRECATED RDW RBC AUTO: 43.9 FL (ref 37–54)
EGFRCR SERPLBLD CKD-EPI 2021: 120.1 ML/MIN/1.73
EOSINOPHIL # BLD AUTO: 0.15 10*3/MM3 (ref 0–0.4)
EOSINOPHIL NFR BLD AUTO: 2.6 % (ref 0.3–6.2)
ERYTHROCYTE [DISTWIDTH] IN BLOOD BY AUTOMATED COUNT: 12.7 % (ref 12.3–15.4)
ETHANOL BLD-MCNC: <10 MG/DL (ref 0–10)
ETHANOL UR QL: <0.01 %
GLOBULIN UR ELPH-MCNC: 3.1 GM/DL
GLUCOSE SERPL-MCNC: 119 MG/DL (ref 65–99)
HCT VFR BLD AUTO: 42.4 % (ref 37.5–51)
HGB BLD-MCNC: 14.6 G/DL (ref 13–17.7)
IMM GRANULOCYTES # BLD AUTO: 0.01 10*3/MM3 (ref 0–0.05)
IMM GRANULOCYTES NFR BLD AUTO: 0.2 % (ref 0–0.5)
LIPASE SERPL-CCNC: 40 U/L (ref 13–60)
LYMPHOCYTES # BLD AUTO: 1.29 10*3/MM3 (ref 0.7–3.1)
LYMPHOCYTES NFR BLD AUTO: 22.4 % (ref 19.6–45.3)
MCH RBC QN AUTO: 32.2 PG (ref 26.6–33)
MCHC RBC AUTO-ENTMCNC: 34.4 G/DL (ref 31.5–35.7)
MCV RBC AUTO: 93.6 FL (ref 79–97)
MONOCYTES # BLD AUTO: 0.35 10*3/MM3 (ref 0.1–0.9)
MONOCYTES NFR BLD AUTO: 6.1 % (ref 5–12)
NEUTROPHILS NFR BLD AUTO: 3.95 10*3/MM3 (ref 1.7–7)
NEUTROPHILS NFR BLD AUTO: 68.4 % (ref 42.7–76)
NRBC BLD AUTO-RTO: 0 /100 WBC (ref 0–0.2)
PLATELET # BLD AUTO: 358 10*3/MM3 (ref 140–450)
PMV BLD AUTO: 9.4 FL (ref 6–12)
POTASSIUM SERPL-SCNC: 3.5 MMOL/L (ref 3.5–5.2)
PROT SERPL-MCNC: 7.7 G/DL (ref 6–8.5)
RBC # BLD AUTO: 4.53 10*6/MM3 (ref 4.14–5.8)
SALICYLATES SERPL-MCNC: <0.3 MG/DL
SODIUM SERPL-SCNC: 135 MMOL/L (ref 136–145)
WBC NRBC COR # BLD AUTO: 5.77 10*3/MM3 (ref 3.4–10.8)

## 2024-03-13 PROCEDURE — 82077 ASSAY SPEC XCP UR&BREATH IA: CPT

## 2024-03-13 PROCEDURE — 25010000002 DIAZEPAM PER 5 MG: Performed by: STUDENT IN AN ORGANIZED HEALTH CARE EDUCATION/TRAINING PROGRAM

## 2024-03-13 PROCEDURE — 85025 COMPLETE CBC W/AUTO DIFF WBC: CPT

## 2024-03-13 PROCEDURE — 80053 COMPREHEN METABOLIC PANEL: CPT

## 2024-03-13 PROCEDURE — 83690 ASSAY OF LIPASE: CPT

## 2024-03-13 PROCEDURE — 96375 TX/PRO/DX INJ NEW DRUG ADDON: CPT

## 2024-03-13 PROCEDURE — 80143 DRUG ASSAY ACETAMINOPHEN: CPT

## 2024-03-13 PROCEDURE — 80179 DRUG ASSAY SALICYLATE: CPT

## 2024-03-13 PROCEDURE — 96374 THER/PROPH/DIAG INJ IV PUSH: CPT

## 2024-03-13 PROCEDURE — 25010000002 DROPERIDOL PER 5 MG

## 2024-03-13 PROCEDURE — 99283 EMERGENCY DEPT VISIT LOW MDM: CPT

## 2024-03-13 PROCEDURE — 25010000002 DIPHENHYDRAMINE PER 50 MG

## 2024-03-13 PROCEDURE — 93005 ELECTROCARDIOGRAM TRACING: CPT

## 2024-03-13 PROCEDURE — 25010000002 ONDANSETRON PER 1 MG

## 2024-03-13 PROCEDURE — 25810000003 SODIUM CHLORIDE 0.9 % SOLUTION

## 2024-03-13 RX ORDER — CHLORDIAZEPOXIDE HYDROCHLORIDE 25 MG/1
CAPSULE, GELATIN COATED ORAL
Qty: 15 CAPSULE | Refills: 0 | Status: SHIPPED | OUTPATIENT
Start: 2024-03-13 | End: 2024-03-13

## 2024-03-13 RX ORDER — DROPERIDOL 2.5 MG/ML
2.5 INJECTION, SOLUTION INTRAMUSCULAR; INTRAVENOUS ONCE
Status: COMPLETED | OUTPATIENT
Start: 2024-03-13 | End: 2024-03-13

## 2024-03-13 RX ORDER — ONDANSETRON 2 MG/ML
4 INJECTION INTRAMUSCULAR; INTRAVENOUS ONCE
Status: COMPLETED | OUTPATIENT
Start: 2024-03-13 | End: 2024-03-13

## 2024-03-13 RX ORDER — ONDANSETRON 4 MG/1
4 TABLET, FILM COATED ORAL EVERY 8 HOURS PRN
Qty: 21 TABLET | Refills: 0 | Status: SHIPPED | OUTPATIENT
Start: 2024-03-13 | End: 2024-03-20

## 2024-03-13 RX ORDER — DICYCLOMINE HYDROCHLORIDE 10 MG/1
10 CAPSULE ORAL 3 TIMES DAILY PRN
Qty: 15 CAPSULE | Refills: 0 | Status: SHIPPED | OUTPATIENT
Start: 2024-03-13

## 2024-03-13 RX ORDER — FAMOTIDINE 20 MG/1
20 TABLET, FILM COATED ORAL 2 TIMES DAILY
Qty: 14 TABLET | Refills: 0 | Status: SHIPPED | OUTPATIENT
Start: 2024-03-13 | End: 2024-03-20

## 2024-03-13 RX ORDER — DIAZEPAM 5 MG/ML
10 INJECTION, SOLUTION INTRAMUSCULAR; INTRAVENOUS ONCE
Status: COMPLETED | OUTPATIENT
Start: 2024-03-13 | End: 2024-03-13

## 2024-03-13 RX ORDER — SODIUM CHLORIDE 0.9 % (FLUSH) 0.9 %
10 SYRINGE (ML) INJECTION AS NEEDED
Status: DISCONTINUED | OUTPATIENT
Start: 2024-03-13 | End: 2024-03-13 | Stop reason: HOSPADM

## 2024-03-13 RX ORDER — LORAZEPAM 2 MG/ML
1 INJECTION INTRAMUSCULAR ONCE
Status: DISCONTINUED | OUTPATIENT
Start: 2024-03-13 | End: 2024-03-13

## 2024-03-13 RX ORDER — DIPHENHYDRAMINE HYDROCHLORIDE 50 MG/ML
25 INJECTION INTRAMUSCULAR; INTRAVENOUS ONCE
Status: COMPLETED | OUTPATIENT
Start: 2024-03-13 | End: 2024-03-13

## 2024-03-13 RX ORDER — CHLORDIAZEPOXIDE HYDROCHLORIDE 25 MG/1
50 CAPSULE, GELATIN COATED ORAL ONCE
Status: COMPLETED | OUTPATIENT
Start: 2024-03-13 | End: 2024-03-13

## 2024-03-13 RX ORDER — CLONAZEPAM 1 MG/1
TABLET ORAL
Qty: 60 TABLET | Refills: 0 | Status: SHIPPED | OUTPATIENT
Start: 2024-03-13 | End: 2024-03-13

## 2024-03-13 RX ORDER — ONDANSETRON 4 MG/1
4 TABLET, ORALLY DISINTEGRATING ORAL 4 TIMES DAILY PRN
Qty: 15 TABLET | Refills: 0 | Status: SHIPPED | OUTPATIENT
Start: 2024-03-13

## 2024-03-13 RX ADMIN — DROPERIDOL 2.5 MG: 2.5 INJECTION, SOLUTION INTRAMUSCULAR; INTRAVENOUS at 10:21

## 2024-03-13 RX ADMIN — SODIUM CHLORIDE 1000 ML: 9 INJECTION, SOLUTION INTRAVENOUS at 09:06

## 2024-03-13 RX ADMIN — CHLORDIAZEPOXIDE HYDROCHLORIDE 50 MG: 25 CAPSULE ORAL at 09:07

## 2024-03-13 RX ADMIN — DIAZEPAM 10 MG: 5 INJECTION, SOLUTION INTRAMUSCULAR; INTRAVENOUS at 09:06

## 2024-03-13 RX ADMIN — DIPHENHYDRAMINE HYDROCHLORIDE 25 MG: 50 INJECTION INTRAMUSCULAR; INTRAVENOUS at 10:22

## 2024-03-13 RX ADMIN — ONDANSETRON 4 MG: 2 INJECTION INTRAMUSCULAR; INTRAVENOUS at 09:06

## 2024-03-13 NOTE — TELEPHONE ENCOUNTER
Patient's dad, Heber Daryl, called. He is very concerned about his son, who was in detox center a month ago. Patient has been in ER twice in the last 24 hours. He has no appetite and is nauseated. He is just lying in his bed not talking to anyone. I advised Dad that he was not listed on the  verbal consent so we are not able to give him any information. Dad was advised to encourage the patient to call our office or call the ambulance if they are concerned about his well being.  Patient's phone: [phone]

## 2024-03-13 NOTE — ED PROVIDER NOTES
[place]    EMERGENCY DEPARTMENT ENCOUNTER      Pt Name: Tr Brito  MRN: [id number]  YOB: 1990  Date of evaluation: 3/12/2024  Provider: Tr Hallman DO    CHIEF COMPLAINT       Chief Complaint   Patient presents with    Nausea     HPI  Stated Reason for Visit: Pt c/o nausea and vomiting. Reports detoxing a month ago from alcohol and has been vomiting since. Symptoms began worsening today. History Obtained From: patient     HISTORY OF PRESENT ILLNESS  (Location/Symptom, Timing/Onset, Context/Setting, Quality, Duration, Modifying Factors, Severity.)   Tr Brito is a 34 y.o. male who presents to the emergency department for evaluation with his significant other with a concern for nausea and vomiting, epigastric discomfort.  He is undergoing alcohol detoxification just over a month ago and has had some intermittent nausea and vomiting, dry heaving.  He also notes pretty significant anxiety at baseline.  Is been following with his PCP, was on oral benzodiazepine, run out few days ago and has not been taking his anxiolytic medications.  He denies any fever, chills, has required GI evaluation with prior hospitalizations and stomach pain.  Has not had any hematemesis.  He denies any fever, chills.  He was previously on Zofran, has also ran out of this.  He has not had any recent travel or known sick contacts, no diarrheal illness.  The patient denies any other acute systemic complaints at this time.  He has not had any alcohol since he with recent detoxification about 4 weeks ago.      Nursing notes were reviewed.      PAST MEDICAL HISTORY     Past Medical History:   Diagnosis Date    Anxiety     Colitis     Depression     Erectile dysfunction     History of alcohol use disorder     Hypertension          SURGICAL HISTORY       Past Surgical History:   Procedure Laterality Date    ENDOSCOPY N/A 01/22/2023    Procedure: ESOPHAGOGASTRODUODENOSCOPY WITH BIOPSY;  Surgeon: Ayleen  Az LUTZ MD;  Location: [facility] END[facility]COPY;  Service: Gastroenterology;  Laterality: N/A;    NOSE SURGERY      fracture    WISDOM TOOTH EXTRACTION           CURRENT MEDICATIONS       Current Facility-Administered Medications:     ondansetron (ZOFRAN) injection 4 mg, 4 mg, Intravenous, Q30 Min PRN, Tr Hallman DO, 4 mg at 03/12/24 8673    [COMPLETED] Insert Peripheral IV, , , Once **AND** sodium chloride 0.9 % flush 10 mL, 10 mL, Intravenous, PRN, Tr Hallman DO    Current Outpatient Medications:     clonazePAM (KlonoPIN) 2 MG tablet, TAKE 1 TABLET BY MOUTH 2 TIMES DAILY, Disp: 30 tablet, Rfl: 0    dicyclomine (BENTYL) 10 MG capsule, Take 1 capsule by mouth 3 (Three) Times a Day As Needed (abdominal cramping)., Disp: 15 capsule, Rfl: 0    famotidine (PEPCID) 20 MG tablet, Take 1 tablet by mouth 2 (Two) Times a Day for 7 days., Disp: 14 tablet, Rfl: 0    ondansetron ODT (ZOFRAN-ODT) 4 MG disintegrating tablet, Place 1 tablet on the tongue 4 (Four) Times a Day As Needed for Nausea or Vomiting., Disp: 15 tablet, Rfl: 0    ondansetron ODT (ZOFRAN-ODT) 8 MG disintegrating tablet, Place 1 tablet on the tongue Every 8 (Eight) Hours As Needed for Nausea or Vomiting., Disp: , Rfl:     ALLERGIES     Patient has no known allergies.    FAMILY HISTORY       Family History   Problem Relation Age of Onset    Sleep apnea Mother     ADD / ADHD Mother     Anemia Mother     Diabetes Mother     Hypertension Mother     Mental illness Mother     Obesity Mother     Diabetes Father     Hypertension Father     Hyperlipidemia Father     Arthritis Maternal Grandmother     Heart attack Paternal Grandmother           SOCIAL HISTORY       Social History     Socioeconomic History    Marital status: Single   Tobacco Use    Smoking status: Never    Smokeless tobacco: Never   Vaping Use    Vaping status: Some Days    Substances: Nicotine   Substance and Sexual Activity    Alcohol use: Yes     Alcohol/week: 10.0 standard drinks of  alcohol     Types: 10 Cans of beer per week    Drug use: No    Sexual activity: Yes         PHYSICAL EXAM    (up to 7 for level 4, 8 or more for level 5)     Vitals:    03/12/24 2328 03/12/24 2330 03/13/24 0000 03/13/24 0001   BP:  125/87 113/79    BP Location:       Patient Position:       Pulse:  88  87   Resp:       Temp:       TempSrc:       SpO2: 95%   92%   Weight:       Height:           Physical Exam  General : Patient is awake, alert, oriented, in no acute distress, nontoxic appearing  HEENT: Pupils are equally round, EOMI, conjunctivae clear, there is no injection no icterus.   Neck: Neck is supple, full range of motion, trachea midline  Cardiac: Heart regular rate, rhythm, no murmurs, rubs, or gallops  Lungs: Lungs are clear to auscultation, there is no wheezing, rhonchi, or rales. There is no use of accessory muscles  Abdomen: Abdomen is soft, nontender, nondistended.  No peritoneal signs on examination there are no firm or pulsatile masses, no rebound rigidity or guarding  Musculoskeletal: 5 out of 5 strength in all 4 extremities.  No focal muscle deficits are appreciated  Neuro: Motor intact, sensory intact, level of consciousness is normal, GCS 15  Dermatology: Skin is warm and dry  Psych: Mentation is grossly normal, cognition is grossly normal. Affect is appropriate      DIAGNOSTIC RESULTS     EKG:  All EKGs are interpreted by the Emergency Department Physician who either signs or Co-signs this chart in the absence of a cardiologist.    No orders to display         ED BEDSIDE ULTRASOUND:   Performed by ED Physician - none    LABS:    I have reviewed and interpreted all of the currently available lab results from this visit (if applicable):  Results for orders placed or performed during the hospital encounter of 03/12/24   Comprehensive Metabolic Panel    Specimen: Blood   Result Value Ref Range    Glucose 112 (H) 65 - 99 mg/dL    BUN 5 (L) 6 - 20 mg/dL    Creatinine 0.86 0.76 - 1.27 mg/dL    Sodium  133 (L) 136 - 145 mmol/L    Potassium 4.2 3.5 - 5.2 mmol/L    Chloride 95 (L) 98 - 107 mmol/L    CO2 26.0 22.0 - 29.0 mmol/L    Calcium 9.2 8.6 - 10.5 mg/dL    Total Protein 8.0 6.0 - 8.5 g/dL    Albumin 4.9 3.5 - 5.2 g/dL    ALT (SGPT) 40 1 - 41 U/L    AST (SGOT) 114 (H) 1 - 40 U/L    Alkaline Phosphatase 99 39 - 117 U/L    Total Bilirubin 0.4 0.0 - 1.2 mg/dL    Globulin 3.1 gm/dL    A/G Ratio 1.6 g/dL    BUN/Creatinine Ratio 5.8 (L) 7.0 - 25.0    Anion Gap 12.0 5.0 - 15.0 mmol/L    eGFR 118.7 >60.0 mL/min/1.73   Lipase    Specimen: Blood   Result Value Ref Range    Lipase 29 13 - 60 U/L   Ethanol    Specimen: Blood   Result Value Ref Range    Ethanol 41 (H) 0 - 10 mg/dL   CBC Auto Differential    Specimen: Blood   Result Value Ref Range    WBC 5.36 3.40 - 10.80 10*3/mm3    RBC 4.76 4.14 - 5.80 10*6/mm3    Hemoglobin 15.2 13.0 - 17.7 g/dL    Hematocrit 45.3 37.5 - 51.0 %    MCV 95.2 79.0 - 97.0 fL    MCH 31.9 26.6 - 33.0 pg    MCHC 33.6 31.5 - 35.7 g/dL    RDW 12.6 12.3 - 15.4 %    RDW-SD 44.5 37.0 - 54.0 fl    MPV 9.5 6.0 - 12.0 fL    Platelets 404 140 - 450 10*3/mm3    Neutrophil % 71.9 42.7 - 76.0 %    Lymphocyte % 22.6 19.6 - 45.3 %    Monocyte % 4.7 (L) 5.0 - 12.0 %    Eosinophil % 0.0 (L) 0.3 - 6.2 %    Basophil % 0.6 0.0 - 1.5 %    Immature Grans % 0.2 0.0 - 0.5 %    Neutrophils, Absolute 3.86 1.70 - 7.00 10*3/mm3    Lymphocytes, Absolute 1.21 0.70 - 3.10 10*3/mm3    Monocytes, Absolute 0.25 0.10 - 0.90 10*3/mm3    Eosinophils, Absolute 0.00 0.00 - 0.40 10*3/mm3    Basophils, Absolute 0.03 0.00 - 0.20 10*3/mm3    Immature Grans, Absolute 0.01 0.00 - 0.05 10*3/mm3    nRBC 0.0 0.0 - 0.2 /100 WBC        If labs were ordered, I independently reviewed the results and considered them in treating the patient.      EMERGENCY DEPARTMENT COURSE and DIFFERENTIAL DIAGNOSIS/MDM:   Vitals:  AS OF 00:31 EDT    BP - 113/79  HR - 87  TEMP - 98.2 °F (36.8 °C) (Oral)  O2 SATS - 92%      Orders placed during this visit:  Orders  Placed This Encounter   Procedures    Comprehensive Metabolic Panel    Lipase    Ethanol    CBC Auto Differential    Insert Peripheral IV    CBC & Differential       All labs have been independently reviewed by me.  All radiology studies have been reviewed by me and the radiologist dictating the report.  All EKG's have been independently viewed and interpreted by me.      Discussion below represents my analysis of pertinent findings related to patient's condition, differential diagnosis, treatment plan and final disposition.    Differential diagnosis:  The differential diagnosis associated with the patient's presentation includes: Gastritis, esophagitis, persistent nausea, vomiting, pancreatitis    Additional sources  Discussed/ obtained information from independent historians:   [x] Spouse  [] Parent  [] Family member  [] Friend  [] EMS   [] Other:    External (non-ED) record review:   [] Inpatient record:   [] Office record:   [] Outpatient record:   [] Prior Outpatient labs:   [] Prior Outpatient radiology:   [] Primary Care record:   [] Outside ED record:   [] Other:     Patient's care impacted by:   [] Diabetes  [] Hypertension  [] CHF  [] Hyperlipidemia  [] Coronary Artery Disease   [] COPD   [] Cancer   [] Tobacco Abuse   [] Substance Abuse    [] Other:     Care significantly affected by Social Determinants of Health (housing and economic circumstances, unemployment)    [] Yes     [x] No   If yes, Patient's care significantly limited by Social Determinants of Health including:   [] Inadequate housing   [] Low income   [] Alcoholism and drug addiction in family   [] Problems related to primary support group   [] Unemployment   [] Problems related to employment   [] Other Social Determinants of Health:       MEDICATIONS ADMINISTERED IN ED:  Medications   sodium chloride 0.9 % flush 10 mL (has no administration in time range)   ondansetron (ZOFRAN) injection 4 mg (4 mg Intravenous Given 3/12/24 0182)   diazePAM  (VALIUM) injection 2.5 mg (2.5 mg Intravenous Given 3/12/24 2301)   sodium chloride 0.9 % bolus 1,000 mL (0 mL Intravenous Stopped 3/12/24 2347)   droperidol (INAPSINE) injection 2.5 mg (2.5 mg Intravenous Given 3/12/24 2315)              33-year-old male who was had history of alcohol abuse, has undergone medical detoxification over a month ago and has had some intermittent but pretty frequent nausea and vomiting episodes.  Is nontoxic-appearing, does not have any peritoneal signs examination.  Vital signs are stable on my initial assessment.  Heart is slightly elevated, he has been off of his anxiolytic medication, Xanax over the last few days as well, likely exacerbating his underlying symptoms.  He is fully awake and alert answer questions appropriately, no significant abnormalities on physical examination.  We discussed obtaining IV, labs, having the patient treated symptomatically.  Given dose of IV Valium given his chronic benzodiazepine usage.  Labs reviewed with ethyl level 41, no signs of underlying pancreatitis, lipase is 29.  His white count is 5 with a hemoglobin of 15 kidney function electrolytes are stable, no significant maladies of his LFTs.  He did have an episode of dry heaving, emesis while in the ED, was given a dose of droperidol which significantly helped with his underlying symptoms.  On reassessment patient resting comfortably.  We discussed the need to maintain a healthy lifestyle, alcohol cessation.  He has: With his PCP for to have a refill of his benzodiazepines.  Likely underlying gastritis, gastroparesis which we will continue with symptomatic treatments, good fluid hydration, bowel rest, follow-up with his PCP and GI specialist which she has seen previously.  Return to the ED with any worsening symptoms or further concerns in the meantime.    I had a discussion with the patient/family regarding diagnosis, diagnostic results, treatment plan, and medications.  The patient/family  indicated understanding of these instructions.  I spent adequate time at the bedside preceding discharge necessary to personally discuss the aftercare instructions, giving patient education, providing explanations of the results of our evaluations/findings, and my decision making to assure that the patient/family understand the plan of care.  Time was allotted to answer questions at that time and throughout the ED course.  Emphasis was placed on timely follow-up after discharge.  I also discussed the potential for the development of an acute emergent condition requiring further evaluation, admission, or even surgical intervention. I discussed that we found nothing during the visit today indicating the need for further workup, admission, or the presence of an unstable medical condition.  I encouraged the patient to return to the emergency department immediately for ANY concerns, worsening, new complaints, or if symptoms persist and unable to seek follow-up in a timely fashion.  The patient/family expressed understanding and agreement with this plan.  The patient will follow-up with their PCP in 1-2 days for reevaluation.     PROCEDURES:  Procedures    CRITICAL CARE TIME    Total Critical Care time was 0 minutes, excluding separately reportable procedures.   There was a high probability of clinically significant/life threatening deterioration in the patient's condition which required my urgent intervention.      FINAL IMPRESSION      1. Nausea and vomiting, unspecified vomiting type    2. Gastroparesis    3. History of alcohol abuse          DISPOSITION/PLAN     ED Disposition       ED Disposition   Discharge    Condition   Stable    Comment   --               PATIENT REFERRED TO:  No follow-up provider specified.    DISCHARGE MEDICATIONS:     Medication List        START taking these medications      dicyclomine 10 MG capsule  Commonly known as: BENTYL  Take 1 capsule by mouth 3 (Three) Times a Day As Needed  (abdominal cramping).     famotidine 20 MG tablet  Commonly known as: PEPCID  Take 1 tablet by mouth 2 (Two) Times a Day for 7 days.            CHANGE how you take these medications      * ondansetron ODT 4 MG disintegrating tablet  Commonly known as: ZOFRAN-ODT  Place 1 tablet on the tongue 4 (Four) Times a Day As Needed for Nausea or Vomiting.  What changed: You were already taking a medication with the same name, and this prescription was added. Make sure you understand how and when to take each.     * ondansetron ODT 8 MG disintegrating tablet  Commonly known as: ZOFRAN-ODT  What changed: Another medication with the same name was added. Make sure you understand how and when to take each.           * This list has 2 medication(s) that are the same as other medications prescribed for you. Read the directions carefully, and ask your doctor or other care provider to review them with you.                ASK your doctor about these medications      clonazePAM 2 MG tablet  Commonly known as: KlonoPIN  TAKE 1 TABLET BY MOUTH 2 TIMES DAILY               Where to Get Your Medications        These medications were sent to [id number] [address] [facility] [phone] [facility] [phone]   [address] [place] [id number]      Phone: [phone]   dicyclomine 10 MG capsule  famotidine 20 MG tablet  ondansetron ODT 4 MG disintegrating tablet             Comment: Please note this report has been produced using speech recognition software.      Tr Hallman DO  Attending Emergency Physician         Tr Hallman DO  03/13/24 0031

## 2024-03-13 NOTE — DISCHARGE INSTRUCTIONS
Please follow-up with your primary care doctor within 48 hours.  Return to the ER for any acute changes or worsening of your condition.  Do not drive or machinery while taking Librium medication.

## 2024-03-13 NOTE — ED PROVIDER NOTES
EMERGENCY DEPARTMENT ENCOUNTER    Pt Name: Tr Brito  MRN: [id number]  Pt :   1990  Room Number:    Date of encounter:  3/13/2024  PCP: Jorje Hurtado MD  ED Provider: Valeriy Hart PA-C    Historian: Patient      HPI:  Chief Complaint: Nausea vomiting        Context: Tr Brito is a 34 y.o. male who presents to the ED c/o nausea vomiting for the past 2 days.  Patient also reports associated symptoms of tremors.  Patient states he is several days from his last dose of his clonazepam prescription which he ran out of early due to overuse.  Patient states he was admitted for alcohol withdrawal approximately 1 month ago he has not had no alcohol since then but was taking twice daily clonazepam.  Patient states he was taking more than his normal prescribed dose and hence ran out several days early.  It has been over 48 hours since his last dose of clonazepam.  Patient was seen at [facility] emergency department yesterday.      PAST MEDICAL HISTORY  Past Medical History:   Diagnosis Date    Anxiety     Colitis     Depression     Erectile dysfunction     History of alcohol use disorder     Hypertension          PAST SURGICAL HISTORY  Past Surgical History:   Procedure Laterality Date    ENDOSCOPY N/A 2023    Procedure: ESOPHAGOGASTRODUODENOSCOPY WITH BIOPSY;  Surgeon: Az Abbasi MD;  Location: [facility] ENDOSCOPY;  Service: Gastroenterology;  Laterality: N/A;    NOSE SURGERY      fracture    WISDOM TOOTH EXTRACTION           FAMILY HISTORY  Family History   Problem Relation Age of Onset    Sleep apnea Mother     ADD / ADHD Mother     Anemia Mother     Diabetes Mother     Hypertension Mother     Mental illness Mother     Obesity Mother     Diabetes Father     Hypertension Father     Hyperlipidemia Father     Arthritis Maternal Grandmother     Heart attack Paternal Grandmother          SOCIAL HISTORY  Social History     Socioeconomic History    Marital status: Single    Tobacco Use    Smoking status: Never    Smokeless tobacco: Never   Vaping Use    Vaping status: Some Days    Substances: Nicotine   Substance and Sexual Activity    Alcohol use: Yes     Alcohol/week: 10.0 standard drinks of alcohol     Types: 10 Cans of beer per week    Drug use: No    Sexual activity: Yes         ALLERGIES  Patient has no known allergies.        REVIEW OF SYSTEMS  Review of Systems   Constitutional:  Negative for chills and fever.   HENT:  Negative for congestion and sore throat.    Respiratory:  Negative for cough and shortness of breath.    Cardiovascular:  Negative for chest pain.   Gastrointestinal:  Positive for nausea and vomiting. Negative for abdominal pain.   Genitourinary:  Negative for dysuria.   Musculoskeletal:  Negative for back pain.   Skin:  Negative for wound.   Neurological:  Positive for tremors. Negative for dizziness, seizures, syncope and headaches.   Psychiatric/Behavioral:  Negative for confusion.    All other systems reviewed and are negative.     All systems reviewed and negative except for those discussed in HPI.       PHYSICAL EXAM    I have reviewed the triage vital signs and nursing notes.    ED Triage Vitals [03/13/24 0756]   Temp Heart Rate Resp BP SpO2   97.8 °F (36.6 °C) 109 20 (!) 141/103 99 %      Temp src Heart Rate Source Patient Position BP Location FiO2 (%)   -- -- -- -- --       Physical Exam  Vitals and nursing note reviewed.   Constitutional:       General: He is not in acute distress.     Appearance: Normal appearance. He is not ill-appearing, toxic-appearing or diaphoretic.   HENT:      Head: Normocephalic and atraumatic.      Right Ear: External ear normal.      Left Ear: External ear normal.      Nose: No congestion or rhinorrhea.      Mouth/Throat:      Mouth: Mucous membranes are moist.      Pharynx: Oropharynx is clear.   Eyes:      Conjunctiva/sclera: Conjunctivae normal.   Cardiovascular:      Rate and Rhythm: Normal rate.      Heart sounds:  Normal heart sounds.   Pulmonary:      Effort: Pulmonary effort is normal. No respiratory distress.      Breath sounds: Normal breath sounds. No wheezing.   Abdominal:      Tenderness: There is no abdominal tenderness.   Musculoskeletal:      Cervical back: Normal range of motion and neck supple.      Right lower leg: No edema.      Left lower leg: No edema.   Skin:     Findings: No rash.   Neurological:      General: No focal deficit present.      Mental Status: He is alert and oriented to person, place, and time.   Psychiatric:         Attention and Perception: Attention and perception normal.         Mood and Affect: Mood and affect normal.         Speech: Speech normal.         Behavior: Behavior normal. Behavior is cooperative.         Thought Content: Thought content normal. Thought content does not include homicidal or suicidal ideation. Thought content does not include homicidal or suicidal plan.         Cognition and Memory: Cognition and memory normal.       GENERAL:   Appears in no acute distress.   HENT: Nares patent.  EYES: No scleral icterus.  CV: Regular rhythm, regular rate.  RESPIRATORY: Normal effort.  No audible wheezes, rales or rhonchi.  ABDOMEN: Soft, nontender  MUSCULOSKELETAL: No deformities.   NEURO: Alert, moves all extremities, follows commands.  SKIN: Warm, dry, no rash visualized.      LAB RESULTS  Recent Results (from the past 24 hour(s))   Comprehensive Metabolic Panel    Collection Time: 03/12/24 10:30 PM    Specimen: Blood   Result Value Ref Range    Glucose 112 (H) 65 - 99 mg/dL    BUN 5 (L) 6 - 20 mg/dL    Creatinine 0.86 0.76 - 1.27 mg/dL    Sodium 133 (L) 136 - 145 mmol/L    Potassium 4.2 3.5 - 5.2 mmol/L    Chloride 95 (L) 98 - 107 mmol/L    CO2 26.0 22.0 - 29.0 mmol/L    Calcium 9.2 8.6 - 10.5 mg/dL    Total Protein 8.0 6.0 - 8.5 g/dL    Albumin 4.9 3.5 - 5.2 g/dL    ALT (SGPT) 40 1 - 41 U/L    AST (SGOT) 114 (H) 1 - 40 U/L    Alkaline Phosphatase 99 39 - 117 U/L    Total  Bilirubin 0.4 0.0 - 1.2 mg/dL    Globulin 3.1 gm/dL    A/G Ratio 1.6 g/dL    BUN/Creatinine Ratio 5.8 (L) 7.0 - 25.0    Anion Gap 12.0 5.0 - 15.0 mmol/L    eGFR 118.7 >60.0 mL/min/1.73   Lipase    Collection Time: 03/12/24 10:30 PM    Specimen: Blood   Result Value Ref Range    Lipase 29 13 - 60 U/L   Ethanol    Collection Time: 03/12/24 10:30 PM    Specimen: Blood   Result Value Ref Range    Ethanol 41 (H) 0 - 10 mg/dL   CBC Auto Differential    Collection Time: 03/12/24 10:30 PM    Specimen: Blood   Result Value Ref Range    WBC 5.36 3.40 - 10.80 10*3/mm3    RBC 4.76 4.14 - 5.80 10*6/mm3    Hemoglobin 15.2 13.0 - 17.7 g/dL    Hematocrit 45.3 37.5 - 51.0 %    MCV 95.2 79.0 - 97.0 fL    MCH 31.9 26.6 - 33.0 pg    MCHC 33.6 31.5 - 35.7 g/dL    RDW 12.6 12.3 - 15.4 %    RDW-SD 44.5 37.0 - 54.0 fl    MPV 9.5 6.0 - 12.0 fL    Platelets 404 140 - 450 10*3/mm3    Neutrophil % 71.9 42.7 - 76.0 %    Lymphocyte % 22.6 19.6 - 45.3 %    Monocyte % 4.7 (L) 5.0 - 12.0 %    Eosinophil % 0.0 (L) 0.3 - 6.2 %    Basophil % 0.6 0.0 - 1.5 %    Immature Grans % 0.2 0.0 - 0.5 %    Neutrophils, Absolute 3.86 1.70 - 7.00 10*3/mm3    Lymphocytes, Absolute 1.21 0.70 - 3.10 10*3/mm3    Monocytes, Absolute 0.25 0.10 - 0.90 10*3/mm3    Eosinophils, Absolute 0.00 0.00 - 0.40 10*3/mm3    Basophils, Absolute 0.03 0.00 - 0.20 10*3/mm3    Immature Grans, Absolute 0.01 0.00 - 0.05 10*3/mm3    nRBC 0.0 0.0 - 0.2 /100 WBC   Comprehensive Metabolic Panel    Collection Time: 03/13/24  9:08 AM    Specimen: Blood   Result Value Ref Range    Glucose 119 (H) 65 - 99 mg/dL    BUN 4 (L) 6 - 20 mg/dL    Creatinine 0.81 0.76 - 1.27 mg/dL    Sodium 135 (L) 136 - 145 mmol/L    Potassium 3.5 3.5 - 5.2 mmol/L    Chloride 100 98 - 107 mmol/L    CO2 23.8 22.0 - 29.0 mmol/L    Calcium 8.7 8.6 - 10.5 mg/dL    Total Protein 7.7 6.0 - 8.5 g/dL    Albumin 4.6 3.5 - 5.2 g/dL    ALT (SGPT) 34 1 - 41 U/L    AST (SGOT) 79 (H) 1 - 40 U/L    Alkaline Phosphatase 97 39 - 117 U/L     Total Bilirubin 0.4 0.0 - 1.2 mg/dL    Globulin 3.1 gm/dL    A/G Ratio 1.5 g/dL    BUN/Creatinine Ratio 4.9 (L) 7.0 - 25.0    Anion Gap 11.2 5.0 - 15.0 mmol/L    eGFR 120.1 >60.0 mL/min/1.73   Lipase    Collection Time: 03/13/24  9:08 AM    Specimen: Blood   Result Value Ref Range    Lipase 40 13 - 60 U/L   Ethanol    Collection Time: 03/13/24  9:08 AM    Specimen: Blood   Result Value Ref Range    Ethanol <10 0 - 10 mg/dL    Ethanol % <0.010 %   Salicylate Level    Collection Time: 03/13/24  9:08 AM    Specimen: Blood   Result Value Ref Range    Salicylate <0.3 <=30.0 mg/dL   Acetaminophen Level    Collection Time: 03/13/24  9:08 AM    Specimen: Blood   Result Value Ref Range    Acetaminophen <5.0 0.0 - 30.0 mcg/mL   CBC Auto Differential    Collection Time: 03/13/24  9:08 AM    Specimen: Blood   Result Value Ref Range    WBC 5.77 3.40 - 10.80 10*3/mm3    RBC 4.53 4.14 - 5.80 10*6/mm3    Hemoglobin 14.6 13.0 - 17.7 g/dL    Hematocrit 42.4 37.5 - 51.0 %    MCV 93.6 79.0 - 97.0 fL    MCH 32.2 26.6 - 33.0 pg    MCHC 34.4 31.5 - 35.7 g/dL    RDW 12.7 12.3 - 15.4 %    RDW-SD 43.9 37.0 - 54.0 fl    MPV 9.4 6.0 - 12.0 fL    Platelets 358 140 - 450 10*3/mm3    Neutrophil % 68.4 42.7 - 76.0 %    Lymphocyte % 22.4 19.6 - 45.3 %    Monocyte % 6.1 5.0 - 12.0 %    Eosinophil % 2.6 0.3 - 6.2 %    Basophil % 0.3 0.0 - 1.5 %    Immature Grans % 0.2 0.0 - 0.5 %    Neutrophils, Absolute 3.95 1.70 - 7.00 10*3/mm3    Lymphocytes, Absolute 1.29 0.70 - 3.10 10*3/mm3    Monocytes, Absolute 0.35 0.10 - 0.90 10*3/mm3    Eosinophils, Absolute 0.15 0.00 - 0.40 10*3/mm3    Basophils, Absolute 0.02 0.00 - 0.20 10*3/mm3    Immature Grans, Absolute 0.01 0.00 - 0.05 10*3/mm3    nRBC 0.0 0.0 - 0.2 /100 WBC       If labs were ordered, I independently reviewed the results and considered them in treating the patient.        RADIOLOGY  No Radiology Exams Resulted Within Past 24 Hours          PROCEDURES    Procedures    ECG 12 Lead QT Measurement    Final Result          MEDICATIONS GIVEN IN ER    Medications   sodium chloride 0.9 % flush 10 mL (has no administration in time range)   droperidol (INAPSINE) injection 2.5 mg (has no administration in time range)   diphenhydrAMINE (BENADRYL) injection 25 mg (has no administration in time range)   sodium chloride 0.9 % bolus 1,000 mL (1,000 mL Intravenous New Bag 3/13/24 0906)   ondansetron (ZOFRAN) injection 4 mg (4 mg Intravenous Given 3/13/24 0906)   chlordiazePOXIDE (LIBRIUM) capsule 50 mg (50 mg Oral Given 3/13/24 0907)   diazePAM (VALIUM) injection 10 mg (10 mg Intravenous Given 3/13/24 0906)         MEDICAL DECISION MAKING, PROGRESS, and CONSULTS    All labs, if obtained, have been independently reviewed by me.  All radiology studies, if obtained, have been reviewed by me and the radiologist dictating the report.  All EKG's, if obtained, have been independently viewed and interpreted by me/my attending physician.      Discussion below represents my analysis of pertinent findings related to patient's condition, differential diagnosis, treatment plan and final disposition.    Patient was mildly tremulous and actively vomiting during my physical exam although he was upright alert and oriented x 3.  Mild elevated pressure but vital signs stable other than some tachycardia.  Given his history of benzodiazepine withdrawal in the last several days and long-term daily use prior to that, IV Valium and oral Librium was ordered in the ED along with extensive workup.  CBC showed no leukocytosis.  CMP was nonactionable although notable for an AST of 79.  Lipase within normal limits ethanol undetectable.  Acetaminophen and salicylate were unremarkable.  On reevaluation patient is feeling somewhat better after Valium and Librium medication, he is no longer tremulous and is still alert oriented upright and communicating and behaving normally capable of making his own healthcare decisions.     I did offer the patient a  behavioral health assessment for potential admission to a detox facility but patient refused stating he was not interested in admission anywhere at this time.  He was still complaining of nausea droperidol and Benadryl was then given IV.  Patient was discharged home in stable condition with a prescription for a Librium taper.  I discussed with EDMD Dr. Mark prior to discharge who agreed with this plan of care.  Patient will follow-up with his primary care within 48 hours.  Work note was provided along strict ED return precautions of which she verbalized understanding of and agreement with.                     Differential diagnosis:    Differential diagnosis included was limited to alcohol withdrawal, benzodiazepine withdrawal, viral illness, among other      Additional sources:    - Discussed/ obtained information from independent historians: Spouse at bedside    - External (non-ED) record review: Behavioral health psychiatric records    - Chronic or social conditions impacting care: History of alcoholism      Orders placed during this visit:  Orders Placed This Encounter   Procedures    Comprehensive Metabolic Panel    Lipase    Ethanol    Urinalysis With Microscopic If Indicated (No Culture) - Urine, Clean Catch    Urine Drug Screen - Urine, Clean Catch    Salicylate Level    Acetaminophen Level    CBC Auto Differential    Monitor Blood Pressure    ECG 12 Lead QT Measurement    Insert Peripheral IV    Seizure Precautions    CBC & Differential         Additional orders considered but not ordered:  None    ED Course:    Consultants:      ED Course as of 03/13/24 1016   Wed Mar 13, 2024   1001 EKG interpreted by me, normal sinus rhythm with no concerning ST changes noted, rate of 84 [JE]      ED Course User Index  [JE] Alon Mark MD              Shared Decision Making:  After my consideration of clinical presentation and any laboratory/radiology studies obtained, I discussed the findings with the  patient/patient representative who is in agreement with the treatment plan and the final disposition.   Risks and benefits of discharge and/or observation/admission were discussed.       AS OF 10:16 EDT VITALS:    BP - 142/99  HR - 89  TEMP - 97.8 °F (36.6 °C)  O2 SATS - 98%                  DIAGNOSIS  Final diagnoses:   Benzodiazepine withdrawal without complication         DISPOSITION  Discharge home      Please note that portions of this document were completed with voice recognition software.        Valeriy Hart PA-C  03/13/24 1016

## 2024-03-13 NOTE — Clinical Note
[facility] EMERGENCY DEPARTMENT  [address] [address]  Phone: [phone]    Tr Brito was seen and treated in our emergency department on 3/13/2024.  He may return to work on 03/16/2024.         Thank you for choosing [facility].    Valeriy Hart PA-C

## 2024-04-05 ENCOUNTER — TELEPHONE (OUTPATIENT)
Dept: INTERNAL MEDICINE | Facility: CLINIC | Age: 34
End: 2024-04-05
Payer: COMMERCIAL

## 2024-04-05 DIAGNOSIS — R11.2 NAUSEA AND VOMITING, UNSPECIFIED VOMITING TYPE: Primary | ICD-10-CM

## 2024-04-05 RX ORDER — ONDANSETRON 8 MG/1
8 TABLET, ORALLY DISINTEGRATING ORAL EVERY 8 HOURS PRN
Qty: 30 TABLET | Refills: 0 | Status: SHIPPED | OUTPATIENT
Start: 2024-04-05

## 2024-04-05 NOTE — TELEPHONE ENCOUNTER
Caller: Tr Brito    Relationship: Self    Best call back number: [phone]    What medication are you requesting: ZOFRAN    What are your current symptoms: VOMITING AND DIARRHEA    How long have you been experiencing symptoms: 1 DAY    Have you had these symptoms before:    [x] Yes  [] No    Have you been treated for these symptoms before:   [x] Yes  [] No    If a prescription is needed, what is your preferred pharmacy and phone number:      [facility] [id number] - [place], [place] - [facility] [address] AT [facility] [phone] [facility] [phone] FX     Additional notes:

## 2024-04-05 NOTE — TELEPHONE ENCOUNTER
Left voice mail message for Pt to call back    Relay   please verify Pt symptoms, how long they have been present

## 2024-04-05 NOTE — TELEPHONE ENCOUNTER
Qtc reviewed from 3/13/24, wnl.    Zofran prescribed. Patient should seek care if fevers, bloody diarrhea, severe abdominal pain, or unable to tolerate oral intake.    Dr. Montero

## 2024-04-10 DIAGNOSIS — F41.9 ANXIETY: ICD-10-CM

## 2024-04-10 DIAGNOSIS — F41.0 PANIC ATTACKS: ICD-10-CM

## 2024-04-10 RX ORDER — CLONAZEPAM 2 MG/1
TABLET ORAL
Qty: 60 TABLET | Refills: 2 | Status: SHIPPED | OUTPATIENT
Start: 2024-04-10

## 2024-04-10 NOTE — TELEPHONE ENCOUNTER
Last office visit (LOV) for chronic conditions 02/28/24  Next office visit (NOV) 05/30/24  Urine drug screen (UDS) 02/28/24  Controlled substance agreement (CSA) 02/28/24

## 2024-04-10 NOTE — TELEPHONE ENCOUNTER
.Caller: Tr Brito    Relationship: Self    Best call back number: [phone]     Requested Prescriptions:   Requested Prescriptions     Pending Prescriptions Disp Refills    clonazePAM (KlonoPIN) 2 MG tablet 30 tablet 0     Sig: TAKE 1 TABLET BY MOUTH 2 TIMES DAILY        Pharmacy where request should be sent: [facility] [id number] [address] AT [facility] [phone] [facility] [phone] FX     Last office visit with prescribing clinician: 2/28/2024   Last telemedicine visit with prescribing clinician: Visit date not found   Next office visit with prescribing clinician: 5/30/2024     Additional details provided by patient: PATIENT HAS 2 DAYS.    Does the patient have less than a 3 day supply:  [x] Yes  [] No    Would you like a call back once the refill request has been completed: [] Yes [x] No    If the office needs to give you a call back, can they leave a voicemail: [] Yes [x] No    Les Samuel Rep   04/10/24 11:01 EDT

## 2024-04-29 DIAGNOSIS — R11.2 NAUSEA AND VOMITING, UNSPECIFIED VOMITING TYPE: ICD-10-CM

## 2024-04-29 RX ORDER — ONDANSETRON 8 MG/1
8 TABLET, ORALLY DISINTEGRATING ORAL EVERY 8 HOURS PRN
Qty: 30 TABLET | Refills: 1 | Status: SHIPPED | OUTPATIENT
Start: 2024-04-29

## 2024-04-29 NOTE — TELEPHONE ENCOUNTER
Caller: Tr Brito    Relationship: Self    Best call back number: [phone]     Requested Prescriptions:   Requested Prescriptions     Pending Prescriptions Disp Refills    ondansetron ODT (ZOFRAN-ODT) 8 MG disintegrating tablet 30 tablet 0     Sig: Place 1 tablet on the tongue Every 8 (Eight) Hours As Needed for Nausea or Vomiting.        Pharmacy where request should be sent: [facility] [id number] [place] [address] [address] [facility] [phone] [facility] [phone] FX     Last office visit with prescribing clinician: 2/28/2024   Last telemedicine visit with prescribing clinician: Visit date not found   Next office visit with prescribing clinician: 5/30/2024     Additional details provided by patient: PATIENT IS OUT.    Does the patient have less than a 3 day supply:  [x] Yes  [] No    Would you like a call back once the refill request has been completed: [] Yes [x] No    If the office needs to give you a call back, can they leave a voicemail: [] Yes [x] No    Cadance Dunaway, RegSched Rep   04/29/24 11:41 EDT

## 2024-05-30 ENCOUNTER — OFFICE VISIT (OUTPATIENT)
Dept: INTERNAL MEDICINE | Facility: CLINIC | Age: 34
End: 2024-05-30
Payer: COMMERCIAL

## 2024-05-30 VITALS
HEART RATE: 92 BPM | RESPIRATION RATE: 16 BRPM | WEIGHT: 170.13 LBS | BODY MASS INDEX: 23.04 KG/M2 | DIASTOLIC BLOOD PRESSURE: 90 MMHG | HEIGHT: 72 IN | TEMPERATURE: 98.2 F | SYSTOLIC BLOOD PRESSURE: 134 MMHG

## 2024-05-30 DIAGNOSIS — F41.9 ANXIETY: ICD-10-CM

## 2024-05-30 DIAGNOSIS — F41.0 PANIC ATTACKS: ICD-10-CM

## 2024-05-30 DIAGNOSIS — J30.2 SEASONAL ALLERGIES: ICD-10-CM

## 2024-05-30 DIAGNOSIS — R03.0 ELEVATED BLOOD PRESSURE READING: Primary | ICD-10-CM

## 2024-05-30 DIAGNOSIS — Z13.220 LIPID SCREENING: ICD-10-CM

## 2024-05-30 DIAGNOSIS — M79.602 LEFT ARM PAIN: ICD-10-CM

## 2024-05-30 LAB
CHOLEST SERPL-MCNC: 225 MG/DL (ref 0–200)
HDLC SERPL-MCNC: 62 MG/DL (ref 40–60)
LDLC SERPL CALC-MCNC: 139 MG/DL (ref 0–100)
LDLC/HDLC SERPL: 2.19 {RATIO}
TRIGL SERPL-MCNC: 136 MG/DL (ref 0–150)
VLDLC SERPL-MCNC: 24 MG/DL (ref 5–40)

## 2024-05-30 PROCEDURE — 80061 LIPID PANEL: CPT | Performed by: INTERNAL MEDICINE

## 2024-05-30 PROCEDURE — 99395 PREV VISIT EST AGE 18-39: CPT | Performed by: INTERNAL MEDICINE

## 2024-05-30 NOTE — PROGRESS NOTES
"Chief Complaint    Complete Adult Physical  History of Present Illness  The patient is a 34-year-old male who presents for evaluation of multiple medical concerns.    The patient has been diligently monitoring his blood pressure at home, which has consistently shown slightly elevated readings during medical visits, ranging from 145/95 to 150/90. Despite owning a blood pressure cuff, he reports facial flushing.    The patient has a history of seasonal allergies, which have been exacerbated recently. Despite attempts at self-medication with over-the-counter medications such as Claritin, Zyrtec, and Allegra, there has been no improvement in his symptoms. He is considering consulting an allergist.    The patient has previously responded positively to Zoloft, which improved his sleep and overall well-being. However, he discontinued its use due to concerns about sexual side effects.    The patient continues to experience issues with his left arm, which impedes his ability to play golf. He reports wrist pain, muscle pain, shoulder pain, and daily neck pain. He is left-handed and frequently engages in work interviews, which necessitates writing. The pain subsides within 30 minutes. He finds relief from his morning dose of clonazepam.   Both of his parents have hypertension. His maternal grandmother had a minor heart attack.         Diet: regular       Exercise: minimal to none             Vital Signs:   /90 (BP Location: Right arm, Patient Position: Sitting, Cuff Size: Adult)   Pulse 92   Temp 98.2 °F (36.8 °C) (Temporal)   Resp 16   Ht 183.5 cm (72.24\")   Wt 77.2 kg (170 lb 2 oz)   BMI 22.92 kg/m²       Body mass index is 22.92 kg/m².      Social History No ETOH, no smoking , no drugs      Preventative Screenings  Results           Immunizations:up to date     Assessment & Plan         Patient or patient representative verbalized consent for the use of Ambient Listening during the visit with  Jorje Hurtado MD " "for chart documentation. 5/30/2024  10:14 EDT         Review of Systems    Objective   Body mass index is 22.92 kg/m².  BMI is within normal parameters. No other follow-up for BMI required.       Vital Signs:   /90 (BP Location: Right arm, Patient Position: Sitting, Cuff Size: Adult)   Pulse 92   Temp 98.2 °F (36.8 °C) (Temporal)   Resp 16   Ht 183.5 cm (72.24\")   Wt 77.2 kg (170 lb 2 oz)   BMI 22.92 kg/m²       Physical Exam  Patient is alert x3, non-distressed.  Head is normocephalic and atraumatic. Pupils are equal to light and accommodation. Extraocular muscles are intact. Moist membranes.  Neck is supple with no cervical lymphadenopathy or goiter.  Chest is clear to auscultation with no rhonchi or wheeze.  Heart examination reveals S1, S2 with no murmurs, rubs, or gallops.  Positive bowel sounds, soft, no masses or tenderness.  No testicular mass and no inguinal hernia detected.  Peripheral vascular examination reveals +2 pulses, warm extremity, and good perfusion. Strength is plus 5 out of 5 in both upper and lower proximal distributions.  Cranial nerves are intact, +2 DTRs.       Results              Assessment and Plan  Diagnoses and all orders for this visit:    Diagnoses and all orders for this visit:    1. Elevated blood pressure reading (Primary)    2. Seasonal allergies  -     Ambulatory Referral to Allergy    3. Anxiety  -     sertraline (Zoloft) 50 MG tablet; Take 1 tablet by mouth Daily.  Dispense: 90 tablet; Refill: 2    4. Panic attacks  -     sertraline (Zoloft) 50 MG tablet; Take 1 tablet by mouth Daily.  Dispense: 90 tablet; Refill: 2    5. Left arm pain    6. Lipid screening        Assessment & Plan  1. Elevated blood pressure readings.  Given the patient's familial predisposition, which is a significant risk factor, a blood pressure log sheet will be provided to the patient to monitor his blood pressure readings over the ensuing 7 to 10 days.    2. Moderate to severe seasonal " allergies.  These conditions are exacerbated by the use of conventional over-the-counter medications and environmental improvements. A referral to an allergist will be made for a more comprehensive evaluation and recommendations for his seasonal allergies. In the interim, the patient will maintain his current allergy regimen.    3. Anxiety and panic attacks.  The patient has responded positively to Zoloft, expressing a desire to trial this medication due to concerns about sexual side effects. The patient will be reinstated on the lower dose of 50 mg, to be taken orally once daily, in an attempt to alleviate his anxiety and panic attacks.    4. Arm pain.- recommend trial of physical therapy to assess arm functionality                Follow Up   Return in about 1 year (around 5/30/2025) for Annual.  Patient was given instructions and counseling regarding his condition or for health maintenance advice. Please see specific information pulled into the AVS if appropriate.     Patient or patient representative verbalized consent for the use of Ambient Listening during the visit with  Jorje Hurtado MD for chart documentation. 5/30/2024  10:14 EDT

## 2024-06-13 DIAGNOSIS — R11.2 NAUSEA AND VOMITING, UNSPECIFIED VOMITING TYPE: ICD-10-CM

## 2024-06-13 RX ORDER — ONDANSETRON 8 MG/1
8 TABLET, ORALLY DISINTEGRATING ORAL EVERY 8 HOURS PRN
Qty: 30 TABLET | Refills: 1 | Status: SHIPPED | OUTPATIENT
Start: 2024-06-13

## 2024-06-13 NOTE — TELEPHONE ENCOUNTER
Caller: Tr Brito    Relationship: Self    Best call back number: [phone]     Requested Prescriptions:   Requested Prescriptions     Pending Prescriptions Disp Refills    ondansetron ODT (ZOFRAN-ODT) 8 MG disintegrating tablet 30 tablet 1     Sig: Place 1 tablet on the tongue Every 8 (Eight) Hours As Needed for Nausea or Vomiting.        Pharmacy where request should be sent: [facility] [id number] [address] AT [facility] [phone] [facility] [phone]      Last office visit with prescribing clinician: 5/30/2024   Last telemedicine visit with prescribing clinician: Visit date not found   Next office visit with prescribing clinician: 5/30/2025     Additional details provided by patient: VOMITING, OTHERS IN HOME SICK THINKS ITS A VIRUS     Does the patient have less than a 3 day supply:  [x] Yes  [] No    Would you like a call back once the refill request has been completed: [] Yes [x] No    If the office needs to give you a call back, can they leave a voicemail: [] Yes [x] No    Les Cm   06/13/24 12:09 EDT

## 2024-07-03 DIAGNOSIS — F41.0 PANIC ATTACKS: ICD-10-CM

## 2024-07-03 DIAGNOSIS — F41.9 ANXIETY: ICD-10-CM

## 2024-07-03 RX ORDER — CLONAZEPAM 2 MG/1
TABLET ORAL
Qty: 60 TABLET | Refills: 3 | Status: SHIPPED | OUTPATIENT
Start: 2024-07-03

## 2024-07-03 NOTE — TELEPHONE ENCOUNTER
Caller: Tr Brito    Relationship: Self  Best call back number: [phone]     Requested Prescriptions:   Requested Prescriptions     Pending Prescriptions Disp Refills    clonazePAM (KlonoPIN) 2 MG tablet 60 tablet 2     Sig: TAKE 1 TABLET BY MOUTH 2 TIMES DAILY        Pharmacy where request should be sent: [id number] - [place], [place] - [address] AT [facility] [address] - [phone]  - [phone] FX     Last office visit with prescribing clinician: 5/30/2024   Last telemedicine visit with prescribing clinician: Visit date not found   Next office visit with prescribing clinician: 5/30/2025     Additional details provided by patient: PATIENT HAS CALLED REQUESTING A NEW PRESCRIPTION ON ABOVE MEDICATION TO BE SENT TO [facility] [address] IN [place]    Does the patient have less than a 3 day supply:  [x] Yes  [] No    Would you like a call back once the refill request has been completed: [] Yes [x] No    If the office needs to give you a call back, can they leave a voicemail: [] Yes [x] No    Cathryn Davies   07/03/24 15:23 EDT

## 2024-07-10 ENCOUNTER — TELEPHONE (OUTPATIENT)
Dept: INTERNAL MEDICINE | Facility: CLINIC | Age: 34
End: 2024-07-10
Payer: COMMERCIAL

## 2024-07-10 NOTE — TELEPHONE ENCOUNTER
Caller: Tr Brito    Relationship: Self    Best call back number:[phone]    What is the best time to reach you: ANYTIME    Who are you requesting to speak with (clinical staff, provider,  specific staff member): CLINICAL STAFF    Do you know the name of the person who called: PATIENT    What was the call regarding:GOING OUT OF TOWN THE FIRST WEEK OF AUGUST AND IS REQUESTING THE POSSIBILITY OF A  LOWER DOSAGE ANXIETY MEDICATION TO TAKE IN CONJUNCTION WITH      clonazePAM (KlonoPIN) 2 MG tablet     DUE TO SOME UP AND COMING SITUATIONS THAT HE MIGHT NEED ADDITIONAL MEDICATION    Is it okay if the provider responds through MyChart:

## 2024-07-15 NOTE — TELEPHONE ENCOUNTER
"Left voice message for patient to return call.    Relay: Patient states that he is requesting for a \"low-dose \"of another medication.  Is patient currently taking the Zoloft with the clonazepam?   "

## 2024-07-18 ENCOUNTER — OFFICE VISIT (OUTPATIENT)
Dept: INTERNAL MEDICINE | Facility: CLINIC | Age: 34
End: 2024-07-18
Payer: COMMERCIAL

## 2024-07-18 VITALS
SYSTOLIC BLOOD PRESSURE: 134 MMHG | BODY MASS INDEX: 22.14 KG/M2 | HEART RATE: 88 BPM | WEIGHT: 163.25 LBS | DIASTOLIC BLOOD PRESSURE: 74 MMHG | TEMPERATURE: 98.2 F | RESPIRATION RATE: 16 BRPM

## 2024-07-18 DIAGNOSIS — F41.0 PANIC ATTACKS: ICD-10-CM

## 2024-07-18 DIAGNOSIS — F41.9 ANXIETY: Primary | ICD-10-CM

## 2024-07-18 RX ORDER — CLONAZEPAM 2 MG/1
2 TABLET ORAL 3 TIMES DAILY PRN
Qty: 90 TABLET | Refills: 0 | Status: SHIPPED | OUTPATIENT
Start: 2024-07-18

## 2024-07-18 NOTE — PROGRESS NOTES
Chief Complaint  Med Refill    Subjective    Tr Brito is a 34 y.o. male.     Tr Brito presents to [facility] for     History of Present Illness  The patient presents for evaluation of anxiety and panic attacks.    The patient is scheduled for a trip to [place] from 08/02/2023 to 08/06/2023, proposing his girlfriend. He has been grappling with anxiety during general travel. He is seeking a 5-day regimen of medication to supplement his regular medication, as he does not wish to overuse it or consume alcohol to compensate for his anxiousness.  He has heard of a friend, a physician, who suggested the possibility of lowering the dosage for a brief period to manage his anxiety. He reports that the medication is effective for his daily routine, and he is currently working. However, he acknowledges that he will have complications during the trip. He has not consumed alcohol since 02/16/2023. He expresses a dislike for planes during the trip.       The following portions of the patient's history were reviewed and updated as appropriate: allergies, current medications, past family history, past medical history, past social history, past surgical history, and problem list.    Review of Systems   All other systems reviewed and are negative.      Objective   Body mass index is 22.14 kg/m².  BMI is within normal parameters. No other follow-up for BMI required.       Vital Signs:   /74 (BP Location: Right arm, Patient Position: Sitting, Cuff Size: Adult)   Pulse 88   Temp 98.2 °F (36.8 °C) (Temporal)   Resp 16   Wt 74 kg (163 lb 4 oz)   BMI 22.14 kg/m²       Physical Exam  Patient is somewhat mildly anxious, but consoled.  Lungs are clear to auscultation. No rhonchi or wheeze.  Heart examination reveals S1, S2. No murmurs, rubs, or gallops.  Gastrointestinal examination reveals positive bowel sounds, soft, no masses, no tenderness.  Peripheral  vascular examination reveals +2 pulses, warm extremity, good perfusion.       Results              Assessment and Plan  Diagnoses and all orders for this visit:  Spent approximately 40 minutes face-to-face with patient in counseling  Assessment & Plan  1. Anxiety and panic attacks.  The patient's current regimen of Klonopin and sertraline 50 mg, taken orally once daily, has been effective in managing his symptoms. However, he exhibits situational social anxiety, which is exacerbated by the upcoming social event involving his girlfriend. This anxiety raises his concerns about potential relapse into panic attacks. The dosage of Klonopin will be increased to 2 mg, to be taken thrice daily as needed. The continuation of sertraline is advised, and it is anticipated that this will provide additional coverage for his anxiety.               Follow Up   No follow-ups on file.  Patient was given instructions and counseling regarding his condition or for health maintenance advice. Please see specific information pulled into the AVS if appropriate.     Patient or patient representative verbalized consent for the use of Ambient Listening during the visit with  Jorje Hurtado MD for chart documentation. 7/18/2024  16:43 EDT

## 2024-07-26 DIAGNOSIS — R11.2 NAUSEA AND VOMITING, UNSPECIFIED VOMITING TYPE: ICD-10-CM

## 2024-07-26 RX ORDER — ONDANSETRON 8 MG/1
8 TABLET, ORALLY DISINTEGRATING ORAL EVERY 8 HOURS PRN
Qty: 30 TABLET | Refills: 1 | Status: SHIPPED | OUTPATIENT
Start: 2024-07-26

## 2024-07-26 NOTE — TELEPHONE ENCOUNTER
Caller: Tr Brito    Relationship: Self    Best call back number: [phone]     Requested Prescriptions:   Requested Prescriptions     Pending Prescriptions Disp Refills    ondansetron ODT (ZOFRAN-ODT) 8 MG disintegrating tablet 30 tablet 1     Sig: Place 1 tablet on the tongue Every 8 (Eight) Hours As Needed for Nausea or Vomiting.        Pharmacy where request should be sent: [id number] [place] [address] AT [facility] [address] - [phone]  - [phone] FX     Last office visit with prescribing clinician: 7/18/2024   Last telemedicine visit with prescribing clinician: Visit date not found   Next office visit with prescribing clinician: 8/29/2024     Additional details provided by patient: THE PATIENT IS OUT OF MEDICATION AND WILL BE TRAVELLING SOON.    Does the patient have less than a 3 day supply:  [x] Yes  [] No    Would you like a call back once the refill request has been completed: [] Yes [] No    If the office needs to give you a call back, can they leave a voicemail: [] Yes [] No    Les Villanueva Rep   07/26/24 14:44 EDT

## 2024-08-13 ENCOUNTER — TELEPHONE (OUTPATIENT)
Dept: INTERNAL MEDICINE | Facility: CLINIC | Age: 34
End: 2024-08-13
Payer: COMMERCIAL

## 2024-08-13 DIAGNOSIS — R11.2 NAUSEA AND VOMITING, UNSPECIFIED VOMITING TYPE: ICD-10-CM

## 2024-08-13 DIAGNOSIS — H00.014 HORDEOLUM EXTERNUM OF LEFT UPPER EYELID: Primary | ICD-10-CM

## 2024-08-13 RX ORDER — ONDANSETRON 4 MG/1
4 TABLET, FILM COATED ORAL EVERY 8 HOURS PRN
Status: CANCELLED | OUTPATIENT
Start: 2024-08-13

## 2024-08-13 NOTE — TELEPHONE ENCOUNTER
Caller: Tr Brito    Relationship: Self    Best call back number: [phone]     Requested Prescriptions:   Requested Prescriptions     Pending Prescriptions Disp Refills    ondansetron (ZOFRAN) 4 MG tablet       Sig: Take 1 tablet by mouth Every 8 (Eight) Hours As Needed for Nausea or Vomiting.        Pharmacy where request should be sent: [id number] - [place], [place] - [facility] [address] AT [facility] [address] - [phone]  - [phone] FX     Last office visit with prescribing clinician: 7/18/2024   Last telemedicine visit with prescribing clinician: Visit date not found   Next office visit with prescribing clinician: 8/29/2024     Additional details provided by patient: PATIENT IS ON A 28 DAY ANTIBIOTICS FOR AN EYE INFECTION. HE'S NOT FEELING WELL AND NEEDS SOMETHING FOR HIS STOMACH.    Does the patient have less than a 3 day supply:  [x] Yes  [] No    Would you like a call back once the refill request has been completed: [] Yes [x] No    If the office needs to give you a call back, can they leave a voicemail: [] Yes [x] No    Les Galindo Rep   08/13/24 16:09 EDT

## 2024-08-14 RX ORDER — ONDANSETRON 8 MG/1
8 TABLET, ORALLY DISINTEGRATING ORAL EVERY 8 HOURS PRN
Qty: 30 TABLET | Refills: 1 | Status: SHIPPED | OUTPATIENT
Start: 2024-08-14

## 2024-08-14 NOTE — TELEPHONE ENCOUNTER
Is This A New Presentation, Or A Follow-Up?: Skin Lesion
Is patient okay with changing to either Cialis or Levitra?  
Patient is agreeable to the cialis  
Tried to reach patient no answer left voicemail to return call    RELAY:    Is patient okay with changing to either Cialis or Levitra?     
How Severe Is Your Skin Lesion?: mild
Have Your Skin Lesions Been Treated?: not been treated
Additional History: Patient is here for a full skin examination today. He has a few rough spots he would like to have treated today

## 2024-08-19 DIAGNOSIS — N52.9 ERECTILE DYSFUNCTION, UNSPECIFIED ERECTILE DYSFUNCTION TYPE: Primary | ICD-10-CM

## 2024-08-19 RX ORDER — TADALAFIL 20 MG/1
20 TABLET ORAL DAILY PRN
Qty: 6 TABLET | Refills: 5 | Status: SHIPPED | OUTPATIENT
Start: 2024-08-19

## 2024-08-24 ENCOUNTER — DOCUMENTATION (OUTPATIENT)
Dept: INTERNAL MEDICINE | Facility: CLINIC | Age: 34
End: 2024-08-24
Payer: COMMERCIAL

## 2024-08-24 DIAGNOSIS — F41.9 ANXIETY: Primary | ICD-10-CM

## 2024-08-24 RX ORDER — CLONAZEPAM 2 MG/1
2 TABLET ORAL 3 TIMES DAILY PRN
Qty: 90 TABLET | Refills: 0 | Status: CANCELLED | OUTPATIENT
Start: 2024-08-24

## 2024-08-24 RX ORDER — CLONAZEPAM 2 MG/1
2 TABLET ORAL 2 TIMES DAILY PRN
Qty: 4 TABLET | Refills: 0 | Status: SHIPPED | OUTPATIENT
Start: 2024-08-24

## 2024-08-24 NOTE — PROGRESS NOTES
Patient called stating he will run out of Clonazepam today. He had increased his dose to TID short term due to stressful trip. Now decreasing back to BID. Discussed that it is against our policy to refill controlled substances on the weekend, but due to potential for withdrawal, will call in 4 tablets to be taken 1 tablet BID to prevent withdrawal. PDMP reviewed. He will need to call clinic Monday to get full refill. Counseled that patient will need to call during weekday clinic hours in the future for these.    Dr. Montero

## 2024-09-18 DIAGNOSIS — F41.9 ANXIETY: Primary | ICD-10-CM

## 2024-09-18 RX ORDER — CLONAZEPAM 2 MG/1
2 TABLET ORAL 3 TIMES DAILY PRN
Qty: 90 TABLET | Refills: 1 | Status: SHIPPED | OUTPATIENT
Start: 2024-09-18

## 2024-10-21 DIAGNOSIS — J34.2 DEVIATED SEPTUM: Primary | ICD-10-CM

## 2024-11-08 ENCOUNTER — TELEPHONE (OUTPATIENT)
Dept: INTERNAL MEDICINE | Facility: CLINIC | Age: 34
End: 2024-11-08

## 2024-11-08 ENCOUNTER — READMISSION MANAGEMENT (OUTPATIENT)
Dept: CALL CENTER | Facility: HOSPITAL | Age: 34
End: 2024-11-08
Payer: COMMERCIAL

## 2024-11-08 NOTE — TELEPHONE ENCOUNTER
Caller: [facility]  Relationship to patient: HOSPTIAL    Best call back number:      New or established patient?  [] New  [x] Established    Date of discharge: [id number]    Facility discharged from: [facility]    Diagnosis/Symptoms: SOA    Length of stay (If applicable): 4 DAYS ADMIT [id number]

## 2024-11-08 NOTE — OUTREACH NOTE
Prep Survey      Flowsheet Row Responses   [facility] facility patient discharged from? Non-BH   Is LACE score < 7 ? Non-BH Discharge   Eligibility [facility]   Date of Discharge 11/08/24   Discharge diagnosis Shortness of air   Does the patient have one of the following disease processes/diagnoses(primary or secondary)? Other   Prep survey completed? Yes            Jenn RIVER - Registered Nurse

## 2024-11-09 ENCOUNTER — TRANSITIONAL CARE MANAGEMENT TELEPHONE ENCOUNTER (OUTPATIENT)
Dept: CALL CENTER | Facility: HOSPITAL | Age: 34
End: 2024-11-09
Payer: COMMERCIAL

## 2024-11-09 NOTE — OUTREACH NOTE
Call Center TCM Note      Flowsheet Row Responses   [facility] patient discharged from? Non-  [[facility]]   Does the patient have one of the following disease processes/diagnoses(primary or secondary)? Other   TCM attempt successful? No   Unsuccessful attempts Attempt 1            Mamie Mendez RN    11/9/2024, 11:30 EST

## 2024-11-11 ENCOUNTER — OFFICE VISIT (OUTPATIENT)
Dept: INTERNAL MEDICINE | Facility: CLINIC | Age: 34
End: 2024-11-11
Payer: COMMERCIAL

## 2024-11-11 ENCOUNTER — TRANSITIONAL CARE MANAGEMENT TELEPHONE ENCOUNTER (OUTPATIENT)
Dept: CALL CENTER | Facility: HOSPITAL | Age: 34
End: 2024-11-11
Payer: COMMERCIAL

## 2024-11-11 VITALS
DIASTOLIC BLOOD PRESSURE: 94 MMHG | TEMPERATURE: 98.6 F | SYSTOLIC BLOOD PRESSURE: 146 MMHG | BODY MASS INDEX: 20.41 KG/M2 | HEART RATE: 88 BPM | WEIGHT: 150.5 LBS | RESPIRATION RATE: 18 BRPM

## 2024-11-11 DIAGNOSIS — G47.33 OSA (OBSTRUCTIVE SLEEP APNEA): ICD-10-CM

## 2024-11-11 DIAGNOSIS — F41.9 ANXIETY: ICD-10-CM

## 2024-11-11 DIAGNOSIS — J34.2 NASAL SEPTAL DEVIATION: Primary | ICD-10-CM

## 2024-11-11 PROCEDURE — 99214 OFFICE O/P EST MOD 30 MIN: CPT | Performed by: INTERNAL MEDICINE

## 2024-11-11 RX ORDER — CLONAZEPAM 2 MG/1
2 TABLET ORAL 3 TIMES DAILY PRN
Qty: 90 TABLET | Refills: 2 | Status: SHIPPED | OUTPATIENT
Start: 2024-11-11

## 2024-11-11 RX ORDER — CETIRIZINE HYDROCHLORIDE 10 MG/1
10 TABLET ORAL
COMMUNITY

## 2024-11-11 RX ORDER — HYDROCODONE BITARTRATE AND ACETAMINOPHEN 5; 325 MG/1; MG/1
1 TABLET ORAL
COMMUNITY
Start: 2024-11-08 | End: 2024-11-11

## 2024-11-11 NOTE — OUTREACH NOTE
Call Center TCM Note      Flowsheet Row Responses   [facility] patient discharged from? Non-  [[facility]]   Does the patient have one of the following disease processes/diagnoses(primary or secondary)? Other   TCM attempt successful? No   Unsuccessful attempts Attempt 2  [Pt has appt today at 1:30 with PCP]            Tatiana Ross LPN    11/11/2024, 08:53 EST

## 2024-11-11 NOTE — PROGRESS NOTES
Chief Complaint  Hospital Follow Up Visit    Jemima Brito is a 34 y.o. male.     Tr Brito presents to [facility] for     History of Present Illness  The patient presents for an ER follow-up.    He underwent a septoplasty with reduction on 11/07/2024. He has a history of a nasal fracture from his high school years, which has progressively worsened over time, leading to difficulty in breathing, particularly during sleep. This has resulted in him resorting to mouth breathing. Approximately 1.5 years ago, he was advised to undergo surgery to correct this issue. The initial post-operative period was challenging, but his condition has since improved. He reports minimal bleeding and is currently on antibiotics. He is also receiving allergy injections.    He is unable to sneeze, tie his shoes, or lift anything over 10 pounds. His work is physically demanding, involving tasks such as moving boxes, washers, dryers, and refrigerators. He has not yet returned to work. He experiences headaches and has applied for short-term disability.     Patient presents with    Hospital Follow Up Visit     Transitional Care Follow Up Visit  Jemima Brito is a 34 y.o. male who presents for a transitional care management visit.    Within 48 business hours after discharge our office contacted him via telephone to coordinate his care and needs.      I reviewed and discussed the details of that call along with the discharge summary, hospital problems, inpatient lab results, inpatient diagnostic studies, and consultation reports with Tr.     Current outpatient and discharge medications have been reconciled for the patient.  Reviewed by: Lisa Haji MA          11/8/2024     8:58 AM   Date of TCM Phone Call   [facility]   Date of Discharge 11/8/2024       The following portions of the patient's history were reviewed and  updated as appropriate: allergies, current medications, past family history, past medical history, past social history, past surgical history, and problem list.    Review of Systems   All other systems reviewed and are negative.      Objective   Body mass index is 20.41 kg/m².  BMI is within normal parameters. No other follow-up for BMI required.       Vital Signs:   /94 (BP Location: Right arm, Patient Position: Sitting, Cuff Size: Adult)   Pulse 88   Temp 98.6 °F (37 °C) (Temporal)   Resp 18   Wt 68.3 kg (150 lb 8 oz)   BMI 20.41 kg/m²       Physical Exam  Patient is alert x3, in no distress.  Head is normocephalic, atraumatic. Pupils are equal to light accommodation. Extraocular muscles are intact. Membranes are moist.  Neck is supple. No cervical lymphadenopathy, no goiter.  Lungs are clear to auscultation, no rhonchi, wheeze.  Heart sounds S1, S2. No murmurs, rubs, or gallops.       Results              Assessment and Plan  Diagnoses and all orders for this visit:  Assessment & Plan  1. Post-septoplasty status.  The patient had a septoplasty with some reduction on 11/07/2024 due to a previously broken nose that caused a deviated septum and breathing difficulties. He reports that the first few days post-surgery were rough, but the bleeding has significantly reduced. He is still experiencing some bleeding and has been taking antibiotics. He is advised to avoid sneezing, tying shoes, lifting anything over 10 pounds, and bending beyond a certain plane for 2 weeks to prevent further complications. He has applied for a 2-week short-term disability leave from work due to the labor-intensive nature of his job.    2. Allergic rhinitis.  The patient is currently receiving allergy shots and reports that his treatment is not yet complete. He is advised to continue with his allergy shots as prescribed.       Diagnoses and all orders for this visit:    1. Nasal septal deviation (Primary)    2. MEME (obstructive  sleep apnea)  -     Ambulatory Referral to Sleep Medicine                Follow Up   No follow-ups on file.  Patient was given instructions and counseling regarding his condition or for health maintenance advice. Please see specific information pulled into the AVS if appropriate.     Patient or patient representative verbalized consent for the use of Ambient Listening during the visit with  Jorje Hurtado MD for chart documentation. 11/11/2024  14:01 EST

## 2024-11-11 NOTE — PROGRESS NOTES
Chief Complaint   Patient presents with    Hospital Follow Up Visit     Transitional Care Follow Up Visit  Subjective     Tr Brito is a 34 y.o. male who presents for a transitional care management visit.    Within 48 business hours after discharge our office contacted him via telephone to coordinate his care and needs.      I reviewed and discussed the details of that call along with the discharge summary, hospital problems, inpatient lab results, inpatient diagnostic studies, and consultation reports with Tr.     Current outpatient and discharge medications have been reconciled for the patient.  Reviewed by: Lisa Haji MA          11/8/2024     8:58 AM   Date of TCM Phone Call   [facility]   Date of Discharge 11/8/2024     Risk for Readmission (LACE) No data recorded    History of Present Illness   Course During Hospital Stay:  ***     {Common H&P Review Areas:67261}    Review of Systems    Objective   /94 (BP Location: Right arm, Patient Position: Sitting, Cuff Size: Adult)   Pulse 88   Temp 98.6 °F (37 °C) (Temporal)   Resp 18   Wt 68.3 kg (150 lb 8 oz)   BMI 20.41 kg/m²   Physical Exam    Assessment & Plan   {Assess/PlanSmartLinks:89444}

## 2024-11-12 ENCOUNTER — TRANSITIONAL CARE MANAGEMENT TELEPHONE ENCOUNTER (OUTPATIENT)
Dept: CALL CENTER | Facility: HOSPITAL | Age: 34
End: 2024-11-12
Payer: COMMERCIAL

## 2024-11-12 NOTE — OUTREACH NOTE
Call Center TCM Note      Flowsheet Row Responses   [facility] patient discharged from? Non-  [[place]]   Does the patient have one of the following disease processes/diagnoses(primary or secondary)? Other   TCM attempt successful? Yes   Call start time 0849   Call end time 0849   Discharge diagnosis Shortness of air   TCM call completed? Yes   Wrap up additional comments Patient has a documented hospital follow up appt with PCP for yesterday (11/11).  This fulfills TCM requirements and no phone call is needed.   Call end time 0849   Would this patient benefit from a Referral to Amb Social Work? No   Is the patient interested in additional calls from an ambulatory ? No            Soco Sarah RN    11/12/2024, 08:50 EST

## 2024-11-19 ENCOUNTER — TELEPHONE (OUTPATIENT)
Dept: INTERNAL MEDICINE | Facility: CLINIC | Age: 34
End: 2024-11-19
Payer: COMMERCIAL

## 2024-11-19 NOTE — TELEPHONE ENCOUNTER
"  Assessment & Plan   Problem List Items Addressed This Visit    None  Visit Diagnoses       Acute recurrent streptococcal tonsillitis    -  Primary           No symptoms today no evidence of tonsillar hypertrophy.  We did discuss recurrent strep infections as well as indications for tonsillectomy.  Suspect incomplete treatment with her earlier bout of strep.  Would recommend Augmentin in the future if recurrent infection and she will message me with new symptoms with plan for strep testing.  Follow-up as needed.    BMI:   Estimated body mass index is 27.44 kg/m  as calculated from the following:    Height as of this encounter: 1.676 m (5' 6\").    Weight as of this encounter: 77.1 kg (170 lb).         Ever Cuellar MD  [facility]    Fabrice Felipe is a 33 year old, presenting for the following health issues:  Throat Problem        1/2/2024     8:10 AM   Additional Questions   Roomed by Cassie       History of Present Illness       Reason for visit:  Frequent strep  Symptom onset:  More than a month  Symptoms include:  Frequent strep  Symptom intensity:  Mild  Symptom progression:  Staying the same  Had these symptoms before:  No  What makes it worse:  Nno  What makes it better:  No    She eats 4 or more servings of fruits and vegetables daily.She consumes 0 sweetened beverage(s) daily.She exercises with enough effort to increase her heart rate 20 to 29 minutes per day.  She exercises with enough effort to increase her heart rate 3 or less days per week.   She is taking medications regularly.     Patient feeling well at this time but has been treated 3 separate occasions for strep over this fall.  Notes history of being treated in room 1 to 2 years in the past.  Has never had tonsils or adenoids removed.  Generally healthy without major medical issues.  No other frequent household contacts with recurrent strep.  Does have 4 small children.      Review of Systems " Form has been placed in Dr. Hurtado's inbox.   "  Constitutional, HEENT, cardiovascular, pulmonary, GI, , musculoskeletal, neuro, skin, endocrine and psych systems are negative, except as otherwise noted.      Objective    /66   Pulse 90   Temp 97.3  F (36.3  C) (Temporal)   Resp 16   Ht 1.676 m (5' 6\")   Wt 77.1 kg (170 lb)   SpO2 97%   BMI 27.44 kg/m    Body mass index is 27.44 kg/m .  Physical Exam   GENERAL: healthy, alert and no distress  EYES: Eyes grossly normal to inspection, PERRL and conjunctivae and sclerae normal  HENT: ear canals and TM's normal, nose and mouth without ulcers or lesions  NECK: no adenopathy, no asymmetry, masses, or scars and thyroid normal to palpation  RESP: lungs clear to auscultation - no rales, rhonchi or wheezes  CV: regular rate and rhythm, normal S1 S2, no S3 or S4, no murmur, click or rub, no peripheral edema and peripheral pulses strong  MS: no gross musculoskeletal defects noted, no edema  SKIN: no suspicious lesions or rashes  NEURO: Normal strength and tone, mentation intact and speech normal  PSYCH: mentation appears normal, affect normal/bright                  "

## 2024-11-19 NOTE — TELEPHONE ENCOUNTER
Caller: Tr Brito    Relationship: Self    Best call back number: [phone]     What form or medical record are you requesting: SHORT TERM DISABILITY FORMS     Who is requesting this form or medical record from you: [employer]     How would you like to receive the form or medical records (pick-up, mail, fax): UPLOAD TO "[employer]  If fax, what is the fax number:   If mail, what is the address:   If pick-up, provide patient with address and location details    Timeframe paperwork needed: BY END OF WEEK, PATIENT CANNOT BE PAID UNTIL FORMS ARE SUBMITTED.     Additional notes: PATIENT WAS SEEN BY PCP ON 11/11/24 AND SENT PCP A DETAILED "[employer] MESSAGE ON 11/14/24 WITH THE SHORT TERM DISABILITY PAPERWORK ATTACHED. PATIENT WOULD LIKE TO FOLLOW UP WITH PCP AND CONFIRM HE HAS RECEIVED THIS PAPERWORK AND ENSURE PCP DOES NOT NEED ANY FURTHER DETAILS TO COMPLETE THE FORMS.

## 2024-11-21 NOTE — TELEPHONE ENCOUNTER
Caller: Tr Brito     Relationship: Self     Best call back number: [phone]      What form or medical record are you requesting: SHORT TERM DISABILITY FORMS      Who is requesting this form or medical record from you: [employer]      How would you like to receive the form or medical records (pick-up, mail, fax): UPLOAD TO [employer]  If fax, what is the fax number:   If mail, what is the address:   If pick-up, provide patient with address and location details     Timeframe paperwork needed: BY END OF WEEK, PATIENT CANNOT BE PAID UNTIL FORMS ARE SUBMITTED. FORMS MUST BE SUBMITTED BY 12/2/24 TO [facility].     Additional notes: PATIENT WAS SEEN BY PCP ON 11/11/24 AND SENT PCP A DETAILED [employer] MESSAGE ON 11/14/24 WITH THE SHORT TERM DISABILITY PAPERWORK ATTACHED. PATIENT WOULD LIKE TO FOLLOW UP WITH PCP AND CONFIRM HE HAS RECEIVED THIS PAPERWORK AND ENSURE PCP DOES NOT NEED ANY FURTHER DETAILS TO COMPLETE THE FORMS.

## 2024-11-22 NOTE — TELEPHONE ENCOUNTER
Patient is asking for paperwork to have it done ASAP because otherwise he will loose $3000. OK to send forms via [employer] back to patient.

## 2024-12-05 DIAGNOSIS — R53.83 OTHER FATIGUE: ICD-10-CM

## 2024-12-05 DIAGNOSIS — D64.9 ANEMIA, UNSPECIFIED TYPE: Primary | ICD-10-CM

## 2024-12-05 DIAGNOSIS — E53.8 FOLATE DEFICIENCY: ICD-10-CM

## 2024-12-05 DIAGNOSIS — E53.8 VITAMIN B 12 DEFICIENCY: ICD-10-CM

## 2024-12-06 ENCOUNTER — TELEPHONE (OUTPATIENT)
Dept: INTERNAL MEDICINE | Facility: CLINIC | Age: 34
End: 2024-12-06
Payer: COMMERCIAL

## 2024-12-06 NOTE — TELEPHONE ENCOUNTER
Left voice message for patient to return call.    Relay: What is patient's position at his current job? Disability forms are ready.

## 2024-12-06 NOTE — TELEPHONE ENCOUNTER
Patient called and was read the message. His current position at his job is .  Call:  [phone]

## 2024-12-09 ENCOUNTER — OFFICE VISIT (OUTPATIENT)
Dept: INTERNAL MEDICINE | Facility: CLINIC | Age: 34
End: 2024-12-09
Payer: COMMERCIAL

## 2024-12-09 VITALS
BODY MASS INDEX: 19.82 KG/M2 | WEIGHT: 146.13 LBS | HEART RATE: 96 BPM | SYSTOLIC BLOOD PRESSURE: 132 MMHG | TEMPERATURE: 98.9 F | RESPIRATION RATE: 18 BRPM | DIASTOLIC BLOOD PRESSURE: 96 MMHG

## 2024-12-09 DIAGNOSIS — F41.9 ANXIETY AND DEPRESSION: ICD-10-CM

## 2024-12-09 DIAGNOSIS — F32.A ANXIETY AND DEPRESSION: ICD-10-CM

## 2024-12-09 DIAGNOSIS — F10.10 ALCOHOL ABUSE: Primary | ICD-10-CM

## 2024-12-09 PROCEDURE — 99215 OFFICE O/P EST HI 40 MIN: CPT | Performed by: INTERNAL MEDICINE

## 2024-12-09 NOTE — PROGRESS NOTES
Chief Complaint  Med Refill (Follow up)    Subjective    Tr Brito is a 34 y.o. male.     Tr Brito presents to [facility] for     History of Present Illness  The patient presents for evaluation of chronic fatigue.    He is experiencing persistent fatigue.  Patient wanted to go over and review his FMLA papers to make sure appropriate      Patient is wanting to pursue behavioral health counseling      The following portions of the patient's history were reviewed and updated as appropriate: allergies, current medications, past family history, past medical history, past social history, past surgical history, and problem list.    Review of Systems   All other systems reviewed and are negative.      Objective   Body mass index is 19.82 kg/m².  BMI is within normal parameters. No other follow-up for BMI required.       Vital Signs:   /96 (BP Location: Right arm, Patient Position: Sitting, Cuff Size: Adult)   Pulse 96   Temp 98.9 °F (37.2 °C) (Temporal)   Resp 18   Wt 66.3 kg (146 lb 2 oz)   BMI 19.82 kg/m²       Physical Exam  Alert x 3 nondistressed  HEENT pupils equal light commendation extraocular muscles intact moist membranes  Chest: Clear to auscultation no rhonchi wheeze   Cardiac: S1-S2 no murmurs rubs or gallop  Peripheral vascular +2 pulses warm extremity good perfusion       Results              Assessment and Plan  Diagnoses and all orders for this visit:  Spent approximately 45 minutes face-to-face with patient along with 10 minutes to fill out and recorrect FMLA papers  Assessment & Plan  1. Chronic fatigue.  The patient discussed concerns about the way [facility] He is seeking clarification and potential adjustments before sending it to his employer. The patient was reassured that the correct documentation would be retrieved and reviewed.     Diagnoses and all orders for this visit:    1.  Alcohol abuse (Primary)  -     Ambulatory Referral to Behavioral Health    2. Anxiety and depression  -     Ambulatory Referral to Behavioral Health                  Follow Up   No follow-ups on file.  Patient was given instructions and counseling regarding his condition or for health maintenance advice. Please see specific information pulled into the AVS if appropriate.     Patient or patient representative verbalized consent for the use of Ambient Listening during the visit with  Jorje Hurtado MD for chart documentation. 12/9/2024  11:42 EST

## 2024-12-10 ENCOUNTER — TELEPHONE (OUTPATIENT)
Dept: INTERNAL MEDICINE | Facility: CLINIC | Age: 34
End: 2024-12-10
Payer: COMMERCIAL

## 2024-12-10 DIAGNOSIS — F32.A ANXIETY AND DEPRESSION: Primary | ICD-10-CM

## 2024-12-10 DIAGNOSIS — F41.9 ANXIETY AND DEPRESSION: Primary | ICD-10-CM

## 2024-12-10 NOTE — TELEPHONE ENCOUNTER
Patient called back. He will call me back once he speaks with his employer.    Please warm transfer.

## 2024-12-13 ENCOUNTER — TELEPHONE (OUTPATIENT)
Dept: INTERNAL MEDICINE | Facility: CLINIC | Age: 34
End: 2024-12-13
Payer: COMMERCIAL

## 2024-12-13 NOTE — TELEPHONE ENCOUNTER
PATIENT CALLED TO STATE THAT HIS [facility] PAPERWORK WAS ONLY APPROVED THROUGH 12/26/24.     THEY COULDN'T APPROVE IT FOR LONGER AS THERE WASN'T A PLAN OF CARE ATTACHED TO THE PAPERWORK.     PATIENT NEEDS TO HAVE ALL PAPERWORK IN BY 12/15/25    WOULD LIKE TO SPEAK TO JANES.

## 2024-12-13 NOTE — TELEPHONE ENCOUNTER
Spoke with patient and he stated that he is only approved until 12/26/2024 instead of 03/24/2025. Patient stated the insurance company for his short term needs to have a treatment plan and a list of all the specialist.

## 2024-12-16 ENCOUNTER — TELEPHONE (OUTPATIENT)
Dept: INTERNAL MEDICINE | Facility: CLINIC | Age: 34
End: 2024-12-16
Payer: COMMERCIAL

## 2024-12-16 NOTE — TELEPHONE ENCOUNTER
Spoke to patient and he will send a mychart message with his goals, list of specialist, and anyone that patient needs or wants referrals to obtain.

## 2024-12-16 NOTE — TELEPHONE ENCOUNTER
PATIENT CALLING ABOUT [facility] PAPERWORK. WOULD LIKE TO SPEAK TO JANES.         [phone]

## 2025-01-03 ENCOUNTER — TELEPHONE (OUTPATIENT)
Dept: INTERNAL MEDICINE | Facility: CLINIC | Age: 35
End: 2025-01-03

## 2025-01-03 ENCOUNTER — OFFICE VISIT (OUTPATIENT)
Dept: INTERNAL MEDICINE | Facility: CLINIC | Age: 35
End: 2025-01-03
Payer: COMMERCIAL

## 2025-01-03 VITALS
WEIGHT: 153.13 LBS | HEART RATE: 88 BPM | DIASTOLIC BLOOD PRESSURE: 100 MMHG | RESPIRATION RATE: 18 BRPM | SYSTOLIC BLOOD PRESSURE: 142 MMHG | BODY MASS INDEX: 20.77 KG/M2 | TEMPERATURE: 98.4 F

## 2025-01-03 DIAGNOSIS — F41.9 ANXIETY AND DEPRESSION: Primary | ICD-10-CM

## 2025-01-03 DIAGNOSIS — F32.A ANXIETY AND DEPRESSION: Primary | ICD-10-CM

## 2025-01-03 DIAGNOSIS — Z79.899 HIGH RISK MEDICATION USE: ICD-10-CM

## 2025-01-03 DIAGNOSIS — R53.83 OTHER FATIGUE: ICD-10-CM

## 2025-01-03 DIAGNOSIS — R63.5 WEIGHT GAIN: ICD-10-CM

## 2025-01-03 PROCEDURE — 99214 OFFICE O/P EST MOD 30 MIN: CPT | Performed by: INTERNAL MEDICINE

## 2025-01-03 RX ORDER — ERGOCALCIFEROL 1.25 MG/1
1 CAPSULE, LIQUID FILLED ORAL WEEKLY
COMMUNITY
Start: 2024-12-13

## 2025-01-03 NOTE — PROGRESS NOTES
Chief Complaint  Med Refill (Follow up)    Subjective    Tr Brito is a 34 y.o. male.     Tr Brito presents to [facility] for     History of Present Illness  The patient presents for evaluation of anxiety, depression, weight gain, and chronic fatigue.    He reports an improvement in his overall health status, as evidenced by a weight gain of 8 pounds. He attributes this positive change to the detailed dietary guidelines provided by his nutritionist. Despite these improvements, he continues to experience fatigue, although at a reduced level. He also reports an increased appetite. His future plans include further weight gain, relaxation, and engaging in light physical activities at his apartment gym. He has been supplementing his diet with a daily men's multivitamin, as recommended by his nutritionist, who advised against iron supplementation.    He has been experiencing issues with his paperwork, which he believes is causing him stress. He has been in contact with his , who has extended his paid leave until 01/20/2024. He has been advised to follow up with his  after 48 hours to ensure that his paperwork has been received.    MEDICATIONS  Men's multivitamin.       The following portions of the patient's history were reviewed and updated as appropriate: allergies, current medications, past family history, past medical history, past social history, past surgical history, and problem list.    Review of Systems   All other systems reviewed and are negative.      Objective   Body mass index is 20.77 kg/m².  BMI is within normal parameters. No other follow-up for BMI required.       Vital Signs:   /100 (BP Location: Right arm, Patient Position: Sitting, Cuff Size: Adult)   Pulse 88   Temp 98.4 °F (36.9 °C) (Temporal)   Resp 18   Wt 69.5 kg (153 lb 2 oz)   BMI 20.77 kg/m²       Physical Exam  Patient is alert and  oriented x3, shows no signs of distress.  Head is normocephalic and atraumatic. Pupils are equal and responsive to light and accommodation. Extraocular muscles are intact. Membranes in the mouth are moist.  Neck is supple. No cervical lymphadenopathy or goiter detected.  Lungs are clear to auscultation, no rhonchi or wheezing.  Heart sounds S1 and S2 are present. No murmurs, rubs, or gallops detected.       Results              Assessment and Plan  Diagnoses and all orders for this visit:  Spent 30 minutes face to face with patient review medical management   Assessment & Plan  1. Anxiety.  A comprehensive discussion was held regarding the management of his anxiety. He is currently adhering to his medication regimen and demonstrating significant improvement. Additionally, he is actively participating in counseling sessions, which are proving beneficial.    2. Depression.  Similar to his anxiety management, he is following his prescribed medication regimen and showing positive progress. Counseling sessions are also contributing to his improvement.    3. Weight gain.  He has been consulting with a nutritionist and has successfully gained 8 pounds through dietary modifications. This progress indicates a positive response to the nutritional interventions.    4. Chronic fatigue.  His chronic fatigue has shown improvement, with increased energy levels and a heightened focus on lifestyle modifications. Continued monitoring of this condition is planned.    5. Paperwork issues.  Current notes will be faxed today.       Diagnoses and all orders for this visit:    1. Anxiety and depression (Primary)  -     ToxAssure Flex 23, Ur -; Future  -     ToxAssure Flex 23, Ur - Urine, Clean Catch    2. High risk medication use  -     ToxAssure Flex 23, Ur -; Future  -     ToxAssure Flex 23, Ur - Urine, Clean Catch    3. Weight gain    4. Other fatigue              Follow Up   No follow-ups on file.  Patient was given instructions and  counseling regarding his condition or for health maintenance advice. Please see specific information pulled into the AVS if appropriate.     Patient or patient representative verbalized consent for the use of Ambient Listening during the visit with  Jorje Hurtado MD for chart documentation. 1/3/2025  09:30 EST

## 2025-01-10 DIAGNOSIS — F41.9 ANXIETY: ICD-10-CM

## 2025-01-10 LAB
1OH-MIDAZOLAM UR QL SCN: NOT DETECTED NG/MG CREAT
6MAM UR QL SCN: NEGATIVE NG/ML
7AMINOCLONAZEPAM/CREAT UR: 135 NG/MG CREAT
A-OH ALPRAZ/CREAT UR: NOT DETECTED NG/MG CREAT
A-OH-TRIAZOLAM/CREAT UR CFM: NOT DETECTED NG/MG CREAT
ACP UR QL CFM: NOT DETECTED
ALPRAZ/CREAT UR CFM: NOT DETECTED NG/MG CREAT
AMOBARBITAL UR CFM-MCNC: NOT DETECTED NG/ML
AMPHET UR CFM-MCNC: NOT DETECTED NG/MG CREAT
AMPHETAMINES UR QL SCN: NORMAL NG/ML
AMPHETAMINES UR QL: NEGATIVE
APAP UR QL SCN: NORMAL UG/ML
APAP UR QL: NEGATIVE
APAP UR-MCNC: NOT DETECTED UG/ML
BARBITAL UR QL CFM: NOT DETECTED
BARBITURATES UR QL CFM: NEGATIVE
BARBITURATES UR QL SCN: NORMAL NG/ML
BENZODIAZ SCN METH UR: NORMAL
BUPRENORPHINE UR QL SCN: NEGATIVE
BUPRENORPHINE/CREAT UR: NOT DETECTED NG/MG CREAT
BUTABARBITAL UR QL CFM: NOT DETECTED
BUTALBITAL UR CFM-MCNC: NOT DETECTED NG/ML
CANNABINOIDS UR QL CFM: NEGATIVE
CANNABINOIDS UR QL SCN: NORMAL NG/ML
CARBOXYTHC UR CFM-MCNC: NOT DETECTED NG/MG CREAT
CARISOPRODOL UR QL: NEGATIVE NG/ML
CLONAZEPAM/CREAT UR CFM: 113 NG/MG CREAT
COCAINE+BZE UR QL SCN: NEGATIVE NG/ML
CODEINE UR CFM-MCNC: NOT DETECTED NG/MG CREAT
CREAT UR-MCNC: 113 MG/DL
D-METHORPHAN UR-MCNC: NOT DETECTED NG/ML
D-METHORPHAN+LEVORPHANOL UR QL: NOT DETECTED
DESALKYLFLURAZ/CREAT UR: NOT DETECTED NG/MG CREAT
DHC/CREAT UR: NOT DETECTED NG/MG CREAT
DIAZEPAM/CREAT UR: NOT DETECTED NG/MG CREAT
EDDP UR CFM-MCNC: NOT DETECTED NG/MG CREAT
ETG ETS UR QL CFM: NEGATIVE
ETHANOL UR CFM-MCNC: 0.02 G/DL
ETHANOL UR QL SCN: NORMAL G/DL
ETHANOL UR QL SCN: NORMAL NG/ML
ETHANOL UR QL: NORMAL
ETHYL GLUCURONIDE UR CFM-MCNC: NOT DETECTED NG/MG CREAT
ETHYL SULFATE UR CFM-MCNC: NOT DETECTED NG/MG CREAT
FENTANYL CTO UR SCN-MCNC: NEGATIVE NG/ML
FENTANYL/CREAT UR: NOT DETECTED NG/MG CREAT
FLUNITRAZEPAM UR QL SCN: NOT DETECTED NG/MG CREAT
GABAPENTIN UR CFM-MCNC: NOT DETECTED NG/ML
GABAPENTIN UR QL CFM: NEGATIVE
GABAPENTIN UR-MCNC: NORMAL UG/ML
HALLUCINOGENS UR: NEGATIVE
HALLUCINOGENS UR: NEGATIVE
HYDROCODONE UR CFM-MCNC: NOT DETECTED NG/MG CREAT
HYDROMORPHONE UR CFM-MCNC: NOT DETECTED NG/MG CREAT
HYPNOTICS UR QL SCN: NEGATIVE
KETAMINE UR QL: NOT DETECTED
LORAZEPAM/CREAT UR: NOT DETECTED NG/MG CREAT
MDA UR CFM-MCNC: NOT DETECTED NG/MG CREAT
MDMA UR CFM-MCNC: NOT DETECTED NG/MG CREAT
MEPERIDINE UR QL SCN: NEGATIVE NG/ML
MEPHOBARBITAL UR QL CFM: NOT DETECTED
METHADONE UR QL CFM: NEGATIVE
METHADONE UR QL SCN: NORMAL NG/ML
METHADONE+METAB UR QL SCN: NORMAL NG/ML
METHADONE/CREAT UR: NOT DETECTED NG/MG CREAT
METHAMPHET UR CFM-MCNC: NOT DETECTED NG/MG CREAT
MIDAZOLAM/CREAT UR CFM: NOT DETECTED NG/MG CREAT
MISCELLANEOUS, UR: NEGATIVE
MORPHINE UR CFM-MCNC: NOT DETECTED NG/MG CREAT
NORBUPRENORPHINE/CREAT UR: NOT DETECTED NG/MG CREAT
NORCODEINE/CREAT UR CFM: NOT DETECTED NG/MG CREAT
NORDIAZEPAM/CREAT UR: NOT DETECTED NG/MG CREAT
NORFENTANYL/CREAT UR: NOT DETECTED NG/MG CREAT
NORFLUNITRAZEPAM UR-MCNC: NOT DETECTED NG/MG CREAT
NORHYDROCODONE UR CFM-MCNC: NOT DETECTED NG/MG CREAT
NORKETAMINE UR-MCNC: NOT DETECTED UG/ML
NORMORPHINE UR-MCNC: NOT DETECTED NG/MG CREAT
NOROXYCODONE UR CFM-MCNC: NOT DETECTED NG/MG CREAT
NOROXYMORPHONE/CREAT UR CFM: NOT DETECTED NG/MG CREAT
OPIATES UR QL CFM: NEGATIVE
OPIATES UR SCN-MCNC: NORMAL NG/ML
OXAZEPAM/CREAT UR: NOT DETECTED NG/MG CREAT
OXYCODONE CTO UR SCN-MCNC: NORMAL NG/ML
OXYCODONE UR CFM-MCNC: NOT DETECTED NG/MG CREAT
OXYCODONE UR QL CFM: NEGATIVE
OXYMORPHONE UR CFM-MCNC: NOT DETECTED NG/MG CREAT
PCP UR CFM-MCNC: NOT DETECTED NG/ML
PCP UR QL SCN: NORMAL NG/ML
PENTOBARB UR CFM-MCNC: NOT DETECTED NG/ML
PHENOBARB UR CFM-MCNC: NOT DETECTED NG/ML
PRESCRIBED MEDICATIONS: NORMAL
PROPOXYPH UR QL SCN: NEGATIVE NG/ML
SECOBARBITAL UR CFM-MCNC: NOT DETECTED NG/ML
TAPENTADOL CTO UR SCN-MCNC: NEGATIVE NG/ML
TEMAZEPAM/CREAT UR: NOT DETECTED NG/MG CREAT
THIOPENTAL UR QL CFM: NOT DETECTED
TRAMADOL UR QL SCN: NEGATIVE NG/ML
ZALEPLON UR-MCNC: NOT DETECTED NG/ML
ZOLPIDEM PHENYL-4-CARB UR QL SCN: NOT DETECTED
ZOLPIDEM UR QL SCN: NOT DETECTED
ZOPICLONE-N-OXIDE UR-MCNC: NOT DETECTED NG/ML

## 2025-01-10 RX ORDER — CLONAZEPAM 2 MG/1
2 TABLET ORAL 3 TIMES DAILY PRN
Qty: 90 TABLET | Refills: 2 | Status: SHIPPED | OUTPATIENT
Start: 2025-01-10

## 2025-01-10 NOTE — TELEPHONE ENCOUNTER
Last office visit (LOV) for chronic condition 1/3/25  Next office visit (NOV) 5/30/25  UDS-1/3/25  1/30/25

## 2025-01-15 DIAGNOSIS — F41.9 ANXIETY: ICD-10-CM

## 2025-01-15 RX ORDER — CLONAZEPAM 2 MG/1
2 TABLET ORAL 3 TIMES DAILY PRN
Qty: 90 TABLET | Refills: 2 | OUTPATIENT
Start: 2025-01-15

## 2025-01-24 ENCOUNTER — TELEPHONE (OUTPATIENT)
Dept: INTERNAL MEDICINE | Facility: CLINIC | Age: 35
End: 2025-01-24
Payer: COMMERCIAL

## 2025-01-24 NOTE — TELEPHONE ENCOUNTER
Caller: Tr Brito    Relationship: Self    Best call back number: [phone]     Which medication are you concerned about: clonazePAM (KlonoPIN) 2 MG table    Who prescribed you this medication:DR COCHRAN    What are your concerns: PATIENT PICKED UP A PRESCRIPTION ON 01/13/25. STATES [facility] PATIENT GOT A NOTICE THE MEDICATION WAS DENIED.    PATIENT CONCERNED ABOUT FUTURE REFILL REQUESTS. ASKING FOR A CALL BACK.    How long have you had these concerns: SINCE PICKING UP THE MEDICATION HE GETS A DAILY PHONE CALL STATING THIS MEDICATION HAS BEEN DENIED BY THE PROVIDER AND TO CALL HIS PROVIDER.    PLEASE CALL PATIENT.

## 2025-02-10 ENCOUNTER — TELEPHONE (OUTPATIENT)
Dept: INTERNAL MEDICINE | Facility: CLINIC | Age: 35
End: 2025-02-10
Payer: COMMERCIAL

## 2025-02-10 DIAGNOSIS — F41.9 ANXIETY: ICD-10-CM

## 2025-02-10 RX ORDER — CLONAZEPAM 2 MG/1
2 TABLET ORAL 3 TIMES DAILY PRN
Qty: 90 TABLET | Refills: 2 | Status: CANCELLED | OUTPATIENT
Start: 2025-02-10

## 2025-02-10 NOTE — TELEPHONE ENCOUNTER
Caller: Tr Brito    Relationship: Self    Best call back number:      Requested Prescriptions:   Requested Prescriptions     Pending Prescriptions Disp Refills    clonazePAM (KlonoPIN) 2 MG tablet 90 tablet 2     Sig: Take 1 tablet by mouth 3 (Three) Times a Day As Needed. for anxiety        Pharmacy where request should be sent: [id number] - [place], [place] - [address] AT [facility] [address] - [phone]  - [phone] FX     Last office visit with prescribing clinician: 1/3/2025   Last telemedicine visit with prescribing clinician: Visit date not found   Next office visit with prescribing clinician: 3/13/2025     Additional details provided by patient: PATIENT IS NEEDING THIS AS SOON AS POSSIBLE AS IT WILL PROBABLY  WILL SNOW AND HE CAN'T PICK THIS UP UNTIL THE 29TH DAY AND WILL BE OUT OF MEDICATION    Does the patient have less than a 3 day supply:  [x] Yes  [] No      Les Rivera Rep   02/10/25 15:07 EST

## 2025-02-11 NOTE — TELEPHONE ENCOUNTER
I called and spoke to the pharmacy. They stated patient can  his medication today after 11:30am.    Left message with patient relaying what the pharmacy stated.     Patient had refills remaining.

## 2025-02-17 ENCOUNTER — TELEPHONE (OUTPATIENT)
Dept: INTERNAL MEDICINE | Facility: CLINIC | Age: 35
End: 2025-02-17
Payer: COMMERCIAL

## 2025-02-17 NOTE — TELEPHONE ENCOUNTER
"Called patient and scheduled as recommended by provider:\" Go ahead and see if we can work patient in next week.\"  "

## 2025-02-20 ENCOUNTER — OFFICE VISIT (OUTPATIENT)
Dept: INTERNAL MEDICINE | Facility: CLINIC | Age: 35
End: 2025-02-20
Payer: COMMERCIAL

## 2025-02-20 VITALS
BODY MASS INDEX: 21.07 KG/M2 | SYSTOLIC BLOOD PRESSURE: 124 MMHG | RESPIRATION RATE: 18 BRPM | HEART RATE: 90 BPM | WEIGHT: 155.38 LBS | DIASTOLIC BLOOD PRESSURE: 80 MMHG | OXYGEN SATURATION: 99 % | TEMPERATURE: 97 F

## 2025-02-20 DIAGNOSIS — H01.9 INFECTED EYE LID: Primary | ICD-10-CM

## 2025-02-20 DIAGNOSIS — F41.9 ANXIETY: ICD-10-CM

## 2025-02-20 DIAGNOSIS — R06.89 DIFFICULTY BREATHING: ICD-10-CM

## 2025-02-20 DIAGNOSIS — H01.004 BLEPHARITIS OF LEFT UPPER EYELID, UNSPECIFIED TYPE: ICD-10-CM

## 2025-02-20 PROCEDURE — 99214 OFFICE O/P EST MOD 30 MIN: CPT | Performed by: INTERNAL MEDICINE

## 2025-02-20 RX ORDER — ERYTHROMYCIN 5 MG/G
OINTMENT OPHTHALMIC
Qty: 3.5 G | Refills: 0 | Status: SHIPPED | OUTPATIENT
Start: 2025-02-20

## 2025-02-20 RX ORDER — DOXYCYCLINE 100 MG/1
100 CAPSULE ORAL 2 TIMES DAILY
Qty: 20 CAPSULE | Refills: 0 | Status: SHIPPED | OUTPATIENT
Start: 2025-02-20

## 2025-02-20 NOTE — PROGRESS NOTES
Chief Complaint  Shortness of Breath    Subjective    Tr Brito is a 34 y.o. male.     Tr Brito presents to [facility] for     History of Present Illness  The patient presents for evaluation of breathing issues and anxiety.    He reports experiencing intermittent episodes of dyspnea, which he suspects may be anxiety-related. These episodes are unpredictable in duration, sometimes lasting for hours, and are not present every day. He does not smoke. He has been abstinent from alcohol for the past 2 years but admits to using NyQuil and mouthwash 3 times daily. He also reports a recent illness 3 weeks ago, during which he lost approximately 4 pounds, although he has since regained the weight. His blood pressure and oxygen saturation levels have been within normal limits.    He has been on a regimen of clonazepam 3 times daily, which he finds beneficial for his anxiety, although it takes approximately 1.5 hours to take effect. He has been advised against physical exertion and is currently under the care of a nutritionist who is assisting him in weight gain.    Supplemental Information  He has had a stye 3 to 4 times and has needed antibiotics every time. The first 2 times he was given amoxicillin, but it stopped working. The second and third time he was given another antibiotic, which worked but hurt his stomach. He went and got a humidifier because he wakes up with very dry eyes. He went to an eye doctor the second time and was told his eyes are dry.    SOCIAL HISTORY  He does not smoke. He quit drinking alcohol 2 years ago.    MEDICATIONS  clonazepam       The following portions of the patient's history were reviewed and updated as appropriate: allergies, current medications, past family history, past medical history, past social history, past surgical history, and problem list.    Review of Systems    Objective   Body mass index is 21.07  kg/m².  BMI is within normal parameters. No other follow-up for BMI required.       Vital Signs:   /80 (BP Location: Right arm, Patient Position: Sitting, Cuff Size: Adult)   Pulse 90   Temp 97 °F (36.1 °C) (Temporal)   Resp 18   Wt 70.5 kg (155 lb 6 oz)   SpO2 99%   BMI 21.07 kg/m²       Physical Exam  The patient appears somewhat anxious but not in distress.  Head is normocephalic and atraumatic. Pupils are equal and responsive to light and accommodation. Extraocular muscles are intact. Membranes in the mouth are moist.  Neck is supple. No cervical lymphadenopathy or goiter detected.  Chest is clear to auscultation, no rhonchi or wheezes.  Heart sounds S1 and S2 are present. No murmurs, rubs, or gallops detected.  Peripheral vascular exam shows +2 pulses, warm extremities, and good perfusion.       Results              Assessment and Plan  Diagnoses and all orders for this visit:    Spent 35 minutes face-to-face with patient discussing mental health management  Assessment & Plan  1. Psychosomatic breathing issues.  Upon reviewing his medical history and conducting a physical examination, it appears that his symptoms are indicative of a psychosomatic origin concerning his respiratory distress. A review of his previous emergency room records reveals a normal chest x-ray conducted earlier this month. Additionally, an EKG and other laboratory tests performed during an urgent treatment center visit were also within normal parameters. His symptoms appear to be closely linked to his emotional state, mood, and anxiety levels. Notably, his respiratory distress seems to improve following the administration of his medication, suggesting a possible psychosomatic or anxiety-related cause. He is advised to continue with his current medication regimen. A referral to behavioral health for medication management is also recommended, as it is believed that finding the right combination of medications could significantly  alleviate his overall anxiety.    2. Anxiety.  He reports that clonazepam helps with his anxiety but takes about an hour to an hour and a half to become effective, especially if he has eaten. He is currently taking clonazepam 3 times a day. He has not had a good experience with SSRIs in the past. He is advised to continue with his current medication regimen. A referral to behavioral health for medication management is recommended to find a more effective combination of medications to manage his anxiety.    Follow-up  The patient will follow up as needed.               Follow Up   No follow-ups on file.  Patient was given instructions and counseling regarding his condition or for health maintenance advice. Please see specific information pulled into the AVS if appropriate.     Patient or patient representative verbalized consent for the use of Ambient Listening during the visit with  Jorje Hurtado MD for chart documentation. 2/20/2025  13:49 EST

## 2025-02-21 DIAGNOSIS — R11.2 NAUSEA AND VOMITING, UNSPECIFIED VOMITING TYPE: ICD-10-CM

## 2025-02-22 DIAGNOSIS — R11.0 NAUSEA: Primary | ICD-10-CM

## 2025-02-22 RX ORDER — ONDANSETRON 8 MG/1
8 TABLET, FILM COATED ORAL EVERY 8 HOURS PRN
Qty: 25 TABLET | Refills: 1 | Status: SHIPPED | OUTPATIENT
Start: 2025-02-22

## 2025-02-24 RX ORDER — ONDANSETRON 8 MG/1
8 TABLET, ORALLY DISINTEGRATING ORAL EVERY 8 HOURS PRN
Qty: 30 TABLET | Refills: 1 | Status: SHIPPED | OUTPATIENT
Start: 2025-02-24

## 2025-03-01 DIAGNOSIS — N52.9 ERECTILE DYSFUNCTION, UNSPECIFIED ERECTILE DYSFUNCTION TYPE: ICD-10-CM

## 2025-03-03 RX ORDER — TADALAFIL 20 MG/1
20 TABLET ORAL DAILY PRN
Qty: 6 TABLET | Refills: 5 | Status: SHIPPED | OUTPATIENT
Start: 2025-03-03

## 2025-03-18 DIAGNOSIS — F41.9 ANXIETY: Primary | ICD-10-CM

## 2025-03-18 DIAGNOSIS — F41.9 ANXIETY: ICD-10-CM

## 2025-03-18 DIAGNOSIS — F41.0 PANIC ATTACKS: ICD-10-CM

## 2025-03-19 RX ORDER — CLONAZEPAM 2 MG/1
2 TABLET ORAL 3 TIMES DAILY PRN
Qty: 90 TABLET | Refills: 3 | Status: SHIPPED | OUTPATIENT
Start: 2025-03-19

## 2025-03-24 ENCOUNTER — TELEPHONE (OUTPATIENT)
Dept: INTERNAL MEDICINE | Facility: CLINIC | Age: 35
End: 2025-03-24
Payer: COMMERCIAL

## 2025-03-24 NOTE — TELEPHONE ENCOUNTER
Called pt regarding overdue labs, he said that he was not aware of any orders but that he would come in asap to have them drawn.

## 2025-04-22 DIAGNOSIS — N52.9 ERECTILE DYSFUNCTION, UNSPECIFIED ERECTILE DYSFUNCTION TYPE: Primary | ICD-10-CM

## 2025-04-22 RX ORDER — SILDENAFIL 100 MG/1
100 TABLET, FILM COATED ORAL DAILY PRN
Qty: 15 TABLET | Refills: 5 | Status: SHIPPED | OUTPATIENT
Start: 2025-04-22

## 2025-05-12 ENCOUNTER — TELEPHONE (OUTPATIENT)
Dept: INTERNAL MEDICINE | Facility: CLINIC | Age: 35
End: 2025-05-12
Payer: COMMERCIAL

## 2025-05-12 NOTE — TELEPHONE ENCOUNTER
Called patient and LMOM regarding overdue lab orders.       RELAY--He needs the following labs, see if he will come in M-F, 8-4 andf have these drawn: CBC, Folate, Sed Rate, HIV, Vitamin B12

## 2025-05-13 NOTE — TELEPHONE ENCOUNTER
Attempt to call patient, no answer    Left vm for patient to return call      PLEASE RELAY:     He needs the following labs, see if he will come in M-F, 8-4 andf have these drawn: CBC, Folate, Sed Rate, HIV, Vitamin B12

## 2025-05-29 ENCOUNTER — TELEPHONE (OUTPATIENT)
Dept: INTERNAL MEDICINE | Facility: CLINIC | Age: 35
End: 2025-05-29
Payer: COMMERCIAL

## 2025-05-29 NOTE — TELEPHONE ENCOUNTER
Called patient and LMOM regarding overdue labs from Dr. Hurtado, 12/05/2024. LMOM that he can come by and have them drawn M-F, 8-4 or to call us and let us know if he does not want to have these down. Also sent patient a second letter.    RELAY--Patient has the following labs overdue. See if he will come by for collection or if he does not want to have them drawn:  CBC, Folate, HIV, B-12, Sed Rate.

## 2025-05-30 ENCOUNTER — OFFICE VISIT (OUTPATIENT)
Dept: INTERNAL MEDICINE | Facility: CLINIC | Age: 35
End: 2025-05-30
Payer: COMMERCIAL

## 2025-05-30 VITALS
RESPIRATION RATE: 16 BRPM | HEIGHT: 71 IN | SYSTOLIC BLOOD PRESSURE: 134 MMHG | WEIGHT: 162 LBS | TEMPERATURE: 98.2 F | HEART RATE: 76 BPM | BODY MASS INDEX: 22.68 KG/M2 | DIASTOLIC BLOOD PRESSURE: 78 MMHG

## 2025-05-30 DIAGNOSIS — F41.9 ANXIETY: ICD-10-CM

## 2025-05-30 DIAGNOSIS — R68.82 DECREASED LIBIDO: ICD-10-CM

## 2025-05-30 DIAGNOSIS — F41.0 PANIC ATTACKS: ICD-10-CM

## 2025-05-30 DIAGNOSIS — E53.8 VITAMIN B 12 DEFICIENCY: ICD-10-CM

## 2025-05-30 DIAGNOSIS — Z00.00 WELL ADULT EXAM: Primary | ICD-10-CM

## 2025-05-30 DIAGNOSIS — E55.9 VITAMIN D DEFICIENCY: ICD-10-CM

## 2025-05-30 DIAGNOSIS — Z13.220 LIPID SCREENING: ICD-10-CM

## 2025-05-30 NOTE — PROGRESS NOTES
Chief Complaint  Annual Exam    Subjective    Tr Brito is a 35 y.o. male.     Tr Brito presents to [facility] for     History of Present Illness  The patient presents for a complete adult physical exam.    He has expressed a desire to have his testosterone levels evaluated due to persistent fatigue, which he finds perplexing given his adequate dietary intake and recent weight gain of approximately 30 pounds. He reports a decrease in libido, a change that has been distressing for both him and his wife. He has abstained from alcohol consumption and is seeking to understand the cause of these symptoms. Additionally, he notes a significant change in his sexual function, specifically the absence of morning erections, a condition that has persisted for the past 2 years. Even with pharmacological intervention, he struggles to maintain an erection.    Despite achieving restful sleep, he continues to experience feelings of tiredness.    SOCIAL HISTORY  The patient quit drinking alcohol.         Complete Adult Physical          Diet: regular         Exercise: Minimal to none, + fatigue           Social History     no EtOH consumption  No cigarette smoking  No marijuana      Preventative Screenings  Up-to-date        Immunizations:         The following portions of the patient's history were reviewed and updated as appropriate: allergies, current medications, past family history, past medical history, past social history, past surgical history, and problem list.    Review of Systems   Constitutional:  Positive for fatigue.   All other systems reviewed and are negative.      Objective   Body mass index is 22.31 kg/m².  BMI is within normal parameters. No other follow-up for BMI required.       Vital Signs:   /78 (BP Location: Right arm, Patient Position: Sitting, Cuff Size: Adult)   Pulse 76   Temp 98.2 °F (36.8 °C) (Temporal)   Resp 16   Ht  "181.5 cm (71.46\")   Wt 73.5 kg (162 lb)   BMI 22.31 kg/m²       Physical Exam  The patient is alert x3, in no distress.  Head is normocephalic and atraumatic. Pupils are equal, light, and accommodation. Extraocular muscles are intact. Membranes are moist.  Neck is supple. No cervical lymphadenopathy or border.  Chest is clear to auscultation, no lung wheeze.  Heart sounds S1, S2. No murmurs, rubs, gallop.  No testicular mass and no inguinal hernia appreciated.  +2 pulses, lower extremity, good perfusion. Strength is 5 out of 5 both upper and lower proximal distribution and symmetric.  Cranial nerves are intact. +2 DTRs.       Results              Assessment and Plan  Diagnoses and all orders for this visit:  Assessment & Plan  1. Decreased libido.  This is a new issue to report at this time. After reviewing his history and physical, there are concerns about possible low testosterone. Total and free testosterone levels will be tested fasting in the morning between 8 and 10 AM for insurance purposes. If the initial testosterone level is low, a consecutive measurement will be required.    2. Nonspecific fatigue.  This is a chronic issue. Tests for TSH, free T4, vitamin D level, and vitamin B12 will be conducted to rule out any active issues or concerns.    3. Anxiety and panic attacks.  His condition is chronic and stable. He will continue on his current medications as there are no other active issues or concerns at this time.    4. Health maintenance.  Other labs including CBC, CMP, and lipid panel will be done. Routine dental and ophthalmology visits are recommended.       Diagnoses and all orders for this visit:    1. Well adult exam (Primary)  -     CBC (No Diff); Future  -     Comprehensive Metabolic Panel; Future  -     T4, Free; Future  -     TSH; Future    2. Decreased libido  -     Testosterone, Free, Total; Future  -     Sex Horm Binding Globulin; Future    3. Anxiety    4. Panic attacks    5. Lipid " screening  -     Lipid Panel; Future    6. Vitamin D deficiency  -     Vitamin D 25 hydroxy; Future    7. Vitamin B 12 deficiency  -     Vitamin B12; Future    Anticipatory guidance:  Recommend routine dental visit.    Recommend routine ophthalmology visit.          Follow Up   No follow-ups on file.  Patient was given instructions and counseling regarding his condition or for health maintenance advice. Please see specific information pulled into the AVS if appropriate.     Patient or patient representative verbalized consent for the use of Ambient Listening during the visit with  Jorje Hurtado MD for chart documentation. 5/30/2025  12:41 EDT

## 2025-06-09 ENCOUNTER — LAB (OUTPATIENT)
Dept: INTERNAL MEDICINE | Facility: CLINIC | Age: 35
End: 2025-06-09
Payer: COMMERCIAL

## 2025-06-09 DIAGNOSIS — Z13.220 LIPID SCREENING: ICD-10-CM

## 2025-06-09 DIAGNOSIS — E53.8 VITAMIN B 12 DEFICIENCY: ICD-10-CM

## 2025-06-09 DIAGNOSIS — R68.82 DECREASED LIBIDO: ICD-10-CM

## 2025-06-09 DIAGNOSIS — R53.83 OTHER FATIGUE: ICD-10-CM

## 2025-06-09 DIAGNOSIS — Z00.00 WELL ADULT EXAM: ICD-10-CM

## 2025-06-09 DIAGNOSIS — D64.9 ANEMIA, UNSPECIFIED TYPE: ICD-10-CM

## 2025-06-09 DIAGNOSIS — E53.8 FOLATE DEFICIENCY: ICD-10-CM

## 2025-06-09 DIAGNOSIS — E55.9 VITAMIN D DEFICIENCY: ICD-10-CM

## 2025-06-09 LAB
25(OH)D3 SERPL-MCNC: 29.4 NG/ML (ref 30–100)
ALBUMIN SERPL-MCNC: 4.5 G/DL (ref 3.5–5.2)
ALBUMIN/GLOB SERPL: 2 G/DL
ALP SERPL-CCNC: 102 U/L (ref 39–117)
ALT SERPL W P-5'-P-CCNC: 17 U/L (ref 1–41)
ANION GAP SERPL CALCULATED.3IONS-SCNC: 7.6 MMOL/L (ref 5–15)
AST SERPL-CCNC: 28 U/L (ref 1–40)
BASOPHILS # BLD AUTO: 0.02 10*3/MM3 (ref 0–0.2)
BASOPHILS NFR BLD AUTO: 0.4 % (ref 0–1.5)
BILIRUB SERPL-MCNC: 0.2 MG/DL (ref 0–1.2)
BUN SERPL-MCNC: 13 MG/DL (ref 6–20)
BUN/CREAT SERPL: 13 (ref 7–25)
CALCIUM SPEC-SCNC: 9.4 MG/DL (ref 8.6–10.5)
CHLORIDE SERPL-SCNC: 99 MMOL/L (ref 98–107)
CHOLEST SERPL-MCNC: 242 MG/DL (ref 0–200)
CO2 SERPL-SCNC: 29.4 MMOL/L (ref 22–29)
CREAT SERPL-MCNC: 1 MG/DL (ref 0.76–1.27)
DEPRECATED RDW RBC AUTO: 41.2 FL (ref 37–54)
EGFRCR SERPLBLD CKD-EPI 2021: 101.9 ML/MIN/1.73
EOSINOPHIL # BLD AUTO: 0.2 10*3/MM3 (ref 0–0.4)
EOSINOPHIL NFR BLD AUTO: 3.8 % (ref 0.3–6.2)
ERYTHROCYTE [DISTWIDTH] IN BLOOD BY AUTOMATED COUNT: 12 % (ref 12.3–15.4)
ERYTHROCYTE [SEDIMENTATION RATE] IN BLOOD: <1 MM/HR (ref 0–15)
FOLATE SERPL-MCNC: 6.67 NG/ML (ref 4.78–24.2)
GLOBULIN UR ELPH-MCNC: 2.3 GM/DL
GLUCOSE SERPL-MCNC: 99 MG/DL (ref 65–99)
HCT VFR BLD AUTO: 37.7 % (ref 37.5–51)
HDLC SERPL-MCNC: 86 MG/DL (ref 40–60)
HGB BLD-MCNC: 13 G/DL (ref 13–17.7)
HIV 1+2 AB+HIV1 P24 AG SERPL QL IA: NORMAL
IMM GRANULOCYTES # BLD AUTO: 0.01 10*3/MM3 (ref 0–0.05)
IMM GRANULOCYTES NFR BLD AUTO: 0.2 % (ref 0–0.5)
LDLC SERPL CALC-MCNC: 136 MG/DL (ref 0–100)
LDLC/HDLC SERPL: 1.54 {RATIO}
LYMPHOCYTES # BLD AUTO: 2.6 10*3/MM3 (ref 0.7–3.1)
LYMPHOCYTES NFR BLD AUTO: 48.8 % (ref 19.6–45.3)
MCH RBC QN AUTO: 32.5 PG (ref 26.6–33)
MCHC RBC AUTO-ENTMCNC: 34.5 G/DL (ref 31.5–35.7)
MCV RBC AUTO: 94.3 FL (ref 79–97)
MONOCYTES # BLD AUTO: 0.33 10*3/MM3 (ref 0.1–0.9)
MONOCYTES NFR BLD AUTO: 6.2 % (ref 5–12)
NEUTROPHILS NFR BLD AUTO: 2.17 10*3/MM3 (ref 1.7–7)
NEUTROPHILS NFR BLD AUTO: 40.6 % (ref 42.7–76)
NRBC BLD AUTO-RTO: 0 /100 WBC (ref 0–0.2)
PLATELET # BLD AUTO: 270 10*3/MM3 (ref 140–450)
PMV BLD AUTO: 9.5 FL (ref 6–12)
POTASSIUM SERPL-SCNC: 4.2 MMOL/L (ref 3.5–5.2)
PROT SERPL-MCNC: 6.8 G/DL (ref 6–8.5)
RBC # BLD AUTO: 4 10*6/MM3 (ref 4.14–5.8)
SODIUM SERPL-SCNC: 136 MMOL/L (ref 136–145)
T4 FREE SERPL-MCNC: 1.18 NG/DL (ref 0.92–1.68)
TRIGL SERPL-MCNC: 118 MG/DL (ref 0–150)
TSH SERPL DL<=0.05 MIU/L-ACNC: 2.09 UIU/ML (ref 0.27–4.2)
VIT B12 BLD-MCNC: 599 PG/ML (ref 211–946)
VLDLC SERPL-MCNC: 20 MG/DL (ref 5–40)
WBC NRBC COR # BLD AUTO: 5.33 10*3/MM3 (ref 3.4–10.8)

## 2025-06-09 PROCEDURE — 36415 COLL VENOUS BLD VENIPUNCTURE: CPT | Performed by: INTERNAL MEDICINE

## 2025-06-09 PROCEDURE — 82306 VITAMIN D 25 HYDROXY: CPT | Performed by: INTERNAL MEDICINE

## 2025-06-09 PROCEDURE — 82746 ASSAY OF FOLIC ACID SERUM: CPT | Performed by: INTERNAL MEDICINE

## 2025-06-09 PROCEDURE — 85652 RBC SED RATE AUTOMATED: CPT | Performed by: INTERNAL MEDICINE

## 2025-06-09 PROCEDURE — 84270 ASSAY OF SEX HORMONE GLOBUL: CPT | Performed by: INTERNAL MEDICINE

## 2025-06-09 PROCEDURE — 84403 ASSAY OF TOTAL TESTOSTERONE: CPT | Performed by: INTERNAL MEDICINE

## 2025-06-09 PROCEDURE — 84402 ASSAY OF FREE TESTOSTERONE: CPT | Performed by: INTERNAL MEDICINE

## 2025-06-09 PROCEDURE — 80061 LIPID PANEL: CPT | Performed by: INTERNAL MEDICINE

## 2025-06-09 PROCEDURE — 82607 VITAMIN B-12: CPT | Performed by: INTERNAL MEDICINE

## 2025-06-09 PROCEDURE — 80050 GENERAL HEALTH PANEL: CPT | Performed by: INTERNAL MEDICINE

## 2025-06-09 PROCEDURE — G0432 EIA HIV-1/HIV-2 SCREEN: HCPCS | Performed by: INTERNAL MEDICINE

## 2025-06-09 PROCEDURE — 84439 ASSAY OF FREE THYROXINE: CPT | Performed by: INTERNAL MEDICINE

## 2025-06-10 LAB — SHBG SERPL-SCNC: 86.9 NMOL/L (ref 16.5–55.9)

## 2025-06-11 DIAGNOSIS — E29.1 HYPOGONADISM IN MALE: ICD-10-CM

## 2025-06-11 DIAGNOSIS — R68.82 DECREASED LIBIDO: Primary | ICD-10-CM

## 2025-06-11 LAB
TESTOST FREE SERPL-MCNC: 3.8 PG/ML (ref 8.7–25.1)
TESTOST SERPL-MCNC: 545 NG/DL (ref 264–916)

## 2025-06-15 ENCOUNTER — RESULTS FOLLOW-UP (OUTPATIENT)
Dept: INTERNAL MEDICINE | Facility: CLINIC | Age: 35
End: 2025-06-15
Payer: COMMERCIAL

## 2025-06-15 DIAGNOSIS — E29.1 HYPOGONADISM IN MALE: Primary | ICD-10-CM

## 2025-06-15 DIAGNOSIS — R68.82 DECREASED LIBIDO: ICD-10-CM

## 2025-06-20 ENCOUNTER — LAB (OUTPATIENT)
Dept: INTERNAL MEDICINE | Facility: CLINIC | Age: 35
End: 2025-06-20
Payer: COMMERCIAL

## 2025-06-20 DIAGNOSIS — R68.82 DECREASED LIBIDO: ICD-10-CM

## 2025-06-20 DIAGNOSIS — E29.1 HYPOGONADISM IN MALE: ICD-10-CM

## 2025-06-20 LAB
FSH SERPL-ACNC: 3.3 MIU/ML
LH SERPL-ACNC: 6.78 MIU/ML

## 2025-06-20 PROCEDURE — 83002 ASSAY OF GONADOTROPIN (LH): CPT | Performed by: INTERNAL MEDICINE

## 2025-06-20 PROCEDURE — 36415 COLL VENOUS BLD VENIPUNCTURE: CPT | Performed by: INTERNAL MEDICINE

## 2025-06-20 PROCEDURE — 84403 ASSAY OF TOTAL TESTOSTERONE: CPT | Performed by: INTERNAL MEDICINE

## 2025-06-20 PROCEDURE — 84402 ASSAY OF FREE TESTOSTERONE: CPT | Performed by: INTERNAL MEDICINE

## 2025-06-20 PROCEDURE — 83001 ASSAY OF GONADOTROPIN (FSH): CPT | Performed by: INTERNAL MEDICINE

## 2025-06-24 LAB
TESTOST FREE SERPL-MCNC: 4.9 PG/ML (ref 8.7–25.1)
TESTOST SERPL-MCNC: 811 NG/DL (ref 264–916)

## 2025-06-26 ENCOUNTER — RESULTS FOLLOW-UP (OUTPATIENT)
Dept: INTERNAL MEDICINE | Facility: CLINIC | Age: 35
End: 2025-06-26
Payer: COMMERCIAL

## 2025-07-04 DIAGNOSIS — E29.1 HYPOGONADISM IN MALE: Primary | ICD-10-CM

## 2025-07-08 ENCOUNTER — TELEPHONE (OUTPATIENT)
Dept: INTERNAL MEDICINE | Facility: CLINIC | Age: 35
End: 2025-07-08
Payer: COMMERCIAL

## 2025-07-08 NOTE — TELEPHONE ENCOUNTER
Caller: Tr Brito    Relationship to patient: Self    Best call back number: [phone]     Patient is needing: PRIOR AUTHORIZATION FOR   Testosterone Enanthate 50 MG/0.5ML solution auto-injector  PLEASE ADVISE.       [id number], [place] - [address] AT [facility] [address] - [phone] PH - [phone] FX

## 2025-07-09 NOTE — TELEPHONE ENCOUNTER
PATIENT STATES THAT THE INSURANCE COMPANY DENIED THE PA. HE WOULD LIKE TO MOVE FORWARD TO THE APPEAL. HE DOESN'T WANT TO USE THE GELL BECAUSE IT CAN RUB OFF ON TO OTHER. HE LIVES WITH HIS WIFE AND DAUGHTERS AND DOESN'T WANT TO EXPOSE THEM. 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

## 2025-07-11 NOTE — TELEPHONE ENCOUNTER
Spoke with patient, in order to submit an appeal, we would need a letter stating that the medication of this form is beneficial and subtle for the patient versus the gel or cream form.

## 2025-07-18 NOTE — TELEPHONE ENCOUNTER
Attempt to call patient, no answer    Left vm for patient to return call      PLEASE RELAY:     Submitting appeal for medication, it can take up to 14-30 days from the date the appeal was submitted to be processed. I will keep you updated on the process

## 2025-07-29 ENCOUNTER — OFFICE VISIT (OUTPATIENT)
Dept: INTERNAL MEDICINE | Facility: CLINIC | Age: 35
End: 2025-07-29
Payer: COMMERCIAL

## 2025-07-29 VITALS
RESPIRATION RATE: 16 BRPM | DIASTOLIC BLOOD PRESSURE: 84 MMHG | HEART RATE: 84 BPM | SYSTOLIC BLOOD PRESSURE: 134 MMHG | TEMPERATURE: 98.9 F | BODY MASS INDEX: 22.24 KG/M2 | WEIGHT: 161.5 LBS

## 2025-07-29 DIAGNOSIS — F41.9 ANXIETY: ICD-10-CM

## 2025-07-29 DIAGNOSIS — E29.1 HYPOGONADISM IN MALE: Primary | ICD-10-CM

## 2025-07-29 PROCEDURE — 99214 OFFICE O/P EST MOD 30 MIN: CPT | Performed by: INTERNAL MEDICINE

## 2025-07-29 RX ORDER — NEEDLES, SAFETY 23GX1 1/2"
NEEDLE, DISPOSABLE MISCELLANEOUS
Qty: 100 EACH | Refills: 4 | Status: SHIPPED | OUTPATIENT
Start: 2025-07-29

## 2025-07-29 RX ORDER — TESTOSTERONE ENANTHATE 200 MG/ML
200 INJECTION, SOLUTION INTRAMUSCULAR
Qty: 2 ML | Refills: 2 | Status: SHIPPED | OUTPATIENT
Start: 2025-07-29

## 2025-07-29 RX ORDER — CLONAZEPAM 2 MG/1
2 TABLET ORAL 3 TIMES DAILY PRN
Qty: 90 TABLET | Refills: 4 | Status: SHIPPED | OUTPATIENT
Start: 2025-07-29

## 2025-07-29 NOTE — TELEPHONE ENCOUNTER
The appeal was denied by [facility].    Patient has been made aware at appointment with Dr. Hurtado on 07/29/2025

## 2025-07-29 NOTE — PROGRESS NOTES
Chief Complaint  Results (Labs 06/20/2025)    Subjective    Tr Brito is a 35 y.o. male.     Tr Brito presents to [facility] for     History of Present Illness  The patient presents for hypogonadism and anxiety.    He reports that his insurance has declined coverage for Xyosted, a medication he found beneficial without any adverse effects. His last injection was administered a week ago, and he has one dose remaining. He expresses a preference for injectable testosterone, even though it causes discomfort. He has observed an increase in his red blood cell count since starting the treatment. His weight has stabilized around 160 pounds, and he maintains a healthy diet. He reports feeling better overall and has experienced morning erections once since starting the treatment. He is not experiencing any side effects from the medication. He also mentions that he has lost interest in sexual activity.    He is seeking a refill of his clonazepam prescription, which he last collected on 07/07/2025.    MEDICATIONS  Current: Clonazepam, Xyosted       The following portions of the patient's history were reviewed and updated as appropriate: allergies, current medications, past family history, past medical history, past social history, past surgical history, and problem list.    Review of Systems   All other systems reviewed and are negative.      Objective   Body mass index is 22.24 kg/m².  BMI is within normal parameters. No other follow-up for BMI required.       Vital Signs:   /84 (BP Location: Right arm, Patient Position: Sitting, Cuff Size: Adult)   Pulse 84   Temp 98.9 °F (37.2 °C) (Temporal)   Resp 16   Wt 73.3 kg (161 lb 8 oz)   BMI 22.24 kg/m²       Physical Exam  Patient is alert x3, no distress.  Head is normocephalic, atraumatic. Pupils equal, light, accommodation. Extraocular muscles intact. Moist membranes.  Chest is clear to  "auscultation, no rhonchi, wheeze.  S1, S2. No murmurs, rubs, or gallop.       Results  Laboratory Studies  Testosterone levels are low. Red blood cell count has increased.            Assessment and Plan  Diagnoses and all orders for this visit:  Assessment & Plan  1. Hypogonadism.  Chronic and stable. Initially prescribed Xyosted, but insurance will not cover this route and brand of medication. The treatment plan will be adjusted to Depo-Testosterone, starting with 200 mg intramuscularly every 14 days. Side effects and precautions have been discussed. The nurse has provided instructions on the injection method. Total and free testosterone levels will be checked in approximately 3 to 4 weeks before the third injection or sooner if any concerns arise regarding side effects.    2. Anxiety.  Chronic and stable. A refill for clonazepam has been requested and will be continued at the current dosage.    Follow-up  The patient will follow up in 3 to 4 weeks.         Diagnoses and all orders for this visit:    1. Hypogonadism in male (Primary)  -     Testosterone Enanthate 200 MG/ML solution; Inject 200 mg into the appropriate muscle as directed by prescriber Every 14 (Fourteen) Days.  Dispense: 2 mL; Refill: 2  -     Testosterone, Free, Total; Future  -     Needle, Disp, (BD SafetyGlide Needle) 23G X 1-1/2\" misc; Inject testosterone every 14 days  Dispense: 100 each; Refill: 4    2. Anxiety  -     clonazePAM (KlonoPIN) 2 MG tablet; Take 1 tablet by mouth 3 (Three) Times a Day As Needed for Anxiety. for anxiety  Dispense: 90 tablet; Refill: 4            Follow Up   No follow-ups on file.  Patient was given instructions and counseling regarding his condition or for health maintenance advice. Please see specific information pulled into the AVS if appropriate.     Patient or patient representative verbalized consent for the use of Ambient Listening during the visit with  Jorje Hurtado MD for chart documentation. 7/29/2025  11:48 " EDT

## 2025-08-08 ENCOUNTER — TELEPHONE (OUTPATIENT)
Dept: INTERNAL MEDICINE | Facility: CLINIC | Age: 35
End: 2025-08-08
Payer: COMMERCIAL

## (undated) DEVICE — TUBING, SUCTION, 1/4" X 10', STRAIGHT: Brand: MEDLINE

## (undated) DEVICE — SYR LUERLOK 50ML

## (undated) DEVICE — INTRO ACCSR BLNT TP

## (undated) DEVICE — SPNG VERSALON 4X4 4PLY NONSTRL LF BG/200

## (undated) DEVICE — SOL IRR H2O BTL 1000ML STRL

## (undated) DEVICE — THE BITE BLOCK MAXI, LATEX FREE STRAP IS USED TO PROTECT THE ENDOSCOPE INSERTION TUBE FROM BEING BITTEN BY THE PATIENT.

## (undated) DEVICE — FRCP BX RADJAW4 NDL 2.8 240CM LG OG BX40

## (undated) DEVICE — KT ORCA ORCAPOD DISP STRL

## (undated) DEVICE — HYBRID CO2 TUBING/CAP SET FOR OLYMPUS® SCOPES & CO2 SOURCE: Brand: ERBE

## (undated) DEVICE — ADAPT CLN LUM OLYMP AIR/H20

## (undated) DEVICE — LUBE GEL ENDOGLIDE 1.1OZ

## (undated) DEVICE — FIRST STEP BEDSIDE ADD WATER KIT - RESEALABLE STAND-UP POUCH, ENDOSCOPIC CLEANING PAD - 1 POUCH: Brand: FIRST STEP BEDSIDE ADD WATER KIT - RESEALABLE STAND-UP POUCH, ENDOSCOPIC CLEANIN

## (undated) DEVICE — CONTN GRAD MEAS TRIANG 32OZ BLK

## (undated) DEVICE — SAFELINER SUCTION CANISTER 1000CC: Brand: DEROYAL

## (undated) DEVICE — SOLIDIFIER LIQ PREMISORB 1500CC